# Patient Record
Sex: FEMALE | Race: WHITE | NOT HISPANIC OR LATINO | Employment: FULL TIME | URBAN - METROPOLITAN AREA
[De-identification: names, ages, dates, MRNs, and addresses within clinical notes are randomized per-mention and may not be internally consistent; named-entity substitution may affect disease eponyms.]

---

## 2020-01-28 RX ORDER — AZITHROMYCIN 250 MG/1
TABLET, FILM COATED ORAL
COMMUNITY
Start: 2017-10-04 | End: 2020-05-20 | Stop reason: HOSPADM

## 2020-01-28 RX ORDER — BUDESONIDE AND FORMOTEROL FUMARATE DIHYDRATE 80; 4.5 UG/1; UG/1
2 AEROSOL RESPIRATORY (INHALATION) 2 TIMES DAILY
COMMUNITY
Start: 2016-11-16 | End: 2020-09-09

## 2020-01-28 RX ORDER — TRAZODONE HYDROCHLORIDE 150 MG/1
TABLET ORAL
COMMUNITY
Start: 2017-08-03 | End: 2020-09-09

## 2020-01-28 RX ORDER — CETIRIZINE HYDROCHLORIDE 10 MG/1
10 TABLET ORAL 2 TIMES DAILY
COMMUNITY
End: 2021-05-27

## 2020-01-28 RX ORDER — SULFAMETHOXAZOLE AND TRIMETHOPRIM 800; 160 MG/1; MG/1
TABLET ORAL 2 TIMES DAILY
COMMUNITY
Start: 2020-01-27 | End: 2020-02-06

## 2020-01-28 RX ORDER — OMEPRAZOLE 20 MG/1
20 CAPSULE, DELAYED RELEASE ORAL EVERY MORNING
COMMUNITY

## 2020-01-28 RX ORDER — ALBUTEROL SULFATE 90 UG/1
1 AEROSOL, METERED RESPIRATORY (INHALATION) EVERY 6 HOURS PRN
COMMUNITY
Start: 2019-10-04 | End: 2020-10-03

## 2020-01-29 ENCOUNTER — OFFICE VISIT (OUTPATIENT)
Dept: SURGERY | Facility: CLINIC | Age: 55
End: 2020-01-29
Payer: COMMERCIAL

## 2020-01-29 VITALS
BODY MASS INDEX: 42.38 KG/M2 | HEART RATE: 70 BPM | RESPIRATION RATE: 18 BRPM | HEIGHT: 67 IN | WEIGHT: 270 LBS | SYSTOLIC BLOOD PRESSURE: 132 MMHG | DIASTOLIC BLOOD PRESSURE: 78 MMHG

## 2020-01-29 DIAGNOSIS — S31.109A OPEN WOUND OF ABDOMINAL WALL, INITIAL ENCOUNTER: Primary | ICD-10-CM

## 2020-01-29 PROCEDURE — 99204 OFFICE O/P NEW MOD 45 MIN: CPT | Performed by: SPECIALIST

## 2020-01-29 RX ORDER — BUDESONIDE AND FORMOTEROL FUMARATE DIHYDRATE 80; 4.5 UG/1; UG/1
AEROSOL RESPIRATORY (INHALATION)
COMMUNITY
End: 2020-02-18

## 2020-01-29 NOTE — H&P
Chief Complaint:  Multiple suture granulomas      History of Present Illness:   Patient is a 51-year-old white female patient of ours for many years  Most recently we performed a laparoscopic cholecystectomy with cholangiogram January 2016  Prior to that she underwent an open gastric bypass at [facility] in [place] in 2000  At that time she weighed close to 600 lb  She currently weighs about 270  She also went underwent a revision in 2006  A subsequent tummy tuck in 2007 with hernia repair  Recurrent hernia repaired with mesh  Laparoscopic assisted repair of the hernia lysis of adhesions 2008  In 2015 a removal of suture granulomas protruding through her incision  Subsequent crampy abdominal pain precipitating laparoscopic cholecystectomy cholangiogram 01/14/2016  She presents to the office today with a 6 week history of swelling and drainage from her abdominal wall  She has 4 areas in question  Three are subxiphoid  Two of these are protrusions that are erythematous and quite tender to palpation  One is an indentation  Another 1 in the left lower quadrant as an indentation  She was seen by her family physician placed on Bactrim  She was referred office for evaluation of this  Other than that she is relatively healthy  She has a history of asthma but this is been under control  Past Medical History:   Past Medical History:   Diagnosis Date    Anxiety     Arthritis     Asthma     Clotting disorder ([facility] [address] )     GERD (gastroesophageal reflux disease)     Hypertension          Past Surgical History:    Past Surgical History:   Procedure Laterality Date    CHOLECYSTECTOMY      COLON SURGERY      HERNIA REPAIR      SLEEVE GASTROPLASTY      [employer]    SMALL INTESTINE SURGERY           Allergies:     Allergies   Allergen Reactions    Morphine Itching    Penicillins Other (See Comments)     "PARALYZES HER"  Was paralized  Was paralized           Medications:    Current Outpatient Medications:     albuterol (PROVENTIL HFA,VENTOLIN HFA) 90 mcg/act inhaler, Inhale 1 puff every 6 (six) hours as needed, Disp: , Rfl:     budesonide-formoterol (SYMBICORT) 80-4 5 MCG/ACT inhaler, Inhale 2 puffs 2 (two) times a day, Disp: , Rfl:     cetirizine (ZyrTEC) 10 mg tablet, Take 10 mg by mouth 2 (two) times a day, Disp: , Rfl:     Omeprazole (PRILOSEC PO), omeprazole, Disp: , Rfl:     sulfamethoxazole-trimethoprim (BACTRIM DS) 800-160 mg per tablet, Take by mouth 2 (two) times a day, Disp: , Rfl:     azithromycin (ZITHROMAX) 250 mg tablet, 2 tabs po day 1 than 1 tab po days 2-5, Disp: , Rfl:     budesonide-formoterol (SYMBICORT) 80-4 5 MCG/ACT inhaler, Symbicort 80 mcg-4 5 mcg/actuation HFA aerosol inhaler  INHALE 2 PUFFS 2 (TWO) TIMES A DAY , Disp: , Rfl:     omeprazole (PRILOSEC) 20 mg delayed release capsule, Take 20 mg by mouth daily, Disp: , Rfl:     traZODone (DESYREL) 150 mg tablet, TAKE 2 TABLETS AT BEDTIME, Disp: , Rfl:       Social History:  Social History     Social History     Substance and Sexual Activity   Alcohol Use Yes    Binge frequency: Monthly     Social History     Substance and Sexual Activity   Drug Use Never     Social History     Tobacco Use   Smoking Status Light Tobacco Smoker   Smokeless Tobacco Never Used         Family History:    Family History   Problem Relation Age of Onset    Heart block Mother     Diabetes Mother     No Known Problems Father          Review of Systems:    As per the HPI  Pain and drainage at the sites in question on the abdominal wall  A feeling of fullness around the mid abdomen  No GI complaints    No weight loss weight gain fever chills night sweats chest pain nausea vomiting diarrhea constipation shortness of breath sore throat headaches double vision blurry vision dysuria hematuria diarrhea or melena    Vitals:  Vitals:    01/29/20 1050   BP: 132/78   Pulse: 70   Resp: 18       Physical Exam:  The patient is a middle-aged obese white female who is awake alert no distress  Vital signs as above  Skin she has diffuse generalized psoriatic rash with chronic flaky skin  Otherwise warm and dry  Head normocephalic and atraumatic  Eyes IVAN a m  intact  Ears nose within normal limits  Throat gag reflex intact  Neck no masses thyromegaly lymphadenopathy palpable  Back no CVA or spinal tenderness  Lungs clear to a and P no rales rhonchi or wheezes  Cor regular rate and rhythm no murmurs carotid bruits  Abdomen obese soft scars consistent with her previous surgeries  Two areas in the subxiphoid are protruding out 1 is erythematous and quite tender to palpation  The other is tender but not erythematous  A 3rd area is indented with a small opening  In the left lower quadrant of the abdomen is a similar indented area  She has some mild tenderness  Psoriatic rashes present  No obvious erythema  Extremities negative CC E  Neurologically A&O x3 cranial 2-12 intact  Lymphatics no lymphadenopathy palpable  Lab Results: I have personally reviewed pertinent reports  See below  Imaging: I have personally reviewed pertinent reports  EKG, Pathology, and Other Studies: I have personally reviewed pertinent reports  No visits with results within 1 Day(s) from this visit  Latest known visit with results is:   No results found for any previous visit  Impression:  Tender/draining areas abdominal wall  Suture granulomas  ?  Infected    Plan:  Wound exploration under local with IV sedation at the hospital   Vancomycin

## 2020-01-29 NOTE — H&P (VIEW-ONLY)
Chief Complaint:  Multiple suture granulomas      History of Present Illness:   Patient is a 51-year-old white female patient of ours for many years  Most recently we performed a laparoscopic cholecystectomy with cholangiogram January 2016  Prior to that she underwent an open gastric bypass at [facility] in [address] [place] [address] in 2000  At that time she weighed close to 600 lb  She currently weighs about 270  She also went underwent a revision in 2006  A subsequent tummy tuck in 2007 with hernia repair  Recurrent hernia repaired with mesh  Laparoscopic assisted repair of the hernia lysis of adhesions 2008  In 2015 a removal of suture granulomas protruding through her incision  Subsequent crampy abdominal pain precipitating laparoscopic cholecystectomy cholangiogram 01/14/2016  She presents to the office today with a 6 week history of swelling and drainage from her abdominal wall  She has 4 areas in question  Three are subxiphoid  Two of these are protrusions that are erythematous and quite tender to palpation  One is an indentation  Another 1 in the left lower quadrant as an indentation  She was seen by her family physician placed on Bactrim  She was referred office for evaluation of this  Other than that she is relatively healthy  She has a history of asthma but this is been under control  Past Medical History:   Past Medical History:   Diagnosis Date    Anxiety     Arthritis     Asthma     Clotting disorder ([address] )     GERD (gastroesophageal reflux disease)     Hypertension          Past Surgical History:    Past Surgical History:   Procedure Laterality Date    CHOLECYSTECTOMY      COLON SURGERY      HERNIA REPAIR      SLEEVE GASTROPLASTY      [employer]    SMALL INTESTINE SURGERY           Allergies:     Allergies   Allergen Reactions    Morphine Itching    Penicillins Other (See Comments)     "PARALYZES HER"  Was paralized  Was paralized           Medications:    Current Outpatient Medications:     albuterol (PROVENTIL HFA,VENTOLIN HFA) 90 mcg/act inhaler, Inhale 1 puff every 6 (six) hours as needed, Disp: , Rfl:     budesonide-formoterol (SYMBICORT) 80-4 5 MCG/ACT inhaler, Inhale 2 puffs 2 (two) times a day, Disp: , Rfl:     cetirizine (ZyrTEC) 10 mg tablet, Take 10 mg by mouth 2 (two) times a day, Disp: , Rfl:     Omeprazole (PRILOSEC PO), omeprazole, Disp: , Rfl:     sulfamethoxazole-trimethoprim (BACTRIM DS) 800-160 mg per tablet, Take by mouth 2 (two) times a day, Disp: , Rfl:     azithromycin (ZITHROMAX) 250 mg tablet, 2 tabs po day 1 than 1 tab po days 2-5, Disp: , Rfl:     budesonide-formoterol (SYMBICORT) 80-4 5 MCG/ACT inhaler, Symbicort 80 mcg-4 5 mcg/actuation HFA aerosol inhaler  INHALE 2 PUFFS 2 (TWO) TIMES A DAY , Disp: , Rfl:     omeprazole (PRILOSEC) 20 mg delayed release capsule, Take 20 mg by mouth daily, Disp: , Rfl:     traZODone (DESYREL) 150 mg tablet, TAKE 2 TABLETS AT BEDTIME, Disp: , Rfl:       Social History:  Social History     Social History     Substance and Sexual Activity   Alcohol Use Yes    Binge frequency: Monthly     Social History     Substance and Sexual Activity   Drug Use Never     Social History     Tobacco Use   Smoking Status Light Tobacco Smoker   Smokeless Tobacco Never Used         Family History:    Family History   Problem Relation Age of Onset    Heart block Mother     Diabetes Mother     No Known Problems Father          Review of Systems:    As per the HPI  Pain and drainage at the sites in question on the abdominal wall  A feeling of fullness around the mid abdomen  No GI complaints    No weight loss weight gain fever chills night sweats chest pain nausea vomiting diarrhea constipation shortness of breath sore throat headaches double vision blurry vision dysuria hematuria diarrhea or melena    Vitals:  Vitals:    01/29/20 1050   BP: 132/78   Pulse: 70   Resp: 18       Physical Exam:  The patient is a middle-aged obese white female who is awake alert no distress  Vital signs as above  Skin she has diffuse generalized psoriatic rash with chronic flaky skin  Otherwise warm and dry  Head normocephalic and atraumatic  Eyes IVAN a m  intact  Ears nose within normal limits  Throat gag reflex intact  Neck no masses thyromegaly lymphadenopathy palpable  Back no CVA or spinal tenderness  Lungs clear to a and P no rales rhonchi or wheezes  Cor regular rate and rhythm no murmurs carotid bruits  Abdomen obese soft scars consistent with her previous surgeries  Two areas in the subxiphoid are protruding out 1 is erythematous and quite tender to palpation  The other is tender but not erythematous  A 3rd area is indented with a small opening  In the left lower quadrant of the abdomen is a similar indented area  She has some mild tenderness  Psoriatic rashes present  No obvious erythema  Extremities negative CC E  Neurologically A&O x3 cranial 2-12 intact  Lymphatics no lymphadenopathy palpable  Lab Results: I have personally reviewed pertinent reports  See below  Imaging: I have personally reviewed pertinent reports  EKG, Pathology, and Other Studies: I have personally reviewed pertinent reports  No visits with results within 1 Day(s) from this visit  Latest known visit with results is:   No results found for any previous visit  Impression:  Tender/draining areas abdominal wall  Suture granulomas  ?  Infected    Plan:  Wound exploration under local with IV sedation at the hospital   Vancomycin

## 2020-01-30 ENCOUNTER — APPOINTMENT (OUTPATIENT)
Dept: LAB | Facility: CLINIC | Age: 55
End: 2020-01-30
Payer: COMMERCIAL

## 2020-01-30 ENCOUNTER — CLINICAL SUPPORT (OUTPATIENT)
Dept: URGENT CARE | Facility: CLINIC | Age: 55
End: 2020-01-30
Payer: COMMERCIAL

## 2020-01-30 ENCOUNTER — ANESTHESIA EVENT (OUTPATIENT)
Dept: PERIOP | Facility: HOSPITAL | Age: 55
End: 2020-01-30
Payer: COMMERCIAL

## 2020-01-30 DIAGNOSIS — S31.109A OPEN WOUND OF ABDOMINAL WALL, INITIAL ENCOUNTER: ICD-10-CM

## 2020-01-30 LAB
ALBUMIN SERPL BCP-MCNC: 3.4 G/DL (ref 3.5–5)
ALP SERPL-CCNC: 127 U/L (ref 46–116)
ALT SERPL W P-5'-P-CCNC: 30 U/L (ref 12–78)
ANION GAP SERPL CALCULATED.3IONS-SCNC: 5 MMOL/L (ref 4–13)
AST SERPL W P-5'-P-CCNC: 33 U/L (ref 5–45)
ATRIAL RATE: 59 BPM
BASOPHILS # BLD AUTO: 0.06 THOUSANDS/ΜL (ref 0–0.1)
BASOPHILS NFR BLD AUTO: 1 % (ref 0–1)
BILIRUB SERPL-MCNC: 0.46 MG/DL (ref 0.2–1)
BILIRUB UR QL STRIP: NEGATIVE
BUN SERPL-MCNC: 12 MG/DL (ref 5–25)
CALCIUM SERPL-MCNC: 8.8 MG/DL (ref 8.3–10.1)
CHLORIDE SERPL-SCNC: 107 MMOL/L (ref 100–108)
CLARITY UR: CLEAR
CO2 SERPL-SCNC: 27 MMOL/L (ref 21–32)
COLOR UR: YELLOW
CREAT SERPL-MCNC: 0.55 MG/DL (ref 0.6–1.3)
EOSINOPHIL # BLD AUTO: 0.25 THOUSAND/ΜL (ref 0–0.61)
EOSINOPHIL NFR BLD AUTO: 4 % (ref 0–6)
ERYTHROCYTE [DISTWIDTH] IN BLOOD BY AUTOMATED COUNT: 19.3 % (ref 11.6–15.1)
GFR SERPL CREATININE-BSD FRML MDRD: 107 ML/MIN/1.73SQ M
GLUCOSE SERPL-MCNC: 92 MG/DL (ref 65–140)
GLUCOSE UR STRIP-MCNC: NEGATIVE MG/DL
HCT VFR BLD AUTO: 35.5 % (ref 34.8–46.1)
HGB BLD-MCNC: 10.1 G/DL (ref 11.5–15.4)
HGB UR QL STRIP.AUTO: NEGATIVE
IMM GRANULOCYTES # BLD AUTO: 0.01 THOUSAND/UL (ref 0–0.2)
IMM GRANULOCYTES NFR BLD AUTO: 0 % (ref 0–2)
KETONES UR STRIP-MCNC: NEGATIVE MG/DL
LEUKOCYTE ESTERASE UR QL STRIP: NEGATIVE
LYMPHOCYTES # BLD AUTO: 1.91 THOUSANDS/ΜL (ref 0.6–4.47)
LYMPHOCYTES NFR BLD AUTO: 28 % (ref 14–44)
MCH RBC QN AUTO: 20.3 PG (ref 26.8–34.3)
MCHC RBC AUTO-ENTMCNC: 28.5 G/DL (ref 31.4–37.4)
MCV RBC AUTO: 71 FL (ref 82–98)
MONOCYTES # BLD AUTO: 0.55 THOUSAND/ΜL (ref 0.17–1.22)
MONOCYTES NFR BLD AUTO: 8 % (ref 4–12)
NEUTROPHILS # BLD AUTO: 4.02 THOUSANDS/ΜL (ref 1.85–7.62)
NEUTS SEG NFR BLD AUTO: 59 % (ref 43–75)
NITRITE UR QL STRIP: NEGATIVE
NRBC BLD AUTO-RTO: 0 /100 WBCS
PH UR STRIP.AUTO: 6.5 [PH]
PLATELET # BLD AUTO: 327 THOUSANDS/UL (ref 149–390)
PMV BLD AUTO: 10.9 FL (ref 8.9–12.7)
POTASSIUM SERPL-SCNC: 4.2 MMOL/L (ref 3.5–5.3)
PROT SERPL-MCNC: 7.8 G/DL (ref 6.4–8.2)
PROT UR STRIP-MCNC: NEGATIVE MG/DL
QRS AXIS: 191 DEGREES
QRSD INTERVAL: 84 MS
QT INTERVAL: 438 MS
QTC INTERVAL: 433 MS
RBC # BLD AUTO: 4.98 MILLION/UL (ref 3.81–5.12)
SODIUM SERPL-SCNC: 139 MMOL/L (ref 136–145)
SP GR UR STRIP.AUTO: 1.01 (ref 1–1.03)
T WAVE AXIS: 114 DEGREES
UROBILINOGEN UR QL STRIP.AUTO: 1 E.U./DL
VENTRICULAR RATE: 59 BPM
WBC # BLD AUTO: 6.8 THOUSAND/UL (ref 4.31–10.16)

## 2020-01-30 PROCEDURE — 93010 ELECTROCARDIOGRAM REPORT: CPT | Performed by: INTERNAL MEDICINE

## 2020-01-30 PROCEDURE — 85025 COMPLETE CBC W/AUTO DIFF WBC: CPT

## 2020-01-30 PROCEDURE — 81003 URINALYSIS AUTO W/O SCOPE: CPT | Performed by: SPECIALIST

## 2020-01-30 PROCEDURE — 80053 COMPREHEN METABOLIC PANEL: CPT

## 2020-01-30 PROCEDURE — 93005 ELECTROCARDIOGRAM TRACING: CPT

## 2020-01-30 PROCEDURE — 36415 COLL VENOUS BLD VENIPUNCTURE: CPT

## 2020-01-30 RX ORDER — PREDNISONE 1 MG/1
TABLET ORAL DAILY
COMMUNITY
End: 2020-05-20 | Stop reason: HOSPADM

## 2020-01-30 NOTE — PRE-PROCEDURE INSTRUCTIONS
Pre-Surgery Instructions:   Medication Instructions    albuterol (PROVENTIL HFA,VENTOLIN HFA) 90 mcg/act inhaler Instructed patient per Anesthesia Guidelines   budesonide-formoterol (SYMBICORT) 80-4 5 MCG/ACT inhaler Instructed patient per Anesthesia Guidelines   cetirizine (ZyrTEC) 10 mg tablet Instructed patient per Anesthesia Guidelines   omeprazole (PRILOSEC) 20 mg delayed release capsule Instructed patient per Anesthesia Guidelines   predniSONE 1 mg tablet Instructed patient per Anesthesia Guidelines   sulfamethoxazole-trimethoprim (BACTRIM DS) 800-160 mg per tablet Instructed patient per Anesthesia Guidelines  Pre-op Showering Instructions for Surgery Patients    Before your operation, you play an important role in decreasing your risk for infection by washing with special antiseptic soap  This is an effective way to reduce bacteria on the skin which may help to prevent infections at the surgical site  Please read the following directions in advance  1  In the week before your operation, purchase a 4 ounce bottle of antiseptic soap containing chlorhexidine gluconate (CHG)  4%  Some brand names include: Aplicare®, Endure, and Hibiclens®  The cost is usually less than $5 00   For your convenience, the [employer] carries the soap   It may also be available at your doctors office or pre-admission testing center, and at most retail pharmacies   If you are allergic or sensitive to soaps containing CHG, please let your doctor know so another antiseptic can be suggested   CHG antiseptic soap is for external use only  2   The day before your operation, follow these instructions carefully to get ready   Please clean linens (sheets) on your bed; you should sleep on clean sheets after your evening shower   Get clean towels and washcloth ready - you need enough for 2 showers   Set aside clean underwear, pajamas, and clothing to wear after the showers     Reminders:   DO NOT use any other soap or body rinse on your skin during or after the antiseptic showers   DO NOT use lotion, powder, deodorant, or perfume/aftershave of any kind on your skin after your antiseptic shower   DO NOT shave any body parts in the 24 hours/day before your operation   DO NOT get the antiseptic soap in your eyes, ears, nose, mouth, or vaginal area    3  You will need to shower the night before AND the morning of your surgery  Shower 1:   The first evening before the operation, take the first shower   First, shampoo your hair with regular shampoo and rinse it completely before you use the antiseptic soap  After washing and rinsing your hair, rinse your body   Next, use a clean washcloth to apply the antiseptic soap and wash your body from the neck down to your toes using ½ bottle of the antiseptic soap   You will use the other ½ bottle for the second shower   Clean the area where your incision will be; lather this area well for about 2 minutes   If you are having head or neck surgery, wash areas with the antiseptic soap   Rinse yourself completely with running water   Use a clean towel to dry off   Wear clean underwear and clothing/pajamas  Shower 2   The morning of your operation, take the second shower following the same steps as Shower 1 using the second ½ of the bottle of antiseptic soap   Use clean cloths and towels to wash and dry yourself   Wear clean underwear and clothing

## 2020-01-30 NOTE — PRE-PROCEDURE INSTRUCTIONS
Pre-Surgery Instructions:   Medication Instructions    albuterol (PROVENTIL HFA,VENTOLIN HFA) 90 mcg/act inhaler Instructed patient per Anesthesia Guidelines   budesonide-formoterol (SYMBICORT) 80-4 5 MCG/ACT inhaler Instructed patient per Anesthesia Guidelines   cetirizine (ZyrTEC) 10 mg tablet Instructed patient per Anesthesia Guidelines   omeprazole (PRILOSEC) 20 mg delayed release capsule Instructed patient per Anesthesia Guidelines   predniSONE 1 mg tablet Instructed patient per Anesthesia Guidelines   sulfamethoxazole-trimethoprim (BACTRIM DS) 800-160 mg per tablet Instructed patient per Anesthesia Guidelines  Pre-op Showering Instructions for Surgery Patients    Before your operation, you play an important role in decreasing your risk for infection by washing with special antiseptic soap  This is an effective way to reduce bacteria on the skin which may help to prevent infections at the surgical site  Please read the following directions in advance  1  In the week before your operation, purchase a 4 ounce bottle of antiseptic soap containing chlorhexidine gluconate (CHG)  4%  Some brand names include: Aplicare®, Endure, and Hibiclens®  The cost is usually less than $5 00   For your convenience, the [facility] carries the soap   It may also be available at your doctors office or pre-admission testing center, and at most retail pharmacies   If you are allergic or sensitive to soaps containing CHG, please let your doctor know so another antiseptic can be suggested   CHG antiseptic soap is for external use only  2   The day before your operation, follow these instructions carefully to get ready   Please clean linens (sheets) on your bed; you should sleep on clean sheets after your evening shower   Get clean towels and washcloth ready - you need enough for 2 showers   Set aside clean underwear, pajamas, and clothing to wear after the showers     Reminders:   DO NOT use any other soap or body rinse on your skin during or after the antiseptic showers   DO NOT use lotion, powder, deodorant, or perfume/aftershave of any kind on your skin after your antiseptic shower   DO NOT shave any body parts in the 24 hours/day before your operation   DO NOT get the antiseptic soap in your eyes, ears, nose, mouth, or vaginal area    3  You will need to shower the night before AND the morning of your surgery  Shower 1:   The first evening before the operation, take the first shower   First, shampoo your hair with regular shampoo and rinse it completely before you use the antiseptic soap  After washing and rinsing your hair, rinse your body   Next, use a clean washcloth to apply the antiseptic soap and wash your body from the neck down to your toes using ½ bottle of the antiseptic soap   You will use the other ½ bottle for the second shower   Clean the area where your incision will be; lather this area well for about 2 minutes   If you are having head or neck surgery, wash areas with the antiseptic soap   Rinse yourself completely with running water   Use a clean towel to dry off   Wear clean underwear and clothing/pajamas  Shower 2   The morning of your operation, take the second shower following the same steps as Shower 1 using the second ½ of the bottle of antiseptic soap   Use clean cloths and towels to wash and dry yourself   Wear clean underwear and clothing

## 2020-01-30 NOTE — ANESTHESIA PREPROCEDURE EVALUATION
Review of Systems/Medical History  Patient summary reviewed  Chart reviewed      Cardiovascular  EKG reviewed, Exercise tolerance (METS): <4,  DVT  PE (IVC in place),  Pulmonary  Smoker cigarette smoker  , Tobacco cessation counseling given Cumulative Pack Years: 25, Asthma , well controlled/ stable Last rescue: < 1 week ago Asthma type of rescue: chronic medication usage, No recent URI , Sleep apnea , Not oxygen dependent ,        GI/Hepatic    GERD , Bariatric surgery (600 lbs   has had a revision),        Negative  ROS        Endo/Other    Obesity  super morbid obesity   GYN       Hematology  Negative hematology ROS   Coagulation disorder (????) ,    Musculoskeletal    Arthritis     Neurology  Negative neurology ROS      Psychology   Anxiety,              Physical Exam    Airway    Mallampati score: II         Dental       Cardiovascular  Cardiovascular exam normal    Pulmonary  Pulmonary exam normal     Other Findings  Fixed upper and lower teeth and in good repair      Anesthesia Plan  ASA Score- 3     Anesthesia Type- IV sedation with anesthesia with ASA Monitors  Additional Monitors:   Airway Plan:         Plan Factors-Patient not instructed to abstain from smoking on day of procedure  Patient did not smoke on day of surgery  Induction- intravenous  Postoperative Plan- Plan for postoperative opioid use  Informed Consent- Anesthetic plan and risks discussed with patient  I personally reviewed this patient with the CRNA  Discussed and agreed on the Anesthesia Plan with the CRNA  Zaina Barclay

## 2020-01-31 ENCOUNTER — HOSPITAL ENCOUNTER (OUTPATIENT)
Facility: HOSPITAL | Age: 55
Setting detail: OUTPATIENT SURGERY
Discharge: HOME | End: 2020-01-31
Attending: SPECIALIST | Admitting: SPECIALIST
Payer: COMMERCIAL

## 2020-01-31 ENCOUNTER — ANESTHESIA (OUTPATIENT)
Dept: PERIOP | Facility: HOSPITAL | Age: 55
End: 2020-01-31
Payer: COMMERCIAL

## 2020-01-31 VITALS
RESPIRATION RATE: 16 BRPM | HEART RATE: 55 BPM | OXYGEN SATURATION: 99 % | TEMPERATURE: 97.9 F | SYSTOLIC BLOOD PRESSURE: 143 MMHG | DIASTOLIC BLOOD PRESSURE: 87 MMHG

## 2020-01-31 DIAGNOSIS — S31.109A OPEN WOUND OF ABDOMINAL WALL, INITIAL ENCOUNTER: Primary | ICD-10-CM

## 2020-01-31 LAB
ATRIAL RATE: 59 BPM
EXT PREGNANCY TEST URINE: NEGATIVE
EXT. CONTROL: NORMAL
QRS AXIS: 191 DEGREES
QRSD INTERVAL: 84 MS
QT INTERVAL: 438 MS
QTC INTERVAL: 433 MS
T WAVE AXIS: 114 DEGREES
VENTRICULAR RATE: 59 BPM

## 2020-01-31 PROCEDURE — 88305 TISSUE EXAM BY PATHOLOGIST: CPT | Performed by: PATHOLOGY

## 2020-01-31 PROCEDURE — 81025 URINE PREGNANCY TEST: CPT | Performed by: SPECIALIST

## 2020-01-31 PROCEDURE — 93010 ELECTROCARDIOGRAM REPORT: CPT | Performed by: INTERNAL MEDICINE

## 2020-01-31 PROCEDURE — 10121 INC&RMVL FB SUBQ TISS COMP: CPT | Performed by: SPECIALIST

## 2020-01-31 RX ORDER — OXYCODONE HYDROCHLORIDE AND ACETAMINOPHEN 5; 325 MG/1; MG/1
2 TABLET ORAL EVERY 4 HOURS PRN
Status: DISCONTINUED | OUTPATIENT
Start: 2020-01-31 | End: 2020-01-31 | Stop reason: HOSPADM

## 2020-01-31 RX ORDER — DIPHENHYDRAMINE HYDROCHLORIDE 50 MG/ML
12.5 INJECTION INTRAMUSCULAR; INTRAVENOUS ONCE AS NEEDED
Status: DISCONTINUED | OUTPATIENT
Start: 2020-01-31 | End: 2020-01-31 | Stop reason: HOSPADM

## 2020-01-31 RX ORDER — MEPERIDINE HYDROCHLORIDE 25 MG/ML
12.5 INJECTION INTRAMUSCULAR; INTRAVENOUS; SUBCUTANEOUS
Status: DISCONTINUED | OUTPATIENT
Start: 2020-01-31 | End: 2020-01-31 | Stop reason: HOSPADM

## 2020-01-31 RX ORDER — FENTANYL CITRATE/PF 50 MCG/ML
12.5 SYRINGE (ML) INJECTION
Status: DISCONTINUED | OUTPATIENT
Start: 2020-01-31 | End: 2020-01-31 | Stop reason: HOSPADM

## 2020-01-31 RX ORDER — VANCOMYCIN HYDROCHLORIDE 1 G/200ML
1000 INJECTION, SOLUTION INTRAVENOUS ONCE
Status: COMPLETED | OUTPATIENT
Start: 2020-01-31 | End: 2020-01-31

## 2020-01-31 RX ORDER — OXYCODONE HYDROCHLORIDE AND ACETAMINOPHEN 5; 325 MG/1; MG/1
1 TABLET ORAL EVERY 4 HOURS PRN
Status: DISCONTINUED | OUTPATIENT
Start: 2020-01-31 | End: 2020-01-31 | Stop reason: HOSPADM

## 2020-01-31 RX ORDER — FENTANYL CITRATE 50 UG/ML
INJECTION, SOLUTION INTRAMUSCULAR; INTRAVENOUS AS NEEDED
Status: DISCONTINUED | OUTPATIENT
Start: 2020-01-31 | End: 2020-01-31 | Stop reason: SURG

## 2020-01-31 RX ORDER — PROMETHAZINE HYDROCHLORIDE 25 MG/ML
12.5 INJECTION, SOLUTION INTRAMUSCULAR; INTRAVENOUS ONCE AS NEEDED
Status: DISCONTINUED | OUTPATIENT
Start: 2020-01-31 | End: 2020-01-31 | Stop reason: HOSPADM

## 2020-01-31 RX ORDER — VANCOMYCIN HYDROCHLORIDE 1 G/200ML
INJECTION, SOLUTION INTRAVENOUS AS NEEDED
Status: DISCONTINUED | OUTPATIENT
Start: 2020-01-31 | End: 2020-01-31 | Stop reason: SURG

## 2020-01-31 RX ORDER — PROPOFOL 10 MG/ML
INJECTION, EMULSION INTRAVENOUS CONTINUOUS PRN
Status: DISCONTINUED | OUTPATIENT
Start: 2020-01-31 | End: 2020-01-31 | Stop reason: SURG

## 2020-01-31 RX ORDER — MAGNESIUM HYDROXIDE 1200 MG/15ML
LIQUID ORAL AS NEEDED
Status: DISCONTINUED | OUTPATIENT
Start: 2020-01-31 | End: 2020-01-31 | Stop reason: HOSPADM

## 2020-01-31 RX ORDER — MIDAZOLAM HYDROCHLORIDE 2 MG/2ML
INJECTION, SOLUTION INTRAMUSCULAR; INTRAVENOUS AS NEEDED
Status: DISCONTINUED | OUTPATIENT
Start: 2020-01-31 | End: 2020-01-31 | Stop reason: SURG

## 2020-01-31 RX ORDER — BUPIVACAINE HYDROCHLORIDE 5 MG/ML
INJECTION, SOLUTION PERINEURAL AS NEEDED
Status: DISCONTINUED | OUTPATIENT
Start: 2020-01-31 | End: 2020-01-31 | Stop reason: HOSPADM

## 2020-01-31 RX ORDER — DEXAMETHASONE SODIUM PHOSPHATE 4 MG/ML
4 INJECTION, SOLUTION INTRA-ARTICULAR; INTRALESIONAL; INTRAMUSCULAR; INTRAVENOUS; SOFT TISSUE ONCE AS NEEDED
Status: DISCONTINUED | OUTPATIENT
Start: 2020-01-31 | End: 2020-01-31 | Stop reason: HOSPADM

## 2020-01-31 RX ORDER — SODIUM CHLORIDE, SODIUM LACTATE, POTASSIUM CHLORIDE, CALCIUM CHLORIDE 600; 310; 30; 20 MG/100ML; MG/100ML; MG/100ML; MG/100ML
75 INJECTION, SOLUTION INTRAVENOUS CONTINUOUS
Status: DISCONTINUED | OUTPATIENT
Start: 2020-01-31 | End: 2020-01-31 | Stop reason: HOSPADM

## 2020-01-31 RX ORDER — ONDANSETRON 2 MG/ML
4 INJECTION INTRAMUSCULAR; INTRAVENOUS EVERY 8 HOURS PRN
Status: DISCONTINUED | OUTPATIENT
Start: 2020-01-31 | End: 2020-01-31 | Stop reason: HOSPADM

## 2020-01-31 RX ORDER — KETOROLAC TROMETHAMINE 30 MG/ML
INJECTION, SOLUTION INTRAMUSCULAR; INTRAVENOUS AS NEEDED
Status: DISCONTINUED | OUTPATIENT
Start: 2020-01-31 | End: 2020-01-31 | Stop reason: SURG

## 2020-01-31 RX ORDER — DEXAMETHASONE SODIUM PHOSPHATE 10 MG/ML
INJECTION, SOLUTION INTRAMUSCULAR; INTRAVENOUS AS NEEDED
Status: DISCONTINUED | OUTPATIENT
Start: 2020-01-31 | End: 2020-01-31 | Stop reason: SURG

## 2020-01-31 RX ORDER — FENTANYL CITRATE/PF 50 MCG/ML
25 SYRINGE (ML) INJECTION
Status: COMPLETED | OUTPATIENT
Start: 2020-01-31 | End: 2020-01-31

## 2020-01-31 RX ORDER — OXYCODONE HYDROCHLORIDE AND ACETAMINOPHEN 5; 325 MG/1; MG/1
1 TABLET ORAL EVERY 4 HOURS PRN
Qty: 20 TABLET | Refills: 0 | Status: SHIPPED | OUTPATIENT
Start: 2020-01-31 | End: 2020-02-10

## 2020-01-31 RX ORDER — LIDOCAINE HYDROCHLORIDE 10 MG/ML
INJECTION, SOLUTION EPIDURAL; INFILTRATION; INTRACAUDAL; PERINEURAL AS NEEDED
Status: DISCONTINUED | OUTPATIENT
Start: 2020-01-31 | End: 2020-01-31 | Stop reason: HOSPADM

## 2020-01-31 RX ADMIN — FENTANYL CITRATE 10 MCG: 50 INJECTION INTRAMUSCULAR; INTRAVENOUS at 12:53

## 2020-01-31 RX ADMIN — FENTANYL CITRATE 12.5 MCG: 50 INJECTION, SOLUTION INTRAMUSCULAR; INTRAVENOUS at 14:02

## 2020-01-31 RX ADMIN — FENTANYL CITRATE 25 MCG: 50 INJECTION, SOLUTION INTRAMUSCULAR; INTRAVENOUS at 13:23

## 2020-01-31 RX ADMIN — FENTANYL CITRATE 20 MCG: 50 INJECTION INTRAMUSCULAR; INTRAVENOUS at 12:50

## 2020-01-31 RX ADMIN — FENTANYL CITRATE 10 MCG: 50 INJECTION INTRAMUSCULAR; INTRAVENOUS at 11:08

## 2020-01-31 RX ADMIN — FENTANYL CITRATE 12.5 MCG: 50 INJECTION, SOLUTION INTRAMUSCULAR; INTRAVENOUS at 13:47

## 2020-01-31 RX ADMIN — FENTANYL CITRATE 20 MCG: 50 INJECTION INTRAMUSCULAR; INTRAVENOUS at 12:47

## 2020-01-31 RX ADMIN — PROPOFOL 140 MCG/KG/MIN: 10 INJECTION, EMULSION INTRAVENOUS at 11:06

## 2020-01-31 RX ADMIN — FENTANYL CITRATE 25 MCG: 50 INJECTION, SOLUTION INTRAMUSCULAR; INTRAVENOUS at 13:07

## 2020-01-31 RX ADMIN — VANCOMYCIN HYDROCHLORIDE 1000 MG: 1 INJECTION, SOLUTION INTRAVENOUS at 10:36

## 2020-01-31 RX ADMIN — KETOROLAC TROMETHAMINE 30 MG: 30 INJECTION, SOLUTION INTRAMUSCULAR; INTRAVENOUS at 11:15

## 2020-01-31 RX ADMIN — VANCOMYCIN HYDROCHLORIDE 1000 MG: 1 INJECTION, SOLUTION INTRAVENOUS at 10:51

## 2020-01-31 RX ADMIN — FENTANYL CITRATE 12.5 MCG: 50 INJECTION, SOLUTION INTRAMUSCULAR; INTRAVENOUS at 13:36

## 2020-01-31 RX ADMIN — FENTANYL CITRATE 10 MCG: 50 INJECTION INTRAMUSCULAR; INTRAVENOUS at 12:12

## 2020-01-31 RX ADMIN — OXYCODONE HYDROCHLORIDE AND ACETAMINOPHEN 2 TABLET: 5; 325 TABLET ORAL at 14:39

## 2020-01-31 RX ADMIN — SODIUM CHLORIDE, SODIUM LACTATE, POTASSIUM CHLORIDE, AND CALCIUM CHLORIDE: .6; .31; .03; .02 INJECTION, SOLUTION INTRAVENOUS at 11:33

## 2020-01-31 RX ADMIN — MIDAZOLAM HYDROCHLORIDE 2 MG: 1 INJECTION, SOLUTION INTRAMUSCULAR; INTRAVENOUS at 11:06

## 2020-01-31 RX ADMIN — FENTANYL CITRATE 25 MCG: 50 INJECTION, SOLUTION INTRAMUSCULAR; INTRAVENOUS at 13:12

## 2020-01-31 RX ADMIN — FENTANYL CITRATE 10 MCG: 50 INJECTION INTRAMUSCULAR; INTRAVENOUS at 12:37

## 2020-01-31 RX ADMIN — FENTANYL CITRATE 10 MCG: 50 INJECTION INTRAMUSCULAR; INTRAVENOUS at 11:23

## 2020-01-31 RX ADMIN — SODIUM CHLORIDE, SODIUM LACTATE, POTASSIUM CHLORIDE, AND CALCIUM CHLORIDE: .6; .31; .03; .02 INJECTION, SOLUTION INTRAVENOUS at 10:31

## 2020-01-31 RX ADMIN — DEXAMETHASONE SODIUM PHOSPHATE 8 MG: 10 INJECTION, SOLUTION INTRAMUSCULAR; INTRAVENOUS at 11:17

## 2020-01-31 RX ADMIN — FENTANYL CITRATE 10 MCG: 50 INJECTION INTRAMUSCULAR; INTRAVENOUS at 12:43

## 2020-01-31 RX ADMIN — FENTANYL CITRATE 25 MCG: 50 INJECTION, SOLUTION INTRAMUSCULAR; INTRAVENOUS at 13:17

## 2020-01-31 NOTE — INTERVAL H&P NOTE
H&P reviewed  After examining the patient I find no changes in the patients condition since the H&P had been written      Vitals:    01/31/20 0942   BP: (!) 183/87   Pulse: 65   Resp: 18   Temp: 97 9 °F (36 6 °C)   SpO2: 98%

## 2020-01-31 NOTE — ANESTHESIA POSTPROCEDURE EVALUATION
Post-Op Assessment Note    CV Status:  Stable  Pain Score: 4    Pain management: adequate     Mental Status:  Alert and awake   Hydration Status:  Euvolemic   PONV Controlled:  Controlled   Airway Patency:  Patent   Post Op Vitals Reviewed: Yes      Staff: Anesthesiologist           BP     Temp      Pulse     Resp      SpO2

## 2020-02-03 NOTE — OP NOTE
OPERATIVE REPORT  PATIENT NAME: Mary Sinclair    :  1965  MRN: [id number]  Pt Location:  OR ROOM 07    SURGERY DATE: 2020    Surgeon(s) and Role:     * Iván Villarreal MD - Primary    Preop Diagnosis:  Open wound of abdominal wall, initial encounter [S31 109A]    Post-Op Diagnosis Codes:     * Open wound of abdominal wall, initial encounter [S31 109A]    Procedure(s) (LRB):  exploation of abdominal wound removal suture granulomas and foreign body (N/A)    Specimen(s):  ID Type Source Tests Collected by Time Destination   1 : abdominal suture granulomas and portion of mesh Tissue Suture TISSUE EXAM Iván Villarreal MD 2020 11:45 AM        Estimated Blood Loss:   Minimal    Drains:  * No LDAs found *    Anesthesia Type:   IV Sedation with Anesthesia    Operative Indications:  Open wound of abdominal wall, initial encounter [S31 109A]  Draining sinuses  Operative Findings:  4 suture granulomata of old Prolene suture and a small fragment of mesh  Complications:   None    Procedure and Technique:  Patient brought the operating room placed on the table supine position  After IV sedation the abdomen was prepped and draped in usual sterile fashion  The patient had 3 areas in the superior abdomen that had previously marked and 1 in the left lower quadrant of the abdomen that was also marked  The superior ones were infiltrated with some 1% lidocaine  The most superior wound is then explored but no etiology of the drainage was identified  The draining sinus just inferior to this about 2-3 cm is then explored after anesthetizing it  Once again no source was identified  The incisions were made somewhat larger and then the 1st and 2nd area were connected with the scalpel  This allowed much better exploration and immediately deep a Prolene suture was identified  Was amputated and removed from the field  An additional piece of Prolene was identified the small fragment of mesh    Just inferior and to the right of these incisions an additional site was present  This was anesthetized explored and ultimately incision was made larger  An old Prolene was identified removed from this area  Sponges were placed in both of these wounds  Attention was placed to the left lower quadrant  This was within a likewise fashion  It was explored and the Prolene was identified superior to the wound and removed  All wounds were then inspected for bleeding bleeding was controlled electrocautery  After adequate hemostasis obtained the wounds were closed in layers using 3 0 and 2 0 Vicryl subcutaneous tissue  Skin was closed with 3 0 nylon vertical mattress fashion  Dry sterile dressings applied  The estimated blood was minimal   Wounds were infiltrated with 0 5% Marcaine upon closure  Patient tolerated procedure well delivered to the recovery room stable condition     I was present for the entire procedure    Patient Disposition:  PACU     SIGNATURE: Kim Leger MD  DATE: February 3, 2020  TIME: 11:40 AM

## 2020-02-18 ENCOUNTER — OFFICE VISIT (OUTPATIENT)
Dept: SURGERY | Facility: CLINIC | Age: 55
End: 2020-02-18

## 2020-02-18 VITALS
HEART RATE: 72 BPM | BODY MASS INDEX: 43.79 KG/M2 | SYSTOLIC BLOOD PRESSURE: 176 MMHG | RESPIRATION RATE: 18 BRPM | DIASTOLIC BLOOD PRESSURE: 96 MMHG | HEIGHT: 67 IN | WEIGHT: 279 LBS

## 2020-02-18 DIAGNOSIS — T81.89XA SUTURE GRANULOMA, INITIAL ENCOUNTER: Primary | ICD-10-CM

## 2020-02-18 PROCEDURE — 99024 POSTOP FOLLOW-UP VISIT: CPT | Performed by: SPECIALIST

## 2020-02-18 NOTE — PROGRESS NOTES
Devi Sidhu presents today for postop visit status post excision of multiple suture granulomas from her abdominal wall  The upper incisions are quite tender  The lower incision is nontender  She says they are hard and the might be fluid in it  She denies any drainage from the wounds  She denies any pus  They are just sensitive  Physical exam:  Middle-aged obese white female awake alert no distress    Abdomen:  Left lower quadrant incision is healing well sutures removed  No problem  The 2 upper incisions have sutures in place  Some of the knots have sunken below the skin  These are removed as gently as possible but once again a few the stitches are painful  Wound clean with peroxide and a dry sterile dressing is applied to all  Impression:  Doing well  Reassured  Things are healing quite well  Plan:  Return 3 weeks for re-evaluation

## 2020-02-26 ENCOUNTER — OFFICE VISIT (OUTPATIENT)
Dept: SURGERY | Facility: CLINIC | Age: 55
End: 2020-02-26
Payer: COMMERCIAL

## 2020-02-26 VITALS
RESPIRATION RATE: 18 BRPM | DIASTOLIC BLOOD PRESSURE: 88 MMHG | SYSTOLIC BLOOD PRESSURE: 172 MMHG | BODY MASS INDEX: 43.79 KG/M2 | HEIGHT: 67 IN | WEIGHT: 279 LBS

## 2020-02-26 DIAGNOSIS — S21.209A WOUND OF BACK, UNSPECIFIED LATERALITY, INITIAL ENCOUNTER: Primary | ICD-10-CM

## 2020-02-26 PROCEDURE — 99214 OFFICE O/P EST MOD 30 MIN: CPT | Performed by: SPECIALIST

## 2020-02-26 NOTE — H&P
Chief Complaint:  Draining wound/sinus lower back      History of Present Illness:   Patient is a 22-year-old white female who was recently operated on by us for multiple abdominal wall suture granulomas  Briefly her past medical history included an open Vicenta-en-Y gastric bypass at [facility] in [place] in 2006  At that time she weighed about 650 pounds  She currently weighs about 250 pounds  She subsequently underwent tummy tuck, incisional hernia repair, lysis of adhesions for small-bowel obstruction, laparoscopic cholecystectomy and her most recent surgery  She presents to the office today with a greater than 1 year history of intermittent drainage from the lower back  She says she had chronic intermittent drainage for a full year that was treated locally and conservatively  It finally stopped draining after 4 year but recently started draining again  It is quite tender sensitive  She was sanguinous fluid is what drains out with no obvious pus or blood  It is quite tender and sensitive  No foul odor of the drainage  She denies any surgery in that area  She is here today for follow-up visit but also in regards to this      Past Medical History:   Past Medical History:   Diagnosis Date    Anxiety     Arthritis     Asthma 01/29/2020    exacerbation and is on  steroids    Chronic pain disorder     Clotting disorder (HCC)     GERD (gastroesophageal reflux disease)     Psoriatic arthritis ([address] )     Pulmonary emboli ([facility] [address] ) 1995    post humeral frac         Past Surgical History:    Past Surgical History:   Procedure Laterality Date    CHOLECYSTECTOMY      COLON SURGERY      COLONOSCOPY      FRACTURE SURGERY  1995    humeral frac repair    GASTRIC BYPASS OPEN  2006    HERNIA REPAIR      X3    IVC FILTER INSERTION  1995    radha filter    PANNICULECTOMY      GA EXPLORE WOUND,ABDOMEN/FLANK/BACK N/A 1/31/2020    Procedure: exploation of abdominal wound removal suture granulomas and foreign body;  Surgeon: Javier Hill MD;  Location: [address] [place] [address] OR;  Service: General    SMALL INTESTINE SURGERY           Allergies: Allergies   Allergen Reactions    Morphine Itching    Penicillins Other (See Comments)     "PARALYZES HER"  Was paralized  Was paralized           Medications:    Current Outpatient Medications:     albuterol (PROVENTIL HFA,VENTOLIN HFA) 90 mcg/act inhaler, Inhale 1 puff every 6 (six) hours as needed, Disp: , Rfl:     budesonide-formoterol (SYMBICORT) 80-4 5 MCG/ACT inhaler, Inhale 2 puffs 2 (two) times a day, Disp: , Rfl:     omeprazole (PRILOSEC) 20 mg delayed release capsule, Take 20 mg by mouth daily, Disp: , Rfl:     azithromycin (ZITHROMAX) 250 mg tablet, 2 tabs po day 1 than 1 tab po days 2-5, Disp: , Rfl:     cetirizine (ZyrTEC) 10 mg tablet, Take 10 mg by mouth 2 (two) times a day, Disp: , Rfl:     predniSONE 1 mg tablet, Take by mouth daily, Disp: , Rfl:     traZODone (DESYREL) 150 mg tablet, TAKE 2 TABLETS AT BEDTIME, Disp: , Rfl:       Social History:  Social History     Social History     Substance and Sexual Activity   Alcohol Use Yes    Binge frequency: Monthly     Social History     Substance and Sexual Activity   Drug Use Never     Social History     Tobacco Use   Smoking Status Heavy Tobacco Smoker    Packs/day: 0 50   Smokeless Tobacco Never Used   Tobacco Comment    1/2 ppd or less         Family History:    Family History   Problem Relation Age of Onset    Heart block Mother     Diabetes Mother     No Known Problems Father          Review of Systems:    As per the HPI pain and drainage in the lower back to the right of the midline  She has some mild residual discomfort and 1 of her recent scars        No weight loss weight gain fever chills night sweats chest pain nausea vomiting diarrhea constipation shortness of breath sore throat headaches double vision blurry vision dysuria hematuria diarrhea melena    Vitals:  Vitals:    02/26/20 1344   BP: Imelda Varghese 172/88   Resp: 18       Physical Exam:  Patient is a middle-aged obese white female who is awake alert no distress  Vital signs as above  Skin she has a diffuse generalized psoriatic rash chronic flaky skin  No drainage Head normocephalic and atraumatic  Eyes IVAN a m  intact  Ears nose within normal limits  Throat gag reflex intact  Neck no masses thyromegaly lymphadenopathy palpable  Back to the right of the midline in the lumbar area is a depressed area with a punctate opening draining serosanguineous/seropurulent material   It is not foul smelling  Lungs clear to a and P  Abdomen obese scars consistent with multiple previous surgeries  Fairly fresh but healing well incisions  Psoriatic rash present  Extremities negative CC E  Neurologically A&O x3 cranial nerves 2-12 intact  Lymphatics No lymphadenopathy palpable      Lab Results: I have personally reviewed pertinent reports  See below  Imaging: I have personally reviewed pertinent reports  EKG, Pathology, and Other Studies: I have personally reviewed pertinent reports  No visits with results within 1 Day(s) from this visit     Latest known visit with results is:   Admission on 01/31/2020, Discharged on 01/31/2020   Component Date Value    EXT Preg Test, Ur 01/31/2020 Negative     Control 01/31/2020 Valid     Case Report 01/31/2020                      Value:Surgical Pathology Report                         Case: [id number]                                   Authorizing Provider:  Jordy Lopez MD           Collected:           01/31/2020 1145              Ordering Location:     [facility] Received:            01/31/2020 [address] Operating Room                                                         Pathologist:           Michelle Diallo MD                                                          Specimen:    Suture, abdominal suture granulomas and portion of mesh  Final Diagnosis 01/31/2020                      Value: This result contains rich text formatting which cannot be displayed here   Additional Information 01/31/2020                      Value: This result contains rich text formatting which cannot be displayed here  Álvaro Cramer Gross Description 01/31/2020                      Value: This result contains rich text formatting which cannot be displayed here  Impression:  Draining sinus back  Etiology? Plan:  Explore the wound under local with IV sedation to diagnose the etiology and treat the problem

## 2020-02-26 NOTE — H&P (VIEW-ONLY)
Chief Complaint:  Draining wound/sinus lower back      History of Present Illness:   Patient is a 25-year-old white female who was recently operated on by us for multiple abdominal wall suture granulomas  Briefly her past medical history included an open Vicenta-en-Y gastric bypass at [facility] in [place] in 2006  At that time she weighed about 650 pounds  She currently weighs about 250 pounds  She subsequently underwent tummy tuck, incisional hernia repair, lysis of adhesions for small-bowel obstruction, laparoscopic cholecystectomy and her most recent surgery  She presents to the office today with a greater than 1 year history of intermittent drainage from the lower back  She says she had chronic intermittent drainage for a full year that was treated locally and conservatively  It finally stopped draining after 4 year but recently started draining again  It is quite tender sensitive  She was sanguinous fluid is what drains out with no obvious pus or blood  It is quite tender and sensitive  No foul odor of the drainage  She denies any surgery in that area  She is here today for follow-up visit but also in regards to this      Past Medical History:   Past Medical History:   Diagnosis Date    Anxiety     Arthritis     Asthma 01/29/2020    exacerbation and is on  steroids    Chronic pain disorder     Clotting disorder (HCC)     GERD (gastroesophageal reflux disease)     Psoriatic arthritis ([address] )     Pulmonary emboli ([facility] [address] ) 1995    post humeral frac         Past Surgical History:    Past Surgical History:   Procedure Laterality Date    CHOLECYSTECTOMY      COLON SURGERY      COLONOSCOPY      FRACTURE SURGERY  1995    humeral frac repair    GASTRIC BYPASS OPEN  2006    HERNIA REPAIR      X3    IVC FILTER INSERTION  1995    radha filter    PANNICULECTOMY      NH EXPLORE WOUND,ABDOMEN/FLANK/BACK N/A 1/31/2020    Procedure: exploation of abdominal wound removal suture granulomas and foreign body;  Surgeon: Gerhardt Plate, MD;  Location: [address] [place] [address] OR;  Service: General    SMALL INTESTINE SURGERY           Allergies: Allergies   Allergen Reactions    Morphine Itching    Penicillins Other (See Comments)     "PARALYZES HER"  Was paralized  Was paralized           Medications:    Current Outpatient Medications:     albuterol (PROVENTIL HFA,VENTOLIN HFA) 90 mcg/act inhaler, Inhale 1 puff every 6 (six) hours as needed, Disp: , Rfl:     budesonide-formoterol (SYMBICORT) 80-4 5 MCG/ACT inhaler, Inhale 2 puffs 2 (two) times a day, Disp: , Rfl:     omeprazole (PRILOSEC) 20 mg delayed release capsule, Take 20 mg by mouth daily, Disp: , Rfl:     azithromycin (ZITHROMAX) 250 mg tablet, 2 tabs po day 1 than 1 tab po days 2-5, Disp: , Rfl:     cetirizine (ZyrTEC) 10 mg tablet, Take 10 mg by mouth 2 (two) times a day, Disp: , Rfl:     predniSONE 1 mg tablet, Take by mouth daily, Disp: , Rfl:     traZODone (DESYREL) 150 mg tablet, TAKE 2 TABLETS AT BEDTIME, Disp: , Rfl:       Social History:  Social History     Social History     Substance and Sexual Activity   Alcohol Use Yes    Binge frequency: Monthly     Social History     Substance and Sexual Activity   Drug Use Never     Social History     Tobacco Use   Smoking Status Heavy Tobacco Smoker    Packs/day: 0 50   Smokeless Tobacco Never Used   Tobacco Comment    1/2 ppd or less         Family History:    Family History   Problem Relation Age of Onset    Heart block Mother     Diabetes Mother     No Known Problems Father          Review of Systems:    As per the HPI pain and drainage in the lower back to the right of the midline  She has some mild residual discomfort and 1 of her recent scars        No weight loss weight gain fever chills night sweats chest pain nausea vomiting diarrhea constipation shortness of breath sore throat headaches double vision blurry vision dysuria hematuria diarrhea melena    Vitals:  Vitals:    02/26/20 1344   BP: Chemo Varghese 172/88   Resp: 18       Physical Exam:  Patient is a middle-aged obese white female who is awake alert no distress  Vital signs as above  Skin she has a diffuse generalized psoriatic rash chronic flaky skin  No drainage Head normocephalic and atraumatic  Eyes IVAN a m  intact  Ears nose within normal limits  Throat gag reflex intact  Neck no masses thyromegaly lymphadenopathy palpable  Back to the right of the midline in the lumbar area is a depressed area with a punctate opening draining serosanguineous/seropurulent material   It is not foul smelling  Lungs clear to a and P  Abdomen obese scars consistent with multiple previous surgeries  Fairly fresh but healing well incisions  Psoriatic rash present  Extremities negative CC E  Neurologically A&O x3 cranial nerves 2-12 intact  Lymphatics No lymphadenopathy palpable      Lab Results: I have personally reviewed pertinent reports  See below  Imaging: I have personally reviewed pertinent reports  EKG, Pathology, and Other Studies: I have personally reviewed pertinent reports  No visits with results within 1 Day(s) from this visit     Latest known visit with results is:   Admission on 01/31/2020, Discharged on 01/31/2020   Component Date Value    EXT Preg Test, Ur 01/31/2020 Negative     Control 01/31/2020 Valid     Case Report 01/31/2020                      Value:Surgical Pathology Report                         Case: [id number]                                   Authorizing Provider:  Chanel Lobato MD           Collected:           01/31/2020 1145              Ordering Location[facility]     [facility] Received:            01/31/2020 [address] Operating Room                                                         Pathologist:           Adrianna Mendez MD                                                          Specimen:    Suture, abdominal suture granulomas and portion of mesh  Final Diagnosis 01/31/2020                      Value: This result contains rich text formatting which cannot be displayed here   Additional Information 01/31/2020                      Value: This result contains rich text formatting which cannot be displayed here  Marcial Gil Gross Description 01/31/2020                      Value: This result contains rich text formatting which cannot be displayed here  Impression:  Draining sinus back  Etiology? Plan:  Explore the wound under local with IV sedation to diagnose the etiology and treat the problem

## 2020-02-27 NOTE — PRE-PROCEDURE INSTRUCTIONS
Pre-Surgery Instructions:   Medication Instructions    albuterol (PROVENTIL HFA,VENTOLIN HFA) 90 mcg/act inhaler Instructed patient per Anesthesia Guidelines   budesonide-formoterol (SYMBICORT) 80-4 5 MCG/ACT inhaler Instructed patient per Anesthesia Guidelines   cetirizine (ZyrTEC) 10 mg tablet Instructed patient per Anesthesia Guidelines   omeprazole (PRILOSEC) 20 mg delayed release capsule Instructed patient per Anesthesia Guidelines  Pre-op Showering Instructions for Surgery Patients    Before your operation, you play an important role in decreasing your risk for infection by washing with special antiseptic soap  This is an effective way to reduce bacteria on the skin which may help to prevent infections at the surgical site  Please read the following directions in advance  1  In the week before your operation, purchase a 4 ounce bottle of antiseptic soap containing chlorhexidine gluconate (CHG)  4%  Some brand names include: Aplicare®, Endure, and Hibiclens®  The cost is usually less than $5 00   For your convenience, the [employer] carries the soap   It may also be available at your doctors office or pre-admission testing center, and at most retail pharmacies   If you are allergic or sensitive to soaps containing CHG, please let your doctor know so another antiseptic can be suggested   CHG antiseptic soap is for external use only  2   The day before your operation, follow these instructions carefully to get ready   Please clean linens (sheets) on your bed; you should sleep on clean sheets after your evening shower   Get clean towels and washcloth ready - you need enough for 2 showers   Set aside clean underwear, pajamas, and clothing to wear after the showers     Reminders:   DO NOT use any other soap or body rinse on your skin during or after the antiseptic showers   DO NOT use lotion, powder, deodorant, or perfume/aftershave of any kind on your skin after your antiseptic shower   DO NOT shave any body parts in the 24 hours/day before your operation   DO NOT get the antiseptic soap in your eyes, ears, nose, mouth, or vaginal area    3  You will need to shower the night before AND the morning of your surgery  Shower 1:   The first evening before the operation, take the first shower   First, shampoo your hair with regular shampoo and rinse it completely before you use the antiseptic soap  After washing and rinsing your hair, rinse your body   Next, use a clean washcloth to apply the antiseptic soap and wash your body from the neck down to your toes using ½ bottle of the antiseptic soap   You will use the other ½ bottle for the second shower   Clean the area where your incision will be; lather this area well for about 2 minutes   If you are having head or neck surgery, wash areas with the antiseptic soap   Rinse yourself completely with running water   Use a clean towel to dry off   Wear clean underwear and clothing/pajamas  Shower 2   The morning of your operation, take the second shower following the same steps as Shower 1 using the second ½ of the bottle of antiseptic soap   Use clean cloths and towels to wash and dry yourself   Wear clean underwear and clothing

## 2020-02-28 ENCOUNTER — ANESTHESIA EVENT (OUTPATIENT)
Dept: PERIOP | Facility: HOSPITAL | Age: 55
End: 2020-02-28
Payer: COMMERCIAL

## 2020-02-28 NOTE — ANESTHESIA PREPROCEDURE EVALUATION
Review of Systems/Medical History  Patient summary reviewed  Chart reviewed  No history of anesthetic complications     Cardiovascular  EKG reviewed, Exercise tolerance (METS): >4,    PE (IVC filter in place),  Pulmonary  Smoker cigarette smoker  , Tobacco cessation counseling given Cumulative Pack Years: 30, Asthma , well controlled/ stable Last rescue: < 1 month ago Asthma type of rescue: chronic medication usage, No recent URI , No sleep apnea , Not oxygen dependent ,        GI/Hepatic    GERD well controlled, Bariatric surgery,             Endo/Other  Negative endo/other ROS   Obesity  super morbid obesity   GYN  Not currently pregnant ,     Comment: postmenopausal     Hematology  Negative hematology ROS      Musculoskeletal    Arthritis     Neurology  Negative neurology ROS      Psychology   Anxiety,   Chronic pain,            Physical Exam    Airway    Mallampati score: II  TM Distance: >3 FB  Neck ROM: full     Dental       Cardiovascular  Cardiovascular exam normal    Pulmonary  Pulmonary exam normal     Other Findings        Anesthesia Plan  ASA Score- 3     Anesthesia Type- IV sedation with anesthesia with ASA Monitors  Additional Monitors:   Airway Plan:     Comment: Old chart reviewed  Pt known to me  No interval change except CC  Plan Factors-Patient not instructed to abstain from smoking on day of procedure  Patient did not smoke on day of surgery  Induction- intravenous  Postoperative Plan- Plan for postoperative opioid use  Informed Consent- Anesthetic plan and risks discussed with patient  I personally reviewed this patient with the CRNA  Discussed and agreed on the Anesthesia Plan with the CRNA  Philipp Gamez

## 2020-03-02 ENCOUNTER — ANESTHESIA (OUTPATIENT)
Dept: PERIOP | Facility: HOSPITAL | Age: 55
End: 2020-03-02
Payer: COMMERCIAL

## 2020-03-02 ENCOUNTER — HOSPITAL ENCOUNTER (OUTPATIENT)
Facility: HOSPITAL | Age: 55
Setting detail: OUTPATIENT SURGERY
Discharge: HOME | End: 2020-03-02
Attending: SPECIALIST | Admitting: SPECIALIST
Payer: COMMERCIAL

## 2020-03-02 VITALS
WEIGHT: 279 LBS | HEIGHT: 67 IN | BODY MASS INDEX: 43.79 KG/M2 | DIASTOLIC BLOOD PRESSURE: 74 MMHG | SYSTOLIC BLOOD PRESSURE: 166 MMHG | HEART RATE: 68 BPM | OXYGEN SATURATION: 99 % | RESPIRATION RATE: 16 BRPM | TEMPERATURE: 97.8 F

## 2020-03-02 DIAGNOSIS — S21.209A WOUND OF BACK, UNSPECIFIED LATERALITY, INITIAL ENCOUNTER: ICD-10-CM

## 2020-03-02 DIAGNOSIS — S21.201A WOUND OF RIGHT SIDE OF BACK, INITIAL ENCOUNTER: Primary | ICD-10-CM

## 2020-03-02 LAB
EXT PREGNANCY TEST URINE: NEGATIVE
EXT. CONTROL: NORMAL

## 2020-03-02 PROCEDURE — 20102 EXPL PENTRG WND ABD/FLNK/BK: CPT | Performed by: SPECIALIST

## 2020-03-02 PROCEDURE — 88304 TISSUE EXAM BY PATHOLOGIST: CPT | Performed by: PATHOLOGY

## 2020-03-02 PROCEDURE — 81025 URINE PREGNANCY TEST: CPT | Performed by: SPECIALIST

## 2020-03-02 RX ORDER — ONDANSETRON 2 MG/ML
4 INJECTION INTRAMUSCULAR; INTRAVENOUS EVERY 8 HOURS PRN
Status: DISCONTINUED | OUTPATIENT
Start: 2020-03-02 | End: 2020-03-02 | Stop reason: HOSPADM

## 2020-03-02 RX ORDER — FENTANYL CITRATE/PF 50 MCG/ML
12.5 SYRINGE (ML) INJECTION
Status: DISCONTINUED | OUTPATIENT
Start: 2020-03-02 | End: 2020-03-02 | Stop reason: HOSPADM

## 2020-03-02 RX ORDER — DEXAMETHASONE SODIUM PHOSPHATE 4 MG/ML
4 INJECTION, SOLUTION INTRA-ARTICULAR; INTRALESIONAL; INTRAMUSCULAR; INTRAVENOUS; SOFT TISSUE ONCE AS NEEDED
Status: COMPLETED | OUTPATIENT
Start: 2020-03-02 | End: 2020-03-02

## 2020-03-02 RX ORDER — LIDOCAINE HYDROCHLORIDE 10 MG/ML
INJECTION, SOLUTION EPIDURAL; INFILTRATION; INTRACAUDAL; PERINEURAL AS NEEDED
Status: DISCONTINUED | OUTPATIENT
Start: 2020-03-02 | End: 2020-03-02 | Stop reason: HOSPADM

## 2020-03-02 RX ORDER — KETOROLAC TROMETHAMINE 30 MG/ML
30 INJECTION, SOLUTION INTRAMUSCULAR; INTRAVENOUS ONCE
Status: COMPLETED | OUTPATIENT
Start: 2020-03-02 | End: 2020-03-02

## 2020-03-02 RX ORDER — PROMETHAZINE HYDROCHLORIDE 25 MG/ML
12.5 INJECTION, SOLUTION INTRAMUSCULAR; INTRAVENOUS ONCE AS NEEDED
Status: DISCONTINUED | OUTPATIENT
Start: 2020-03-02 | End: 2020-03-02 | Stop reason: HOSPADM

## 2020-03-02 RX ORDER — FENTANYL CITRATE 50 UG/ML
INJECTION, SOLUTION INTRAMUSCULAR; INTRAVENOUS AS NEEDED
Status: DISCONTINUED | OUTPATIENT
Start: 2020-03-02 | End: 2020-03-02 | Stop reason: SURG

## 2020-03-02 RX ORDER — LIDOCAINE HYDROCHLORIDE 10 MG/ML
INJECTION, SOLUTION EPIDURAL; INFILTRATION; INTRACAUDAL; PERINEURAL AS NEEDED
Status: DISCONTINUED | OUTPATIENT
Start: 2020-03-02 | End: 2020-03-02 | Stop reason: SURG

## 2020-03-02 RX ORDER — HYDRALAZINE HYDROCHLORIDE 20 MG/ML
10 INJECTION INTRAMUSCULAR; INTRAVENOUS ONCE
Status: COMPLETED | OUTPATIENT
Start: 2020-03-02 | End: 2020-03-02

## 2020-03-02 RX ORDER — FENTANYL CITRATE/PF 50 MCG/ML
25 SYRINGE (ML) INJECTION
Status: COMPLETED | OUTPATIENT
Start: 2020-03-02 | End: 2020-03-02

## 2020-03-02 RX ORDER — BUPIVACAINE HYDROCHLORIDE 5 MG/ML
INJECTION, SOLUTION EPIDURAL; INTRACAUDAL AS NEEDED
Status: DISCONTINUED | OUTPATIENT
Start: 2020-03-02 | End: 2020-03-02 | Stop reason: HOSPADM

## 2020-03-02 RX ORDER — OXYCODONE HYDROCHLORIDE AND ACETAMINOPHEN 5; 325 MG/1; MG/1
1 TABLET ORAL EVERY 4 HOURS PRN
Qty: 20 TABLET | Refills: 0 | Status: SHIPPED | OUTPATIENT
Start: 2020-03-02 | End: 2020-03-12

## 2020-03-02 RX ORDER — OXYCODONE HYDROCHLORIDE AND ACETAMINOPHEN 5; 325 MG/1; MG/1
2 TABLET ORAL EVERY 4 HOURS PRN
Status: DISCONTINUED | OUTPATIENT
Start: 2020-03-02 | End: 2020-03-02 | Stop reason: HOSPADM

## 2020-03-02 RX ORDER — MEPERIDINE HYDROCHLORIDE 25 MG/ML
12.5 INJECTION INTRAMUSCULAR; INTRAVENOUS; SUBCUTANEOUS
Status: DISCONTINUED | OUTPATIENT
Start: 2020-03-02 | End: 2020-03-02 | Stop reason: HOSPADM

## 2020-03-02 RX ORDER — DIPHENHYDRAMINE HYDROCHLORIDE 50 MG/ML
12.5 INJECTION INTRAMUSCULAR; INTRAVENOUS ONCE AS NEEDED
Status: DISCONTINUED | OUTPATIENT
Start: 2020-03-02 | End: 2020-03-02 | Stop reason: HOSPADM

## 2020-03-02 RX ORDER — SODIUM CHLORIDE, SODIUM LACTATE, POTASSIUM CHLORIDE, CALCIUM CHLORIDE 600; 310; 30; 20 MG/100ML; MG/100ML; MG/100ML; MG/100ML
75 INJECTION, SOLUTION INTRAVENOUS CONTINUOUS
Status: DISCONTINUED | OUTPATIENT
Start: 2020-03-02 | End: 2020-03-02 | Stop reason: HOSPADM

## 2020-03-02 RX ORDER — MAGNESIUM HYDROXIDE 1200 MG/15ML
LIQUID ORAL AS NEEDED
Status: DISCONTINUED | OUTPATIENT
Start: 2020-03-02 | End: 2020-03-02 | Stop reason: HOSPADM

## 2020-03-02 RX ORDER — PROPOFOL 10 MG/ML
INJECTION, EMULSION INTRAVENOUS AS NEEDED
Status: DISCONTINUED | OUTPATIENT
Start: 2020-03-02 | End: 2020-03-02 | Stop reason: SURG

## 2020-03-02 RX ORDER — PROPOFOL 10 MG/ML
INJECTION, EMULSION INTRAVENOUS CONTINUOUS PRN
Status: DISCONTINUED | OUTPATIENT
Start: 2020-03-02 | End: 2020-03-02 | Stop reason: SURG

## 2020-03-02 RX ORDER — OXYCODONE HYDROCHLORIDE AND ACETAMINOPHEN 5; 325 MG/1; MG/1
1 TABLET ORAL EVERY 4 HOURS PRN
Status: DISCONTINUED | OUTPATIENT
Start: 2020-03-02 | End: 2020-03-02 | Stop reason: HOSPADM

## 2020-03-02 RX ORDER — MIDAZOLAM HYDROCHLORIDE 2 MG/2ML
INJECTION, SOLUTION INTRAMUSCULAR; INTRAVENOUS AS NEEDED
Status: DISCONTINUED | OUTPATIENT
Start: 2020-03-02 | End: 2020-03-02 | Stop reason: SURG

## 2020-03-02 RX ADMIN — FENTANYL CITRATE 12.5 MCG: 50 INJECTION, SOLUTION INTRAMUSCULAR; INTRAVENOUS at 12:30

## 2020-03-02 RX ADMIN — HYDRALAZINE HYDROCHLORIDE 10 MG: 20 INJECTION INTRAMUSCULAR; INTRAVENOUS at 12:31

## 2020-03-02 RX ADMIN — MIDAZOLAM HYDROCHLORIDE 2 MG: 1 INJECTION, SOLUTION INTRAMUSCULAR; INTRAVENOUS at 10:52

## 2020-03-02 RX ADMIN — FENTANYL CITRATE 12.5 MCG: 50 INJECTION, SOLUTION INTRAMUSCULAR; INTRAVENOUS at 12:50

## 2020-03-02 RX ADMIN — DEXAMETHASONE SODIUM PHOSPHATE 4 MG: 4 INJECTION, SOLUTION INTRAMUSCULAR; INTRAVENOUS at 12:58

## 2020-03-02 RX ADMIN — SODIUM CHLORIDE, SODIUM LACTATE, POTASSIUM CHLORIDE, AND CALCIUM CHLORIDE: .6; .31; .03; .02 INJECTION, SOLUTION INTRAVENOUS at 10:50

## 2020-03-02 RX ADMIN — FENTANYL CITRATE 12.5 MCG: 50 INJECTION, SOLUTION INTRAMUSCULAR; INTRAVENOUS at 13:00

## 2020-03-02 RX ADMIN — FENTANYL CITRATE 25 MCG: 50 INJECTION, SOLUTION INTRAMUSCULAR; INTRAVENOUS at 12:20

## 2020-03-02 RX ADMIN — VANCOMYCIN 2000 MG: 1 INJECTION, SOLUTION INTRAVENOUS at 10:51

## 2020-03-02 RX ADMIN — MIDAZOLAM HYDROCHLORIDE 2 MG: 1 INJECTION, SOLUTION INTRAMUSCULAR; INTRAVENOUS at 10:54

## 2020-03-02 RX ADMIN — FENTANYL CITRATE 50 MCG: 50 INJECTION INTRAMUSCULAR; INTRAVENOUS at 10:52

## 2020-03-02 RX ADMIN — FENTANYL CITRATE 50 MCG: 50 INJECTION INTRAMUSCULAR; INTRAVENOUS at 10:57

## 2020-03-02 RX ADMIN — FENTANYL CITRATE 25 MCG: 50 INJECTION, SOLUTION INTRAMUSCULAR; INTRAVENOUS at 12:00

## 2020-03-02 RX ADMIN — PROPOFOL 50 MG: 10 INJECTION, EMULSION INTRAVENOUS at 10:57

## 2020-03-02 RX ADMIN — KETOROLAC TROMETHAMINE 30 MG: 30 INJECTION, SOLUTION INTRAMUSCULAR; INTRAVENOUS at 13:00

## 2020-03-02 RX ADMIN — FENTANYL CITRATE 25 MCG: 50 INJECTION, SOLUTION INTRAMUSCULAR; INTRAVENOUS at 12:15

## 2020-03-02 RX ADMIN — LIDOCAINE HYDROCHLORIDE 50 MG: 10 INJECTION, SOLUTION EPIDURAL; INFILTRATION; INTRACAUDAL; PERINEURAL at 10:57

## 2020-03-02 RX ADMIN — FENTANYL CITRATE 50 MCG: 50 INJECTION INTRAMUSCULAR; INTRAVENOUS at 11:50

## 2020-03-02 RX ADMIN — FENTANYL CITRATE 25 MCG: 50 INJECTION, SOLUTION INTRAMUSCULAR; INTRAVENOUS at 12:05

## 2020-03-02 RX ADMIN — FENTANYL CITRATE 12.5 MCG: 50 INJECTION, SOLUTION INTRAMUSCULAR; INTRAVENOUS at 12:40

## 2020-03-02 RX ADMIN — PROPOFOL 50 MG: 10 INJECTION, EMULSION INTRAVENOUS at 11:02

## 2020-03-02 RX ADMIN — PROPOFOL 140 MCG/KG/MIN: 10 INJECTION, EMULSION INTRAVENOUS at 10:57

## 2020-03-02 RX ADMIN — OXYCODONE HYDROCHLORIDE AND ACETAMINOPHEN 1 TABLET: 5; 325 TABLET ORAL at 13:31

## 2020-03-02 RX ADMIN — FENTANYL CITRATE 50 MCG: 50 INJECTION INTRAMUSCULAR; INTRAVENOUS at 11:53

## 2020-03-02 NOTE — INTERVAL H&P NOTE
H&P reviewed  After examining the patient I find no changes in the patients condition since the H&P had been written      Vitals:    03/02/20 0831   BP: 170/88   Pulse: 73   Resp: 18   Temp: (!) 97 4 °F (36 3 °C)   SpO2: 96%

## 2020-03-02 NOTE — DISCHARGE INSTRUCTIONS
Cigarette Smoking and Your Health, Ambulatory Care   GENERAL INFORMATION:   What are the risks to my health if I smoke? Chemicals in tobacco are addictive and damage every cell in your body  All tobacco products are dangerous to you and to nonsmokers who breathe your secondhand smoke  Even if you are a light smoker or a social smoker, you have an increased risk for cancer, heart disease, and lung disease  If you are pregnant or have diabetes, smoking increases your risk for complications  What are the benefits to my health if I stop smoking? · Lower risk of cancer, heart disease, blood clots, heart attack, and stroke    · Lower risk of diabetes complications, such as kidney, artery, or eye disease, and nerve damage that can result in amputations    · Lower risk of lung infections and diseases, such as pneumonia, asthma, chronic bronchitis, and emphysema           · Increased benefits of chemotherapy if you already have cancer, and decreased risk for cancer returning after treatment    · Improved circulation, allowing more oxygen to be delivered to your body    · Improved ability to heal and to fight infections  What are the benefits to the health of others if I stop smoking? · Lower risk of lung cancer and heart disease in nonsmokers    · Lower risk of miscarriage, early delivery, low birth weight, and stillbirth    · Lower risk of SIDS, obesity, developmental delay, ear infections, colds, pneumonia, bronchitis, asthma, and ADHD in your child  Where can I find more information and support to stop smoking? It is never too late to stop smoking  Ask your healthcare provider for more information if you need help  · [employer]  Phone: 2- 681 - [phone]  Web Address: [employer]  Follow up with your healthcare provider as directed:  Write down your questions so you remember to ask them during your visits  CARE AGREEMENT:   You have the right to help plan your care   Learn about your health condition and how it may be treated  Discuss treatment options with your caregivers to decide what care you want to receive  You always have the right to refuse treatment  The above information is an  only  It is not intended as medical advice for individual conditions or treatments  Talk to your doctor, nurse or pharmacist before following any medical regimen to see if it is safe and effective for you  © 2014 [address] is for End User's use only and may not be sold, redistributed or otherwise used for commercial purposes  All illustrations and images included in CareNotes® are the copyrighted property of A D A M , Inc  or [employer] Dur[employer]

## 2020-03-02 NOTE — ANESTHESIA POSTPROCEDURE EVALUATION
Post-Op Assessment Note    CV Status:  Stable  Pain Score: 8    Pain management: adequate     Mental Status:  Alert and awake   Hydration Status:  Euvolemic   PONV Controlled:  Controlled   Airway Patency:  Patent   Post Op Vitals Reviewed: Yes      Staff: CRNA           /78 (03/02/20 1152)    Temp 97 5 °F (36 4 °C) (03/02/20 1152)    Pulse 66 (03/02/20 1152)   Resp 16 (03/02/20 1152)    SpO2 96 % (03/02/20 1152)

## 2020-03-02 NOTE — OP NOTE
OPERATIVE REPORT  PATIENT NAME: Ava Beckford    :  1965  MRN: [id number]  Pt Location:  OR ROOM 08    SURGERY DATE: 3/2/2020    Surgeon(s) and Role:     * Muriel Cortés MD - Primary    Preop Diagnosis:  Wound of back, unspecified laterality, initial encounter [S21 A] right    Post-Op Diagnosis Codes:     * Wound of back, unspecified laterality, initial encounter [S2A] right    Procedure(s) (LRB):  EXPLORE WOUND OF BACK LEFT OPEN WITH PACKING (N/A) removal of foreign body    Specimen(s):  ID Type Source Tests Collected by Time Destination   1 : foreign body of lower back Other Foreign body TISSUE EXAM Muriel Cortés MD 3/2/2020 11:22 AM        Estimated Blood Loss:   Minimal    Drains:  * No LDAs found *    Anesthesia Type:   IV Sedation with Anesthesia    Operative Indications:  Wound of back, unspecified laterality, initial encounter [S2]  Chronic drainage    Operative Findings:  Foreign body deep in the wound  Resemble biliary calculi    Complications:   None    Procedure and Technique:  Patient brought the operating room postop table in left lateral decubitus position  The area over the right side of the back which had been previously marked was prepped and draped in usual sterile fashion  1% lidocaine was used make an incision over the area  The opening was probed with a hemostat  An incision was made extending the opening proximally and distally using 15  Scalpel blade  Once again there was a deep wound that could not be reached with a hemostat  The wound was opened 2 subsequent times to allow passage of [employer] ret[employer]ors and the gloved finger  Prior to doing this exploration of the wound revealed what appeared to be black gravelly calculi the reminiscent of biliary calculi  Further exploration revealed more of this  It was removed  The wound was probed and explored with the gloved finger and any residual was removed    There was a pocket quite deep which is most likely where the foreign body was located  The area was copiously irrigated with saline solution to irrigate any residual calculi out  There were none  The wound appeared to be clean with some nice bloody drainage  No pus no residual calculi  A calculi was sent to pathology  Bleeding was controlled electrocautery  After adequate hemostasis was obtained an open 4 x 4 was passed to the depth of the wound and then covered by 4x4s  Prior to doing this the wound was infiltrated with 0 5% Marcaine  Estimated blood loss minimal patient tolerated procedure well delivered to recovery room stable condition     I was present for the entire procedure    Patient Disposition:  PACU     SIGNATURE: Nitza Mae MD  DATE: March 2, 2020  TIME: 11:52 AM

## 2020-03-02 NOTE — NURSING NOTE
Psoriasis noted all over her body  Scabbed areas   Unable to use left arm due to injury in the past

## 2020-03-04 ENCOUNTER — OFFICE VISIT (OUTPATIENT)
Dept: SURGERY | Facility: CLINIC | Age: 55
End: 2020-03-04

## 2020-03-04 VITALS
RESPIRATION RATE: 16 BRPM | HEIGHT: 67 IN | BODY MASS INDEX: 43.79 KG/M2 | HEART RATE: 68 BPM | DIASTOLIC BLOOD PRESSURE: 96 MMHG | SYSTOLIC BLOOD PRESSURE: 190 MMHG | WEIGHT: 279 LBS

## 2020-03-04 DIAGNOSIS — S21.209A WOUND OF BACK, UNSPECIFIED LATERALITY, INITIAL ENCOUNTER: Primary | ICD-10-CM

## 2020-03-04 PROCEDURE — 99024 POSTOP FOLLOW-UP VISIT: CPT | Performed by: SPECIALIST

## 2020-03-04 NOTE — PROGRESS NOTES
Devi Sidhu presents today for postop visit status post wound exploration of her lower back  This was performed 3 days ago and a loose pack was placed  Foreign body was found in her back as the etiology of the nonhealing wound  Today in the office she said the pack fell out and she has been draining from the wound  It is more serosanguineous and not pus  She also complains of 1 of her anterior wounds from previous operation that is slightly open and draining  We will investigate this also  Physical exam:  Middle-aged obese white female awake alert no distress    Back  Transverse incision noted right side of her lumbar area  The wound looks reasonable and is quite deep  It is clean with a Q-tip and peroxide and some residual foreign body is removed albeit small  It is irrigated with peroxide and cleaned several times with an open 4 x 4  Is then dressed with 4x4s and tape  Abdomen:  Small upper abdominal incision with the apex slightly open  Serosanguineous fluid but minimal is identified  No subcutaneous pocket is appreciated  It is clean with Q-tip peroxide and then cauterized with a silver nitrate stick  Impression:  Doing well foreign body removal from back    Plan:  Return scheduled appointment in 2 weeks  Any problems call

## 2020-03-17 ENCOUNTER — OFFICE VISIT (OUTPATIENT)
Dept: SURGERY | Facility: CLINIC | Age: 55
End: 2020-03-17

## 2020-03-17 VITALS
RESPIRATION RATE: 16 BRPM | DIASTOLIC BLOOD PRESSURE: 90 MMHG | SYSTOLIC BLOOD PRESSURE: 166 MMHG | BODY MASS INDEX: 43.79 KG/M2 | HEIGHT: 67 IN | WEIGHT: 279 LBS | HEART RATE: 62 BPM

## 2020-03-17 DIAGNOSIS — S31.109A OPEN WOUND OF ABDOMINAL WALL, INITIAL ENCOUNTER: ICD-10-CM

## 2020-03-17 DIAGNOSIS — S21.209A WOUND OF BACK, UNSPECIFIED LATERALITY, INITIAL ENCOUNTER: Primary | ICD-10-CM

## 2020-03-17 PROCEDURE — 99024 POSTOP FOLLOW-UP VISIT: CPT | Performed by: SPECIALIST

## 2020-03-17 NOTE — PROGRESS NOTES
Gabrielle Mast presents today for follow-up visit status post removal of foreign body from her back  Also she had suture granuloma was removed from her abdomen and she says 1 of them is draining  The back wound has started to hurt her more  She denies any significant drainage from the wound  It hurts when she moves  The abdominal wound which had healed opened up and some bloody drainage has come from this  No pus or foreign body from this  Of course it hurts  Physical exam:  Middle-aged obese white female awake alert no distress    Back:  Transverse incision in the right side of the lumbar area  It is somewhat indented  It is cleaned with a Q-tip and peroxide  Some pink serosanguineous fluid is removed  No pus  Subcu tissue somewhat devitalized and lightly debrided  Dry sterile dressings applied  Abdomen:  Previous small longitudinal incision has a small opening with some bloody drainage  It is probed  No pus  No suture granuloma  Clean with a Q-tip peroxide  Dry sterile dressings applied  Impression:  Wound issues  Plan:  Local care as performed  Empiric antibiotics for about a week  Return here in about a week

## 2020-04-08 ENCOUNTER — OFFICE VISIT (OUTPATIENT)
Dept: SURGERY | Facility: CLINIC | Age: 55
End: 2020-04-08

## 2020-04-08 VITALS
DIASTOLIC BLOOD PRESSURE: 88 MMHG | BODY MASS INDEX: 43.79 KG/M2 | WEIGHT: 279 LBS | SYSTOLIC BLOOD PRESSURE: 142 MMHG | TEMPERATURE: 98.5 F | HEIGHT: 67 IN | HEART RATE: 90 BPM

## 2020-04-08 DIAGNOSIS — T81.89XA SUTURE GRANULOMA, INITIAL ENCOUNTER: ICD-10-CM

## 2020-04-08 DIAGNOSIS — S21.209A WOUND OF BACK, UNSPECIFIED LATERALITY, INITIAL ENCOUNTER: Primary | ICD-10-CM

## 2020-04-08 PROCEDURE — 99024 POSTOP FOLLOW-UP VISIT: CPT | Performed by: SPECIALIST

## 2020-04-08 RX ORDER — OXYCODONE HYDROCHLORIDE AND ACETAMINOPHEN 5; 325 MG/1; MG/1
1 TABLET ORAL EVERY 4 HOURS PRN
COMMUNITY
Start: 2020-03-17 | End: 2020-05-20 | Stop reason: HOSPADM

## 2020-04-08 RX ORDER — SULFAMETHOXAZOLE AND TRIMETHOPRIM 800; 160 MG/1; MG/1
1 TABLET ORAL 2 TIMES DAILY
COMMUNITY
Start: 2020-03-25 | End: 2020-05-20 | Stop reason: HOSPADM

## 2020-04-29 ENCOUNTER — OFFICE VISIT (OUTPATIENT)
Dept: SURGERY | Facility: CLINIC | Age: 55
End: 2020-04-29
Payer: COMMERCIAL

## 2020-04-29 VITALS
DIASTOLIC BLOOD PRESSURE: 86 MMHG | HEIGHT: 67 IN | BODY MASS INDEX: 43.79 KG/M2 | WEIGHT: 279 LBS | TEMPERATURE: 97.7 F | HEART RATE: 90 BPM | SYSTOLIC BLOOD PRESSURE: 182 MMHG

## 2020-04-29 DIAGNOSIS — T81.89XD SUTURE GRANULOMA, SUBSEQUENT ENCOUNTER: Primary | ICD-10-CM

## 2020-04-29 PROCEDURE — 99213 OFFICE O/P EST LOW 20 MIN: CPT | Performed by: SPECIALIST

## 2020-05-13 ENCOUNTER — OFFICE VISIT (OUTPATIENT)
Dept: SURGERY | Facility: CLINIC | Age: 55
End: 2020-05-13
Payer: COMMERCIAL

## 2020-05-13 VITALS
TEMPERATURE: 97.7 F | BODY MASS INDEX: 43.7 KG/M2 | HEART RATE: 103 BPM | DIASTOLIC BLOOD PRESSURE: 92 MMHG | HEIGHT: 67 IN | SYSTOLIC BLOOD PRESSURE: 168 MMHG

## 2020-05-13 DIAGNOSIS — T81.89XD SUTURE GRANULOMA, SUBSEQUENT ENCOUNTER: Primary | ICD-10-CM

## 2020-05-13 PROCEDURE — 99213 OFFICE O/P EST LOW 20 MIN: CPT | Performed by: SPECIALIST

## 2020-05-17 ENCOUNTER — APPOINTMENT (EMERGENCY)
Dept: CT IMAGING | Facility: HOSPITAL | Age: 55
DRG: 155 | End: 2020-05-17
Payer: COMMERCIAL

## 2020-05-17 ENCOUNTER — HOSPITAL ENCOUNTER (INPATIENT)
Facility: HOSPITAL | Age: 55
LOS: 3 days | Discharge: HOME | DRG: 155 | End: 2020-05-20
Attending: EMERGENCY MEDICINE | Admitting: HOSPITALIST
Payer: COMMERCIAL

## 2020-05-17 DIAGNOSIS — J32.9 SINUSITIS: ICD-10-CM

## 2020-05-17 DIAGNOSIS — K11.20 SUBMANDIBULAR SIALOADENITIS: ICD-10-CM

## 2020-05-17 DIAGNOSIS — K11.20 SIALOADENITIS OF SUBMANDIBULAR GLAND: Primary | ICD-10-CM

## 2020-05-17 DIAGNOSIS — K11.5 SIALOLITH: ICD-10-CM

## 2020-05-17 DIAGNOSIS — D64.9 CHRONIC ANEMIA: ICD-10-CM

## 2020-05-17 DIAGNOSIS — T81.30XA WOUND DEHISCENCE: ICD-10-CM

## 2020-05-17 PROBLEM — G47.00 INSOMNIA: Chronic | Status: ACTIVE | Noted: 2020-05-17

## 2020-05-17 PROBLEM — R79.89 ELEVATED TSH: Status: ACTIVE | Noted: 2020-05-17

## 2020-05-17 PROBLEM — F17.200 TOBACCO DEPENDENCE: Chronic | Status: ACTIVE | Noted: 2020-05-17

## 2020-05-17 PROBLEM — E87.1 HYPONATREMIA: Status: ACTIVE | Noted: 2020-05-17

## 2020-05-17 PROBLEM — J01.00 ACUTE MAXILLARY SINUSITIS: Status: ACTIVE | Noted: 2020-05-17

## 2020-05-17 LAB
ALBUMIN SERPL BCP-MCNC: 3.6 G/DL (ref 3.5–5)
ALP SERPL-CCNC: 109 U/L (ref 46–116)
ALT SERPL W P-5'-P-CCNC: 18 U/L (ref 12–78)
ANION GAP SERPL CALCULATED.3IONS-SCNC: 12 MMOL/L (ref 4–13)
APTT PPP: 28 SECONDS (ref 23–37)
AST SERPL W P-5'-P-CCNC: 38 U/L (ref 5–45)
BASOPHILS # BLD AUTO: 0.05 THOUSANDS/ΜL (ref 0–0.1)
BASOPHILS NFR BLD AUTO: 1 % (ref 0–1)
BILIRUB SERPL-MCNC: 0.84 MG/DL (ref 0.2–1)
BUN SERPL-MCNC: 14 MG/DL (ref 5–25)
CALCIUM SERPL-MCNC: 8.7 MG/DL (ref 8.3–10.1)
CHLORIDE SERPL-SCNC: 98 MMOL/L (ref 100–108)
CO2 SERPL-SCNC: 23 MMOL/L (ref 21–32)
CREAT SERPL-MCNC: 0.76 MG/DL (ref 0.6–1.3)
EOSINOPHIL # BLD AUTO: 0.16 THOUSAND/ΜL (ref 0–0.61)
EOSINOPHIL NFR BLD AUTO: 2 % (ref 0–6)
ERYTHROCYTE [DISTWIDTH] IN BLOOD BY AUTOMATED COUNT: 19.3 % (ref 11.6–15.1)
GFR SERPL CREATININE-BSD FRML MDRD: 89 ML/MIN/1.73SQ M
GLUCOSE SERPL-MCNC: 110 MG/DL (ref 65–140)
HCT VFR BLD AUTO: 34 % (ref 34.8–46.1)
HGB BLD-MCNC: 9.7 G/DL (ref 11.5–15.4)
IMM GRANULOCYTES # BLD AUTO: 0.04 THOUSAND/UL (ref 0–0.2)
IMM GRANULOCYTES NFR BLD AUTO: 0 % (ref 0–2)
INR PPP: 1.02 (ref 0.84–1.19)
LACTATE SERPL-SCNC: 1.2 MMOL/L (ref 0.5–2)
LYMPHOCYTES # BLD AUTO: 0.88 THOUSANDS/ΜL (ref 0.6–4.47)
LYMPHOCYTES NFR BLD AUTO: 8 % (ref 14–44)
MCH RBC QN AUTO: 20.5 PG (ref 26.8–34.3)
MCHC RBC AUTO-ENTMCNC: 28.5 G/DL (ref 31.4–37.4)
MCV RBC AUTO: 72 FL (ref 82–98)
MONOCYTES # BLD AUTO: 0.72 THOUSAND/ΜL (ref 0.17–1.22)
MONOCYTES NFR BLD AUTO: 7 % (ref 4–12)
NEUTROPHILS # BLD AUTO: 8.85 THOUSANDS/ΜL (ref 1.85–7.62)
NEUTS SEG NFR BLD AUTO: 82 % (ref 43–75)
NRBC BLD AUTO-RTO: 0 /100 WBCS
PLATELET # BLD AUTO: 277 THOUSANDS/UL (ref 149–390)
PMV BLD AUTO: 10 FL (ref 8.9–12.7)
POTASSIUM SERPL-SCNC: 4.3 MMOL/L (ref 3.5–5.3)
PROCALCITONIN SERPL-MCNC: <0.05 NG/ML
PROT SERPL-MCNC: 8.5 G/DL (ref 6.4–8.2)
PROTHROMBIN TIME: 13.5 SECONDS (ref 11.6–14.5)
RBC # BLD AUTO: 4.73 MILLION/UL (ref 3.81–5.12)
SODIUM SERPL-SCNC: 133 MMOL/L (ref 136–145)
T4 FREE SERPL-MCNC: 0.88 NG/DL (ref 0.76–1.46)
TSH SERPL DL<=0.05 MIU/L-ACNC: 5.25 UIU/ML (ref 0.36–3.74)
WBC # BLD AUTO: 10.7 THOUSAND/UL (ref 4.31–10.16)

## 2020-05-17 PROCEDURE — 84145 PROCALCITONIN (PCT): CPT | Performed by: EMERGENCY MEDICINE

## 2020-05-17 PROCEDURE — 99285 EMERGENCY DEPT VISIT HI MDM: CPT | Performed by: EMERGENCY MEDICINE

## 2020-05-17 PROCEDURE — 99285 EMERGENCY DEPT VISIT HI MDM: CPT

## 2020-05-17 PROCEDURE — 36415 COLL VENOUS BLD VENIPUNCTURE: CPT | Performed by: EMERGENCY MEDICINE

## 2020-05-17 PROCEDURE — 80053 COMPREHEN METABOLIC PANEL: CPT | Performed by: EMERGENCY MEDICINE

## 2020-05-17 PROCEDURE — 96361 HYDRATE IV INFUSION ADD-ON: CPT

## 2020-05-17 PROCEDURE — 85610 PROTHROMBIN TIME: CPT | Performed by: EMERGENCY MEDICINE

## 2020-05-17 PROCEDURE — 99223 1ST HOSP IP/OBS HIGH 75: CPT | Performed by: INTERNAL MEDICINE

## 2020-05-17 PROCEDURE — 94640 AIRWAY INHALATION TREATMENT: CPT

## 2020-05-17 PROCEDURE — 84439 ASSAY OF FREE THYROXINE: CPT | Performed by: EMERGENCY MEDICINE

## 2020-05-17 PROCEDURE — 87040 BLOOD CULTURE FOR BACTERIA: CPT | Performed by: EMERGENCY MEDICINE

## 2020-05-17 PROCEDURE — 84443 ASSAY THYROID STIM HORMONE: CPT | Performed by: EMERGENCY MEDICINE

## 2020-05-17 PROCEDURE — 96375 TX/PRO/DX INJ NEW DRUG ADDON: CPT

## 2020-05-17 PROCEDURE — 96376 TX/PRO/DX INJ SAME DRUG ADON: CPT

## 2020-05-17 PROCEDURE — 85730 THROMBOPLASTIN TIME PARTIAL: CPT | Performed by: EMERGENCY MEDICINE

## 2020-05-17 PROCEDURE — 70491 CT SOFT TISSUE NECK W/DYE: CPT

## 2020-05-17 PROCEDURE — 83605 ASSAY OF LACTIC ACID: CPT | Performed by: EMERGENCY MEDICINE

## 2020-05-17 PROCEDURE — 96365 THER/PROPH/DIAG IV INF INIT: CPT

## 2020-05-17 PROCEDURE — 85025 COMPLETE CBC W/AUTO DIFF WBC: CPT | Performed by: EMERGENCY MEDICINE

## 2020-05-17 RX ORDER — CHLORHEXIDINE GLUCONATE 0.12 MG/ML
15 RINSE ORAL 3 TIMES DAILY
Status: DISCONTINUED | OUTPATIENT
Start: 2020-05-17 | End: 2020-05-20 | Stop reason: HOSPADM

## 2020-05-17 RX ORDER — CLOBETASOL PROPIONATE 0.5 MG/G
CREAM TOPICAL 2 TIMES DAILY
Status: DISCONTINUED | OUTPATIENT
Start: 2020-05-17 | End: 2020-05-20 | Stop reason: HOSPADM

## 2020-05-17 RX ORDER — OXYCODONE HYDROCHLORIDE 5 MG/1
5 TABLET ORAL EVERY 4 HOURS PRN
Status: DISCONTINUED | OUTPATIENT
Start: 2020-05-17 | End: 2020-05-20 | Stop reason: HOSPADM

## 2020-05-17 RX ORDER — TRAZODONE HYDROCHLORIDE 100 MG/1
300 TABLET ORAL
Status: DISCONTINUED | OUTPATIENT
Start: 2020-05-17 | End: 2020-05-20 | Stop reason: HOSPADM

## 2020-05-17 RX ORDER — PANTOPRAZOLE SODIUM 20 MG/1
20 TABLET, DELAYED RELEASE ORAL
Status: DISCONTINUED | OUTPATIENT
Start: 2020-05-17 | End: 2020-05-20 | Stop reason: HOSPADM

## 2020-05-17 RX ORDER — HYDROMORPHONE HCL/PF 1 MG/ML
1 SYRINGE (ML) INJECTION ONCE AS NEEDED
Status: DISCONTINUED | OUTPATIENT
Start: 2020-05-17 | End: 2020-05-17

## 2020-05-17 RX ORDER — TRIAMCINOLONE ACETONIDE 5 MG/G
CREAM TOPICAL 2 TIMES DAILY
Status: DISCONTINUED | OUTPATIENT
Start: 2020-05-17 | End: 2020-05-17

## 2020-05-17 RX ORDER — TRAMADOL HYDROCHLORIDE 50 MG/1
50 TABLET ORAL EVERY 6 HOURS PRN
Status: DISCONTINUED | OUTPATIENT
Start: 2020-05-17 | End: 2020-05-17

## 2020-05-17 RX ORDER — KETOROLAC TROMETHAMINE 30 MG/ML
30 INJECTION, SOLUTION INTRAMUSCULAR; INTRAVENOUS EVERY 6 HOURS PRN
Status: DISPENSED | OUTPATIENT
Start: 2020-05-17 | End: 2020-05-19

## 2020-05-17 RX ORDER — ACETAMINOPHEN 325 MG/1
650 TABLET ORAL EVERY 6 HOURS PRN
Status: DISCONTINUED | OUTPATIENT
Start: 2020-05-17 | End: 2020-05-20 | Stop reason: HOSPADM

## 2020-05-17 RX ORDER — KETOROLAC TROMETHAMINE 30 MG/ML
30 INJECTION, SOLUTION INTRAMUSCULAR; INTRAVENOUS EVERY 6 HOURS PRN
Status: DISCONTINUED | OUTPATIENT
Start: 2020-05-17 | End: 2020-05-17

## 2020-05-17 RX ORDER — CLINDAMYCIN PHOSPHATE 600 MG/50ML
600 INJECTION INTRAVENOUS ONCE
Status: COMPLETED | OUTPATIENT
Start: 2020-05-17 | End: 2020-05-17

## 2020-05-17 RX ORDER — NICOTINE 21 MG/24HR
1 PATCH, TRANSDERMAL 24 HOURS TRANSDERMAL DAILY
Status: DISCONTINUED | OUTPATIENT
Start: 2020-05-17 | End: 2020-05-20 | Stop reason: HOSPADM

## 2020-05-17 RX ORDER — ONDANSETRON HYDROCHLORIDE 4 MG/5ML
4 SOLUTION ORAL ONCE
Status: COMPLETED | OUTPATIENT
Start: 2020-05-17 | End: 2020-05-17

## 2020-05-17 RX ORDER — HYDROMORPHONE HCL/PF 1 MG/ML
1 SYRINGE (ML) INJECTION ONCE
Status: COMPLETED | OUTPATIENT
Start: 2020-05-17 | End: 2020-05-17

## 2020-05-17 RX ORDER — HYDROMORPHONE HCL/PF 1 MG/ML
0.5 SYRINGE (ML) INJECTION EVERY 4 HOURS PRN
Status: DISCONTINUED | OUTPATIENT
Start: 2020-05-17 | End: 2020-05-17

## 2020-05-17 RX ORDER — HYDROMORPHONE HCL/PF 1 MG/ML
0.5 SYRINGE (ML) INJECTION EVERY 4 HOURS PRN
Status: DISCONTINUED | OUTPATIENT
Start: 2020-05-17 | End: 2020-05-20 | Stop reason: HOSPADM

## 2020-05-17 RX ORDER — ONDANSETRON 2 MG/ML
4 INJECTION INTRAMUSCULAR; INTRAVENOUS ONCE AS NEEDED
Status: DISCONTINUED | OUTPATIENT
Start: 2020-05-17 | End: 2020-05-17

## 2020-05-17 RX ORDER — DEXAMETHASONE SODIUM PHOSPHATE 4 MG/ML
10 INJECTION, SOLUTION INTRA-ARTICULAR; INTRALESIONAL; INTRAMUSCULAR; INTRAVENOUS; SOFT TISSUE ONCE
Status: COMPLETED | OUTPATIENT
Start: 2020-05-17 | End: 2020-05-17

## 2020-05-17 RX ORDER — ONDANSETRON 2 MG/ML
4 INJECTION INTRAMUSCULAR; INTRAVENOUS EVERY 6 HOURS PRN
Status: DISCONTINUED | OUTPATIENT
Start: 2020-05-17 | End: 2020-05-20 | Stop reason: HOSPADM

## 2020-05-17 RX ORDER — FLUTICASONE PROPIONATE 50 MCG
2 SPRAY, SUSPENSION (ML) NASAL 2 TIMES DAILY
Status: DISCONTINUED | OUTPATIENT
Start: 2020-05-17 | End: 2020-05-20 | Stop reason: HOSPADM

## 2020-05-17 RX ORDER — ALBUTEROL SULFATE 90 UG/1
2 AEROSOL, METERED RESPIRATORY (INHALATION) EVERY 6 HOURS PRN
Status: DISCONTINUED | OUTPATIENT
Start: 2020-05-17 | End: 2020-05-20 | Stop reason: HOSPADM

## 2020-05-17 RX ORDER — FENTANYL CITRATE 50 UG/ML
25 INJECTION, SOLUTION INTRAMUSCULAR; INTRAVENOUS ONCE
Status: COMPLETED | OUTPATIENT
Start: 2020-05-17 | End: 2020-05-17

## 2020-05-17 RX ORDER — METHYLPREDNISOLONE SODIUM SUCCINATE 40 MG/ML
40 INJECTION, POWDER, LYOPHILIZED, FOR SOLUTION INTRAMUSCULAR; INTRAVENOUS DAILY
Status: DISCONTINUED | OUTPATIENT
Start: 2020-05-17 | End: 2020-05-20 | Stop reason: HOSPADM

## 2020-05-17 RX ORDER — LIDOCAINE HYDROCHLORIDE 20 MG/ML
15 SOLUTION OROPHARYNGEAL ONCE
Status: COMPLETED | OUTPATIENT
Start: 2020-05-17 | End: 2020-05-17

## 2020-05-17 RX ORDER — CLINDAMYCIN PHOSPHATE 600 MG/50ML
600 INJECTION INTRAVENOUS EVERY 8 HOURS
Status: DISCONTINUED | OUTPATIENT
Start: 2020-05-17 | End: 2020-05-20 | Stop reason: HOSPADM

## 2020-05-17 RX ORDER — FENTANYL CITRATE 50 UG/ML
50 INJECTION, SOLUTION INTRAMUSCULAR; INTRAVENOUS ONCE
Status: COMPLETED | OUTPATIENT
Start: 2020-05-17 | End: 2020-05-17

## 2020-05-17 RX ORDER — BUDESONIDE AND FORMOTEROL FUMARATE DIHYDRATE 80; 4.5 UG/1; UG/1
2 AEROSOL RESPIRATORY (INHALATION) 2 TIMES DAILY
Status: DISCONTINUED | OUTPATIENT
Start: 2020-05-17 | End: 2020-05-20 | Stop reason: HOSPADM

## 2020-05-17 RX ORDER — LORATADINE 10 MG/1
10 TABLET ORAL DAILY
Status: DISCONTINUED | OUTPATIENT
Start: 2020-05-17 | End: 2020-05-20 | Stop reason: HOSPADM

## 2020-05-17 RX ADMIN — FLUTICASONE PROPIONATE 2 SPRAY: 50 SPRAY, METERED NASAL at 08:30

## 2020-05-17 RX ADMIN — CHLORHEXIDINE GLUCONATE 0.12% ORAL RINSE 15 ML: 1.2 LIQUID ORAL at 17:23

## 2020-05-17 RX ADMIN — METHYLPREDNISOLONE SODIUM SUCCINATE 40 MG: 40 INJECTION, POWDER, FOR SOLUTION INTRAMUSCULAR; INTRAVENOUS at 08:31

## 2020-05-17 RX ADMIN — CHLORHEXIDINE GLUCONATE 0.12% ORAL RINSE 15 ML: 1.2 LIQUID ORAL at 21:39

## 2020-05-17 RX ADMIN — HYDROMORPHONE HYDROCHLORIDE 0.5 MG: 1 INJECTION, SOLUTION INTRAMUSCULAR; INTRAVENOUS; SUBCUTANEOUS at 15:14

## 2020-05-17 RX ADMIN — ALBUTEROL SULFATE 2 PUFF: 90 AEROSOL, METERED RESPIRATORY (INHALATION) at 07:14

## 2020-05-17 RX ADMIN — ONDANSETRON 4 MG: 4 SOLUTION ORAL at 02:06

## 2020-05-17 RX ADMIN — RACEPINEPHRINE HYDROCHLORIDE 0.5 ML: 11.25 SOLUTION RESPIRATORY (INHALATION) at 02:14

## 2020-05-17 RX ADMIN — CLINDAMYCIN IN 5 PERCENT DEXTROSE 600 MG: 12 INJECTION, SOLUTION INTRAVENOUS at 17:23

## 2020-05-17 RX ADMIN — BUDESONIDE AND FORMOTEROL FUMARATE DIHYDRATE 2 PUFF: 80; 4.5 AEROSOL RESPIRATORY (INHALATION) at 08:30

## 2020-05-17 RX ADMIN — CLOBETASOL PROPIONATE: 0.5 CREAM TOPICAL at 17:23

## 2020-05-17 RX ADMIN — ENOXAPARIN SODIUM 40 MG: 40 INJECTION SUBCUTANEOUS at 08:30

## 2020-05-17 RX ADMIN — IOHEXOL 85 ML: 350 INJECTION, SOLUTION INTRAVENOUS at 03:48

## 2020-05-17 RX ADMIN — HYDROMORPHONE HYDROCHLORIDE 1 MG: 1 INJECTION, SOLUTION INTRAMUSCULAR; INTRAVENOUS; SUBCUTANEOUS at 04:21

## 2020-05-17 RX ADMIN — FENTANYL CITRATE 50 MCG: 50 INJECTION, SOLUTION INTRAMUSCULAR; INTRAVENOUS at 02:39

## 2020-05-17 RX ADMIN — PANTOPRAZOLE SODIUM 20 MG: 20 TABLET, DELAYED RELEASE ORAL at 06:21

## 2020-05-17 RX ADMIN — NICOTINE 1 PATCH: 14 PATCH TRANSDERMAL at 08:31

## 2020-05-17 RX ADMIN — FENTANYL CITRATE 25 MCG: 50 INJECTION, SOLUTION INTRAMUSCULAR; INTRAVENOUS at 02:06

## 2020-05-17 RX ADMIN — CLINDAMYCIN PHOSPHATE 600 MG: 600 INJECTION, SOLUTION INTRAVENOUS at 02:18

## 2020-05-17 RX ADMIN — LORATADINE 10 MG: 10 TABLET ORAL at 08:31

## 2020-05-17 RX ADMIN — CHLORHEXIDINE GLUCONATE 0.12% ORAL RINSE 15 ML: 1.2 LIQUID ORAL at 08:30

## 2020-05-17 RX ADMIN — FLUTICASONE PROPIONATE 2 SPRAY: 50 SPRAY, METERED NASAL at 17:23

## 2020-05-17 RX ADMIN — KETOROLAC TROMETHAMINE 30 MG: 30 INJECTION, SOLUTION INTRAMUSCULAR at 19:35

## 2020-05-17 RX ADMIN — KETOROLAC TROMETHAMINE 30 MG: 30 INJECTION, SOLUTION INTRAMUSCULAR at 12:31

## 2020-05-17 RX ADMIN — DEXAMETHASONE SODIUM PHOSPHATE 10 MG: 4 INJECTION, SOLUTION INTRAMUSCULAR; INTRAVENOUS at 02:15

## 2020-05-17 RX ADMIN — HYDROMORPHONE HYDROCHLORIDE 0.5 MG: 1 INJECTION, SOLUTION INTRAMUSCULAR; INTRAVENOUS; SUBCUTANEOUS at 23:01

## 2020-05-17 RX ADMIN — TRAZODONE HYDROCHLORIDE 300 MG: 100 TABLET ORAL at 23:01

## 2020-05-17 RX ADMIN — LIDOCAINE HYDROCHLORIDE 15 ML: 20 SOLUTION ORAL; TOPICAL at 03:45

## 2020-05-17 RX ADMIN — SODIUM CHLORIDE 1000 ML: 0.9 INJECTION, SOLUTION INTRAVENOUS at 02:14

## 2020-05-17 RX ADMIN — CLOBETASOL PROPIONATE: 0.5 CREAM TOPICAL at 11:07

## 2020-05-17 RX ADMIN — HYDROMORPHONE HYDROCHLORIDE 0.5 MG: 1 INJECTION, SOLUTION INTRAMUSCULAR; INTRAVENOUS; SUBCUTANEOUS at 08:31

## 2020-05-17 RX ADMIN — BUDESONIDE AND FORMOTEROL FUMARATE DIHYDRATE 2 PUFF: 80; 4.5 AEROSOL RESPIRATORY (INHALATION) at 17:23

## 2020-05-17 RX ADMIN — KETOROLAC TROMETHAMINE 30 MG: 30 INJECTION, SOLUTION INTRAMUSCULAR at 06:21

## 2020-05-17 RX ADMIN — CLINDAMYCIN IN 5 PERCENT DEXTROSE 600 MG: 12 INJECTION, SOLUTION INTRAVENOUS at 11:10

## 2020-05-18 LAB
ANION GAP SERPL CALCULATED.3IONS-SCNC: 8 MMOL/L (ref 4–13)
BASOPHILS # BLD AUTO: 0.02 THOUSANDS/ΜL (ref 0–0.1)
BASOPHILS NFR BLD AUTO: 0 % (ref 0–1)
BUN SERPL-MCNC: 18 MG/DL (ref 5–25)
CALCIUM SERPL-MCNC: 8.4 MG/DL (ref 8.3–10.1)
CHLORIDE SERPL-SCNC: 102 MMOL/L (ref 100–108)
CO2 SERPL-SCNC: 26 MMOL/L (ref 21–32)
CREAT SERPL-MCNC: 0.73 MG/DL (ref 0.6–1.3)
EOSINOPHIL # BLD AUTO: 0.07 THOUSAND/ΜL (ref 0–0.61)
EOSINOPHIL NFR BLD AUTO: 1 % (ref 0–6)
ERYTHROCYTE [DISTWIDTH] IN BLOOD BY AUTOMATED COUNT: 19.5 % (ref 11.6–15.1)
GFR SERPL CREATININE-BSD FRML MDRD: 94 ML/MIN/1.73SQ M
GLUCOSE SERPL-MCNC: 98 MG/DL (ref 65–140)
HCT VFR BLD AUTO: 29 % (ref 34.8–46.1)
HGB BLD-MCNC: 8 G/DL (ref 11.5–15.4)
IMM GRANULOCYTES # BLD AUTO: 0.02 THOUSAND/UL (ref 0–0.2)
IMM GRANULOCYTES NFR BLD AUTO: 0 % (ref 0–2)
LYMPHOCYTES # BLD AUTO: 1.17 THOUSANDS/ΜL (ref 0.6–4.47)
LYMPHOCYTES NFR BLD AUTO: 17 % (ref 14–44)
MCH RBC QN AUTO: 20.4 PG (ref 26.8–34.3)
MCHC RBC AUTO-ENTMCNC: 27.6 G/DL (ref 31.4–37.4)
MCV RBC AUTO: 74 FL (ref 82–98)
MONOCYTES # BLD AUTO: 0.66 THOUSAND/ΜL (ref 0.17–1.22)
MONOCYTES NFR BLD AUTO: 10 % (ref 4–12)
NEUTROPHILS # BLD AUTO: 4.98 THOUSANDS/ΜL (ref 1.85–7.62)
NEUTS SEG NFR BLD AUTO: 72 % (ref 43–75)
NRBC BLD AUTO-RTO: 0 /100 WBCS
PLATELET # BLD AUTO: 230 THOUSANDS/UL (ref 149–390)
PMV BLD AUTO: 10.4 FL (ref 8.9–12.7)
POTASSIUM SERPL-SCNC: 3.8 MMOL/L (ref 3.5–5.3)
RBC # BLD AUTO: 3.92 MILLION/UL (ref 3.81–5.12)
SODIUM SERPL-SCNC: 136 MMOL/L (ref 136–145)
WBC # BLD AUTO: 6.92 THOUSAND/UL (ref 4.31–10.16)

## 2020-05-18 PROCEDURE — 85025 COMPLETE CBC W/AUTO DIFF WBC: CPT | Performed by: NURSE PRACTITIONER

## 2020-05-18 PROCEDURE — 99232 SBSQ HOSP IP/OBS MODERATE 35: CPT | Performed by: HOSPITALIST

## 2020-05-18 PROCEDURE — 80048 BASIC METABOLIC PNL TOTAL CA: CPT | Performed by: NURSE PRACTITIONER

## 2020-05-18 RX ADMIN — CHLORHEXIDINE GLUCONATE 0.12% ORAL RINSE 15 ML: 1.2 LIQUID ORAL at 21:15

## 2020-05-18 RX ADMIN — CLOBETASOL PROPIONATE: 0.5 CREAM TOPICAL at 08:09

## 2020-05-18 RX ADMIN — CLINDAMYCIN IN 5 PERCENT DEXTROSE 600 MG: 12 INJECTION, SOLUTION INTRAVENOUS at 11:04

## 2020-05-18 RX ADMIN — HYDROMORPHONE HYDROCHLORIDE 0.5 MG: 1 INJECTION, SOLUTION INTRAMUSCULAR; INTRAVENOUS; SUBCUTANEOUS at 08:08

## 2020-05-18 RX ADMIN — CHLORHEXIDINE GLUCONATE 0.12% ORAL RINSE 15 ML: 1.2 LIQUID ORAL at 08:09

## 2020-05-18 RX ADMIN — LORATADINE 10 MG: 10 TABLET ORAL at 08:16

## 2020-05-18 RX ADMIN — KETOROLAC TROMETHAMINE 30 MG: 30 INJECTION, SOLUTION INTRAMUSCULAR at 17:55

## 2020-05-18 RX ADMIN — HYDROMORPHONE HYDROCHLORIDE 0.5 MG: 1 INJECTION, SOLUTION INTRAMUSCULAR; INTRAVENOUS; SUBCUTANEOUS at 22:11

## 2020-05-18 RX ADMIN — CHLORHEXIDINE GLUCONATE 0.12% ORAL RINSE 15 ML: 1.2 LIQUID ORAL at 16:12

## 2020-05-18 RX ADMIN — PANTOPRAZOLE SODIUM 20 MG: 20 TABLET, DELAYED RELEASE ORAL at 05:18

## 2020-05-18 RX ADMIN — FLUTICASONE PROPIONATE 2 SPRAY: 50 SPRAY, METERED NASAL at 17:56

## 2020-05-18 RX ADMIN — NICOTINE 1 PATCH: 14 PATCH TRANSDERMAL at 08:09

## 2020-05-18 RX ADMIN — CLINDAMYCIN IN 5 PERCENT DEXTROSE 600 MG: 12 INJECTION, SOLUTION INTRAVENOUS at 01:40

## 2020-05-18 RX ADMIN — METHYLPREDNISOLONE SODIUM SUCCINATE 40 MG: 40 INJECTION, POWDER, FOR SOLUTION INTRAMUSCULAR; INTRAVENOUS at 08:09

## 2020-05-18 RX ADMIN — BUDESONIDE AND FORMOTEROL FUMARATE DIHYDRATE 2 PUFF: 80; 4.5 AEROSOL RESPIRATORY (INHALATION) at 08:09

## 2020-05-18 RX ADMIN — OXYCODONE HYDROCHLORIDE 5 MG: 5 TABLET ORAL at 21:14

## 2020-05-18 RX ADMIN — FLUTICASONE PROPIONATE 2 SPRAY: 50 SPRAY, METERED NASAL at 08:09

## 2020-05-18 RX ADMIN — CLINDAMYCIN IN 5 PERCENT DEXTROSE 600 MG: 12 INJECTION, SOLUTION INTRAVENOUS at 17:55

## 2020-05-18 RX ADMIN — HYDROMORPHONE HYDROCHLORIDE 0.5 MG: 1 INJECTION, SOLUTION INTRAMUSCULAR; INTRAVENOUS; SUBCUTANEOUS at 16:12

## 2020-05-18 RX ADMIN — KETOROLAC TROMETHAMINE 30 MG: 30 INJECTION, SOLUTION INTRAMUSCULAR at 11:09

## 2020-05-18 RX ADMIN — ENOXAPARIN SODIUM 40 MG: 40 INJECTION SUBCUTANEOUS at 08:09

## 2020-05-18 RX ADMIN — TRAZODONE HYDROCHLORIDE 300 MG: 100 TABLET ORAL at 22:14

## 2020-05-18 RX ADMIN — BUDESONIDE AND FORMOTEROL FUMARATE DIHYDRATE 2 PUFF: 80; 4.5 AEROSOL RESPIRATORY (INHALATION) at 19:15

## 2020-05-19 PROBLEM — T81.30XA WOUND DEHISCENCE: Status: ACTIVE | Noted: 2020-05-19

## 2020-05-19 LAB
ANION GAP SERPL CALCULATED.3IONS-SCNC: 7 MMOL/L (ref 4–13)
BASOPHILS # BLD AUTO: 0.02 THOUSANDS/ΜL (ref 0–0.1)
BASOPHILS NFR BLD AUTO: 0 % (ref 0–1)
BUN SERPL-MCNC: 18 MG/DL (ref 5–25)
CALCIUM SERPL-MCNC: 8.1 MG/DL (ref 8.3–10.1)
CHLORIDE SERPL-SCNC: 103 MMOL/L (ref 100–108)
CO2 SERPL-SCNC: 25 MMOL/L (ref 21–32)
CREAT SERPL-MCNC: 0.74 MG/DL (ref 0.6–1.3)
EOSINOPHIL # BLD AUTO: 0.06 THOUSAND/ΜL (ref 0–0.61)
EOSINOPHIL NFR BLD AUTO: 1 % (ref 0–6)
ERYTHROCYTE [DISTWIDTH] IN BLOOD BY AUTOMATED COUNT: 19.6 % (ref 11.6–15.1)
GFR SERPL CREATININE-BSD FRML MDRD: 92 ML/MIN/1.73SQ M
GLUCOSE SERPL-MCNC: 90 MG/DL (ref 65–140)
HCT VFR BLD AUTO: 27.8 % (ref 34.8–46.1)
HGB BLD-MCNC: 7.8 G/DL (ref 11.5–15.4)
IMM GRANULOCYTES # BLD AUTO: 0.01 THOUSAND/UL (ref 0–0.2)
IMM GRANULOCYTES NFR BLD AUTO: 0 % (ref 0–2)
LYMPHOCYTES # BLD AUTO: 1.31 THOUSANDS/ΜL (ref 0.6–4.47)
LYMPHOCYTES NFR BLD AUTO: 26 % (ref 14–44)
MAGNESIUM SERPL-MCNC: 2.5 MG/DL (ref 1.6–2.6)
MCH RBC QN AUTO: 20.9 PG (ref 26.8–34.3)
MCHC RBC AUTO-ENTMCNC: 28.1 G/DL (ref 31.4–37.4)
MCV RBC AUTO: 75 FL (ref 82–98)
MONOCYTES # BLD AUTO: 0.47 THOUSAND/ΜL (ref 0.17–1.22)
MONOCYTES NFR BLD AUTO: 9 % (ref 4–12)
NEUTROPHILS # BLD AUTO: 3.11 THOUSANDS/ΜL (ref 1.85–7.62)
NEUTS SEG NFR BLD AUTO: 64 % (ref 43–75)
NRBC BLD AUTO-RTO: 0 /100 WBCS
PLATELET # BLD AUTO: 216 THOUSANDS/UL (ref 149–390)
PMV BLD AUTO: 9.9 FL (ref 8.9–12.7)
POTASSIUM SERPL-SCNC: 4.1 MMOL/L (ref 3.5–5.3)
RBC # BLD AUTO: 3.73 MILLION/UL (ref 3.81–5.12)
SODIUM SERPL-SCNC: 135 MMOL/L (ref 136–145)
WBC # BLD AUTO: 4.98 THOUSAND/UL (ref 4.31–10.16)

## 2020-05-19 PROCEDURE — 99232 SBSQ HOSP IP/OBS MODERATE 35: CPT | Performed by: HOSPITALIST

## 2020-05-19 PROCEDURE — 99231 SBSQ HOSP IP/OBS SF/LOW 25: CPT | Performed by: PHYSICIAN ASSISTANT

## 2020-05-19 PROCEDURE — 85025 COMPLETE CBC W/AUTO DIFF WBC: CPT | Performed by: HOSPITALIST

## 2020-05-19 PROCEDURE — 80048 BASIC METABOLIC PNL TOTAL CA: CPT | Performed by: HOSPITALIST

## 2020-05-19 PROCEDURE — 83735 ASSAY OF MAGNESIUM: CPT | Performed by: HOSPITALIST

## 2020-05-19 RX ORDER — KETOROLAC TROMETHAMINE 30 MG/ML
15 INJECTION, SOLUTION INTRAMUSCULAR; INTRAVENOUS EVERY 8 HOURS PRN
Status: DISCONTINUED | OUTPATIENT
Start: 2020-05-19 | End: 2020-05-20 | Stop reason: HOSPADM

## 2020-05-19 RX ADMIN — CLINDAMYCIN IN 5 PERCENT DEXTROSE 600 MG: 12 INJECTION, SOLUTION INTRAVENOUS at 17:33

## 2020-05-19 RX ADMIN — NICOTINE 1 PATCH: 14 PATCH TRANSDERMAL at 08:57

## 2020-05-19 RX ADMIN — FLUTICASONE PROPIONATE 2 SPRAY: 50 SPRAY, METERED NASAL at 08:55

## 2020-05-19 RX ADMIN — CHLORHEXIDINE GLUCONATE 0.12% ORAL RINSE 15 ML: 1.2 LIQUID ORAL at 21:00

## 2020-05-19 RX ADMIN — FLUTICASONE PROPIONATE 2 SPRAY: 50 SPRAY, METERED NASAL at 17:33

## 2020-05-19 RX ADMIN — CLINDAMYCIN IN 5 PERCENT DEXTROSE 600 MG: 12 INJECTION, SOLUTION INTRAVENOUS at 10:45

## 2020-05-19 RX ADMIN — BUDESONIDE AND FORMOTEROL FUMARATE DIHYDRATE 2 PUFF: 80; 4.5 AEROSOL RESPIRATORY (INHALATION) at 20:59

## 2020-05-19 RX ADMIN — HYDROMORPHONE HYDROCHLORIDE 0.5 MG: 1 INJECTION, SOLUTION INTRAMUSCULAR; INTRAVENOUS; SUBCUTANEOUS at 09:06

## 2020-05-19 RX ADMIN — HYDROMORPHONE HYDROCHLORIDE 0.5 MG: 1 INJECTION, SOLUTION INTRAMUSCULAR; INTRAVENOUS; SUBCUTANEOUS at 21:08

## 2020-05-19 RX ADMIN — KETOROLAC TROMETHAMINE 15 MG: 30 INJECTION, SOLUTION INTRAMUSCULAR at 17:31

## 2020-05-19 RX ADMIN — LORATADINE 10 MG: 10 TABLET ORAL at 08:55

## 2020-05-19 RX ADMIN — CHLORHEXIDINE GLUCONATE 0.12% ORAL RINSE 15 ML: 1.2 LIQUID ORAL at 17:33

## 2020-05-19 RX ADMIN — TRAZODONE HYDROCHLORIDE 300 MG: 100 TABLET ORAL at 23:25

## 2020-05-19 RX ADMIN — ENOXAPARIN SODIUM 40 MG: 40 INJECTION SUBCUTANEOUS at 08:55

## 2020-05-19 RX ADMIN — OXYCODONE HYDROCHLORIDE 5 MG: 5 TABLET ORAL at 11:51

## 2020-05-19 RX ADMIN — CHLORHEXIDINE GLUCONATE 0.12% ORAL RINSE 15 ML: 1.2 LIQUID ORAL at 08:56

## 2020-05-19 RX ADMIN — PANTOPRAZOLE SODIUM 20 MG: 20 TABLET, DELAYED RELEASE ORAL at 05:13

## 2020-05-19 RX ADMIN — BUDESONIDE AND FORMOTEROL FUMARATE DIHYDRATE 2 PUFF: 80; 4.5 AEROSOL RESPIRATORY (INHALATION) at 08:55

## 2020-05-19 RX ADMIN — METHYLPREDNISOLONE SODIUM SUCCINATE 40 MG: 40 INJECTION, POWDER, FOR SOLUTION INTRAMUSCULAR; INTRAVENOUS at 08:56

## 2020-05-19 RX ADMIN — CLINDAMYCIN IN 5 PERCENT DEXTROSE 600 MG: 12 INJECTION, SOLUTION INTRAVENOUS at 02:05

## 2020-05-20 VITALS
SYSTOLIC BLOOD PRESSURE: 105 MMHG | OXYGEN SATURATION: 95 % | HEART RATE: 88 BPM | RESPIRATION RATE: 18 BRPM | TEMPERATURE: 97.5 F | DIASTOLIC BLOOD PRESSURE: 55 MMHG

## 2020-05-20 LAB
ANION GAP SERPL CALCULATED.3IONS-SCNC: 7 MMOL/L (ref 4–13)
BASOPHILS # BLD AUTO: 0.03 THOUSANDS/ΜL (ref 0–0.1)
BASOPHILS NFR BLD AUTO: 1 % (ref 0–1)
BUN SERPL-MCNC: 19 MG/DL (ref 5–25)
CALCIUM SERPL-MCNC: 8.1 MG/DL (ref 8.3–10.1)
CHLORIDE SERPL-SCNC: 105 MMOL/L (ref 100–108)
CO2 SERPL-SCNC: 26 MMOL/L (ref 21–32)
CREAT SERPL-MCNC: 0.76 MG/DL (ref 0.6–1.3)
EOSINOPHIL # BLD AUTO: 0.05 THOUSAND/ΜL (ref 0–0.61)
EOSINOPHIL NFR BLD AUTO: 1 % (ref 0–6)
ERYTHROCYTE [DISTWIDTH] IN BLOOD BY AUTOMATED COUNT: 19.7 % (ref 11.6–15.1)
GFR SERPL CREATININE-BSD FRML MDRD: 89 ML/MIN/1.73SQ M
GLUCOSE SERPL-MCNC: 80 MG/DL (ref 65–140)
HCT VFR BLD AUTO: 28.1 % (ref 34.8–46.1)
HGB BLD-MCNC: 7.8 G/DL (ref 11.5–15.4)
IMM GRANULOCYTES # BLD AUTO: 0.02 THOUSAND/UL (ref 0–0.2)
IMM GRANULOCYTES NFR BLD AUTO: 0 % (ref 0–2)
LYMPHOCYTES # BLD AUTO: 1.77 THOUSANDS/ΜL (ref 0.6–4.47)
LYMPHOCYTES NFR BLD AUTO: 38 % (ref 14–44)
MCH RBC QN AUTO: 20.7 PG (ref 26.8–34.3)
MCHC RBC AUTO-ENTMCNC: 27.8 G/DL (ref 31.4–37.4)
MCV RBC AUTO: 75 FL (ref 82–98)
MONOCYTES # BLD AUTO: 0.4 THOUSAND/ΜL (ref 0.17–1.22)
MONOCYTES NFR BLD AUTO: 9 % (ref 4–12)
NEUTROPHILS # BLD AUTO: 2.45 THOUSANDS/ΜL (ref 1.85–7.62)
NEUTS SEG NFR BLD AUTO: 51 % (ref 43–75)
NRBC BLD AUTO-RTO: 0 /100 WBCS
PLATELET # BLD AUTO: 247 THOUSANDS/UL (ref 149–390)
PMV BLD AUTO: 9.9 FL (ref 8.9–12.7)
POTASSIUM SERPL-SCNC: 4.1 MMOL/L (ref 3.5–5.3)
RBC # BLD AUTO: 3.76 MILLION/UL (ref 3.81–5.12)
SODIUM SERPL-SCNC: 138 MMOL/L (ref 136–145)
WBC # BLD AUTO: 4.72 THOUSAND/UL (ref 4.31–10.16)

## 2020-05-20 PROCEDURE — 85025 COMPLETE CBC W/AUTO DIFF WBC: CPT | Performed by: HOSPITALIST

## 2020-05-20 PROCEDURE — 80048 BASIC METABOLIC PNL TOTAL CA: CPT | Performed by: HOSPITALIST

## 2020-05-20 PROCEDURE — 99239 HOSP IP/OBS DSCHRG MGMT >30: CPT | Performed by: HOSPITALIST

## 2020-05-20 RX ORDER — OXYCODONE HYDROCHLORIDE 5 MG/1
5 TABLET ORAL EVERY 4 HOURS PRN
Qty: 30 TABLET | Refills: 0 | Status: SHIPPED | OUTPATIENT
Start: 2020-05-20 | End: 2020-05-30

## 2020-05-20 RX ORDER — PREDNISONE 10 MG/1
TABLET ORAL
Qty: 42 TABLET | Refills: 0 | Status: ON HOLD | OUTPATIENT
Start: 2020-05-20 | End: 2020-06-12 | Stop reason: ALTCHOICE

## 2020-05-20 RX ORDER — CLINDAMYCIN HYDROCHLORIDE 300 MG/1
300 CAPSULE ORAL 4 TIMES DAILY
Qty: 28 CAPSULE | Refills: 0 | Status: SHIPPED | OUTPATIENT
Start: 2020-05-20 | End: 2020-05-27

## 2020-05-20 RX ADMIN — BUDESONIDE AND FORMOTEROL FUMARATE DIHYDRATE 2 PUFF: 80; 4.5 AEROSOL RESPIRATORY (INHALATION) at 08:30

## 2020-05-20 RX ADMIN — METHYLPREDNISOLONE SODIUM SUCCINATE 40 MG: 40 INJECTION, POWDER, FOR SOLUTION INTRAMUSCULAR; INTRAVENOUS at 08:32

## 2020-05-20 RX ADMIN — LORATADINE 10 MG: 10 TABLET ORAL at 08:31

## 2020-05-20 RX ADMIN — CLINDAMYCIN IN 5 PERCENT DEXTROSE 600 MG: 12 INJECTION, SOLUTION INTRAVENOUS at 01:50

## 2020-05-20 RX ADMIN — CLINDAMYCIN IN 5 PERCENT DEXTROSE 600 MG: 12 INJECTION, SOLUTION INTRAVENOUS at 10:31

## 2020-05-20 RX ADMIN — ENOXAPARIN SODIUM 40 MG: 40 INJECTION SUBCUTANEOUS at 08:31

## 2020-05-20 RX ADMIN — PANTOPRAZOLE SODIUM 20 MG: 20 TABLET, DELAYED RELEASE ORAL at 05:36

## 2020-05-20 RX ADMIN — NICOTINE 1 PATCH: 14 PATCH TRANSDERMAL at 08:32

## 2020-05-20 RX ADMIN — FLUTICASONE PROPIONATE 2 SPRAY: 50 SPRAY, METERED NASAL at 08:30

## 2020-05-20 RX ADMIN — KETOROLAC TROMETHAMINE 15 MG: 30 INJECTION, SOLUTION INTRAMUSCULAR at 10:30

## 2020-05-20 RX ADMIN — OXYCODONE HYDROCHLORIDE 5 MG: 5 TABLET ORAL at 14:32

## 2020-05-20 RX ADMIN — CHLORHEXIDINE GLUCONATE 0.12% ORAL RINSE 15 ML: 1.2 LIQUID ORAL at 08:31

## 2020-05-22 LAB
BACTERIA BLD CULT: NORMAL
BACTERIA BLD CULT: NORMAL

## 2020-06-06 DIAGNOSIS — K11.20 SIALADENITIS: ICD-10-CM

## 2020-06-06 DIAGNOSIS — Z01.812 PRE-OPERATIVE LABORATORY EXAMINATION: ICD-10-CM

## 2020-06-06 DIAGNOSIS — K11.5 SIALOLITHIASIS OF SUBMANDIBULAR GLAND: ICD-10-CM

## 2020-06-06 DIAGNOSIS — Z01.818 PREOPERATIVE TESTING: ICD-10-CM

## 2020-06-06 PROCEDURE — U0003 INFECTIOUS AGENT DETECTION BY NUCLEIC ACID (DNA OR RNA); SEVERE ACUTE RESPIRATORY SYNDROME CORONAVIRUS 2 (SARS-COV-2) (CORONAVIRUS DISEASE [COVID-19]), AMPLIFIED PROBE TECHNIQUE, MAKING USE OF HIGH THROUGHPUT TECHNOLOGIES AS DESCRIBED BY CMS-2020-01-R: HCPCS

## 2020-06-09 LAB — SARS-COV-2 RNA SPEC QL NAA+PROBE: NOT DETECTED

## 2020-06-10 ENCOUNTER — APPOINTMENT (OUTPATIENT)
Dept: LAB | Facility: CLINIC | Age: 55
End: 2020-06-10
Payer: COMMERCIAL

## 2020-06-10 ENCOUNTER — APPOINTMENT (OUTPATIENT)
Dept: RADIOLOGY | Facility: CLINIC | Age: 55
End: 2020-06-10
Payer: COMMERCIAL

## 2020-06-10 DIAGNOSIS — K11.20 SIALADENITIS: ICD-10-CM

## 2020-06-10 DIAGNOSIS — Z01.818 PREOPERATIVE TESTING: ICD-10-CM

## 2020-06-10 DIAGNOSIS — K11.5 SIALOLITHIASIS OF SUBMANDIBULAR GLAND: ICD-10-CM

## 2020-06-10 DIAGNOSIS — Z01.812 PRE-OPERATIVE LABORATORY EXAMINATION: ICD-10-CM

## 2020-06-10 LAB
APTT PPP: 25 SECONDS (ref 23–37)
BASOPHILS # BLD AUTO: 0.05 THOUSANDS/ΜL (ref 0–0.1)
BASOPHILS NFR BLD AUTO: 1 % (ref 0–1)
EOSINOPHIL # BLD AUTO: 0.19 THOUSAND/ΜL (ref 0–0.61)
EOSINOPHIL NFR BLD AUTO: 5 % (ref 0–6)
ERYTHROCYTE [DISTWIDTH] IN BLOOD BY AUTOMATED COUNT: 21.5 % (ref 11.6–15.1)
HCT VFR BLD AUTO: 33.2 % (ref 34.8–46.1)
HGB BLD-MCNC: 9.2 G/DL (ref 11.5–15.4)
IMM GRANULOCYTES # BLD AUTO: 0 THOUSAND/UL (ref 0–0.2)
IMM GRANULOCYTES NFR BLD AUTO: 0 % (ref 0–2)
INR PPP: 1 (ref 0.84–1.19)
LYMPHOCYTES # BLD AUTO: 1.2 THOUSANDS/ΜL (ref 0.6–4.47)
LYMPHOCYTES NFR BLD AUTO: 30 % (ref 14–44)
MCH RBC QN AUTO: 20.9 PG (ref 26.8–34.3)
MCHC RBC AUTO-ENTMCNC: 27.7 G/DL (ref 31.4–37.4)
MCV RBC AUTO: 75 FL (ref 82–98)
MONOCYTES # BLD AUTO: 0.38 THOUSAND/ΜL (ref 0.17–1.22)
MONOCYTES NFR BLD AUTO: 10 % (ref 4–12)
NEUTROPHILS # BLD AUTO: 2.17 THOUSANDS/ΜL (ref 1.85–7.62)
NEUTS SEG NFR BLD AUTO: 54 % (ref 43–75)
NRBC BLD AUTO-RTO: 0 /100 WBCS
PLATELET # BLD AUTO: 264 THOUSANDS/UL (ref 149–390)
PMV BLD AUTO: 10.7 FL (ref 8.9–12.7)
PROTHROMBIN TIME: 12.8 SECONDS (ref 11.6–14.5)
RBC # BLD AUTO: 4.41 MILLION/UL (ref 3.81–5.12)
WBC # BLD AUTO: 3.99 THOUSAND/UL (ref 4.31–10.16)

## 2020-06-10 PROCEDURE — 36415 COLL VENOUS BLD VENIPUNCTURE: CPT

## 2020-06-10 PROCEDURE — 71046 X-RAY EXAM CHEST 2 VIEWS: CPT

## 2020-06-10 PROCEDURE — 85610 PROTHROMBIN TIME: CPT

## 2020-06-10 PROCEDURE — 85025 COMPLETE CBC W/AUTO DIFF WBC: CPT

## 2020-06-10 PROCEDURE — 85730 THROMBOPLASTIN TIME PARTIAL: CPT

## 2020-06-11 ENCOUNTER — ANESTHESIA EVENT (OUTPATIENT)
Dept: PERIOP | Facility: HOSPITAL | Age: 55
End: 2020-06-11
Payer: COMMERCIAL

## 2020-06-12 ENCOUNTER — HOSPITAL ENCOUNTER (OUTPATIENT)
Facility: HOSPITAL | Age: 55
Setting detail: OUTPATIENT SURGERY
Discharge: HOME | End: 2020-06-12
Attending: OTOLARYNGOLOGY | Admitting: OTOLARYNGOLOGY
Payer: COMMERCIAL

## 2020-06-12 ENCOUNTER — ANESTHESIA (OUTPATIENT)
Dept: PERIOP | Facility: HOSPITAL | Age: 55
End: 2020-06-12
Payer: COMMERCIAL

## 2020-06-12 VITALS
OXYGEN SATURATION: 98 % | WEIGHT: 284 LBS | BODY MASS INDEX: 44.57 KG/M2 | TEMPERATURE: 98 F | HEART RATE: 61 BPM | SYSTOLIC BLOOD PRESSURE: 127 MMHG | RESPIRATION RATE: 18 BRPM | HEIGHT: 67 IN | DIASTOLIC BLOOD PRESSURE: 64 MMHG

## 2020-06-12 DIAGNOSIS — Z01.812 PRE-OPERATIVE LABORATORY EXAMINATION: ICD-10-CM

## 2020-06-12 DIAGNOSIS — K11.5 SIALOLITHIASIS OF SUBMANDIBULAR GLAND: ICD-10-CM

## 2020-06-12 DIAGNOSIS — Z01.818 PREOPERATIVE TESTING: ICD-10-CM

## 2020-06-12 DIAGNOSIS — K11.20 SIALADENITIS: ICD-10-CM

## 2020-06-12 LAB
EXT PREGNANCY TEST URINE: NEGATIVE
EXT. CONTROL: NORMAL

## 2020-06-12 PROCEDURE — 88341 IMHCHEM/IMCYTCHM EA ADD ANTB: CPT | Performed by: PATHOLOGY

## 2020-06-12 PROCEDURE — 88342 IMHCHEM/IMCYTCHM 1ST ANTB: CPT | Performed by: PATHOLOGY

## 2020-06-12 PROCEDURE — 81025 URINE PREGNANCY TEST: CPT | Performed by: OTOLARYNGOLOGY

## 2020-06-12 PROCEDURE — 88307 TISSUE EXAM BY PATHOLOGIST: CPT | Performed by: PATHOLOGY

## 2020-06-12 PROCEDURE — 42440 EXCISE SUBMAXILLARY GLAND: CPT | Performed by: OTOLARYNGOLOGY

## 2020-06-12 RX ORDER — MIDAZOLAM HYDROCHLORIDE 2 MG/2ML
INJECTION, SOLUTION INTRAMUSCULAR; INTRAVENOUS AS NEEDED
Status: DISCONTINUED | OUTPATIENT
Start: 2020-06-12 | End: 2020-06-12 | Stop reason: SURG

## 2020-06-12 RX ORDER — FENTANYL CITRATE/PF 50 MCG/ML
25 SYRINGE (ML) INJECTION
Status: DISCONTINUED | OUTPATIENT
Start: 2020-06-12 | End: 2020-06-12 | Stop reason: HOSPADM

## 2020-06-12 RX ORDER — ONDANSETRON 2 MG/ML
4 INJECTION INTRAMUSCULAR; INTRAVENOUS ONCE AS NEEDED
Status: DISCONTINUED | OUTPATIENT
Start: 2020-06-12 | End: 2020-06-12 | Stop reason: HOSPADM

## 2020-06-12 RX ORDER — MAGNESIUM HYDROXIDE 1200 MG/15ML
LIQUID ORAL AS NEEDED
Status: DISCONTINUED | OUTPATIENT
Start: 2020-06-12 | End: 2020-06-12 | Stop reason: HOSPADM

## 2020-06-12 RX ORDER — PROPOFOL 10 MG/ML
INJECTION, EMULSION INTRAVENOUS AS NEEDED
Status: DISCONTINUED | OUTPATIENT
Start: 2020-06-12 | End: 2020-06-12 | Stop reason: SURG

## 2020-06-12 RX ORDER — DEXAMETHASONE SODIUM PHOSPHATE 4 MG/ML
INJECTION, SOLUTION INTRA-ARTICULAR; INTRALESIONAL; INTRAMUSCULAR; INTRAVENOUS; SOFT TISSUE AS NEEDED
Status: DISCONTINUED | OUTPATIENT
Start: 2020-06-12 | End: 2020-06-12 | Stop reason: SURG

## 2020-06-12 RX ORDER — ROCURONIUM BROMIDE 10 MG/ML
INJECTION, SOLUTION INTRAVENOUS AS NEEDED
Status: DISCONTINUED | OUTPATIENT
Start: 2020-06-12 | End: 2020-06-12 | Stop reason: SURG

## 2020-06-12 RX ORDER — LIDOCAINE HYDROCHLORIDE AND EPINEPHRINE 10; 10 MG/ML; UG/ML
INJECTION, SOLUTION INFILTRATION; PERINEURAL AS NEEDED
Status: DISCONTINUED | OUTPATIENT
Start: 2020-06-12 | End: 2020-06-12 | Stop reason: HOSPADM

## 2020-06-12 RX ORDER — ALBUTEROL SULFATE 2.5 MG/3ML
2.5 SOLUTION RESPIRATORY (INHALATION) ONCE
Status: COMPLETED | OUTPATIENT
Start: 2020-06-12 | End: 2020-06-12

## 2020-06-12 RX ORDER — LIDOCAINE HYDROCHLORIDE 20 MG/ML
INJECTION, SOLUTION EPIDURAL; INFILTRATION; INTRACAUDAL; PERINEURAL AS NEEDED
Status: DISCONTINUED | OUTPATIENT
Start: 2020-06-12 | End: 2020-06-12 | Stop reason: SURG

## 2020-06-12 RX ORDER — SODIUM CHLORIDE 9 MG/ML
125 INJECTION, SOLUTION INTRAVENOUS CONTINUOUS
Status: DISCONTINUED | OUTPATIENT
Start: 2020-06-12 | End: 2020-06-12 | Stop reason: HOSPADM

## 2020-06-12 RX ORDER — CLINDAMYCIN PHOSPHATE 900 MG/50ML
900 INJECTION INTRAVENOUS ONCE
Status: COMPLETED | OUTPATIENT
Start: 2020-06-12 | End: 2020-06-12

## 2020-06-12 RX ORDER — NEOSTIGMINE METHYLSULFATE 1 MG/ML
INJECTION INTRAVENOUS AS NEEDED
Status: DISCONTINUED | OUTPATIENT
Start: 2020-06-12 | End: 2020-06-12 | Stop reason: SURG

## 2020-06-12 RX ORDER — ACETAMINOPHEN 325 MG/1
650 TABLET ORAL EVERY 4 HOURS PRN
Status: DISCONTINUED | OUTPATIENT
Start: 2020-06-12 | End: 2020-06-12 | Stop reason: HOSPADM

## 2020-06-12 RX ORDER — FENTANYL CITRATE 50 UG/ML
INJECTION, SOLUTION INTRAMUSCULAR; INTRAVENOUS AS NEEDED
Status: DISCONTINUED | OUTPATIENT
Start: 2020-06-12 | End: 2020-06-12 | Stop reason: SURG

## 2020-06-12 RX ORDER — OXYCODONE HYDROCHLORIDE AND ACETAMINOPHEN 5; 325 MG/1; MG/1
2 TABLET ORAL EVERY 4 HOURS PRN
Status: DISCONTINUED | OUTPATIENT
Start: 2020-06-12 | End: 2020-06-12 | Stop reason: HOSPADM

## 2020-06-12 RX ORDER — ONDANSETRON 2 MG/ML
4 INJECTION INTRAMUSCULAR; INTRAVENOUS EVERY 6 HOURS PRN
Status: DISCONTINUED | OUTPATIENT
Start: 2020-06-12 | End: 2020-06-12 | Stop reason: HOSPADM

## 2020-06-12 RX ORDER — ONDANSETRON 2 MG/ML
INJECTION INTRAMUSCULAR; INTRAVENOUS AS NEEDED
Status: DISCONTINUED | OUTPATIENT
Start: 2020-06-12 | End: 2020-06-12 | Stop reason: SURG

## 2020-06-12 RX ORDER — HYDROMORPHONE HCL/PF 1 MG/ML
SYRINGE (ML) INJECTION AS NEEDED
Status: DISCONTINUED | OUTPATIENT
Start: 2020-06-12 | End: 2020-06-12 | Stop reason: SURG

## 2020-06-12 RX ORDER — GLYCOPYRROLATE 0.2 MG/ML
INJECTION INTRAMUSCULAR; INTRAVENOUS AS NEEDED
Status: DISCONTINUED | OUTPATIENT
Start: 2020-06-12 | End: 2020-06-12 | Stop reason: SURG

## 2020-06-12 RX ADMIN — FENTANYL CITRATE 100 MCG: 50 INJECTION, SOLUTION INTRAMUSCULAR; INTRAVENOUS at 07:43

## 2020-06-12 RX ADMIN — ALBUTEROL SULFATE 2.5 MG: 2.5 SOLUTION RESPIRATORY (INHALATION) at 09:35

## 2020-06-12 RX ADMIN — OXYCODONE HYDROCHLORIDE AND ACETAMINOPHEN 2 TABLET: 5; 325 TABLET ORAL at 12:04

## 2020-06-12 RX ADMIN — ALBUTEROL SULFATE 2.5 MG: 2.5 SOLUTION RESPIRATORY (INHALATION) at 07:00

## 2020-06-12 RX ADMIN — DEXAMETHASONE SODIUM PHOSPHATE 4 MG: 4 INJECTION, SOLUTION INTRAMUSCULAR; INTRAVENOUS at 07:35

## 2020-06-12 RX ADMIN — CLINDAMYCIN PHOSPHATE 900 MG: 18 INJECTION, SOLUTION INTRAMUSCULAR; INTRAVENOUS at 07:14

## 2020-06-12 RX ADMIN — MIDAZOLAM 4 MG: 1 INJECTION INTRAMUSCULAR; INTRAVENOUS at 07:26

## 2020-06-12 RX ADMIN — ONDANSETRON 4 MG: 2 INJECTION INTRAMUSCULAR; INTRAVENOUS at 07:35

## 2020-06-12 RX ADMIN — SODIUM CHLORIDE: 0.9 INJECTION, SOLUTION INTRAVENOUS at 09:02

## 2020-06-12 RX ADMIN — NEOSTIGMINE METHYLSULFATE 3 MG: 1 INJECTION, SOLUTION INTRAVENOUS at 09:03

## 2020-06-12 RX ADMIN — FENTANYL CITRATE 25 MCG: 50 INJECTION INTRAMUSCULAR; INTRAVENOUS at 09:59

## 2020-06-12 RX ADMIN — FENTANYL CITRATE 100 MCG: 50 INJECTION, SOLUTION INTRAMUSCULAR; INTRAVENOUS at 07:30

## 2020-06-12 RX ADMIN — SODIUM CHLORIDE 125 ML/HR: 0.9 INJECTION, SOLUTION INTRAVENOUS at 05:53

## 2020-06-12 RX ADMIN — HYDROMORPHONE HYDROCHLORIDE 0.5 MG: 1 INJECTION, SOLUTION INTRAMUSCULAR; INTRAVENOUS; SUBCUTANEOUS at 07:57

## 2020-06-12 RX ADMIN — LIDOCAINE HYDROCHLORIDE 100 MG: 20 INJECTION, SOLUTION EPIDURAL; INFILTRATION; INTRACAUDAL; PERINEURAL at 07:30

## 2020-06-12 RX ADMIN — FENTANYL CITRATE 25 MCG: 50 INJECTION INTRAMUSCULAR; INTRAVENOUS at 09:20

## 2020-06-12 RX ADMIN — PROPOFOL 200 MG: 10 INJECTION, EMULSION INTRAVENOUS at 07:30

## 2020-06-12 RX ADMIN — ROCURONIUM BROMIDE 50 MG: 10 INJECTION, SOLUTION INTRAVENOUS at 07:30

## 2020-06-12 RX ADMIN — HYDROMORPHONE HYDROCHLORIDE 0.5 MG: 1 INJECTION, SOLUTION INTRAMUSCULAR; INTRAVENOUS; SUBCUTANEOUS at 08:21

## 2020-06-12 RX ADMIN — GLYCOPYRROLATE 0.6 MG: 0.2 INJECTION, SOLUTION INTRAMUSCULAR; INTRAVENOUS at 09:03

## 2020-06-12 RX ADMIN — SODIUM CHLORIDE: 0.9 INJECTION, SOLUTION INTRAVENOUS at 07:55

## 2020-06-12 RX ADMIN — FENTANYL CITRATE 25 MCG: 50 INJECTION INTRAMUSCULAR; INTRAVENOUS at 09:26

## 2020-06-16 ENCOUNTER — HOSPITAL ENCOUNTER (INPATIENT)
Facility: HOSPITAL | Age: 55
LOS: 4 days | Discharge: HOME | DRG: 857 | End: 2020-06-20
Attending: EMERGENCY MEDICINE | Admitting: STUDENT IN AN ORGANIZED HEALTH CARE EDUCATION/TRAINING PROGRAM
Payer: COMMERCIAL

## 2020-06-16 ENCOUNTER — ANESTHESIA EVENT (EMERGENCY)
Dept: PERIOP | Facility: HOSPITAL | Age: 55
DRG: 857 | End: 2020-06-16
Payer: COMMERCIAL

## 2020-06-16 ENCOUNTER — ANESTHESIA (EMERGENCY)
Dept: PERIOP | Facility: HOSPITAL | Age: 55
DRG: 857 | End: 2020-06-16
Payer: COMMERCIAL

## 2020-06-16 DIAGNOSIS — L02.11 ABSCESS, NECK: ICD-10-CM

## 2020-06-16 DIAGNOSIS — L03.211 FACIAL CELLULITIS: Primary | ICD-10-CM

## 2020-06-16 DIAGNOSIS — K11.5 SIALOLITHIASIS OF SUBMANDIBULAR GLAND: ICD-10-CM

## 2020-06-16 LAB
ALBUMIN SERPL BCP-MCNC: 3.6 G/DL (ref 3.5–5)
ALP SERPL-CCNC: 129 U/L (ref 46–116)
ALT SERPL W P-5'-P-CCNC: 17 U/L (ref 12–78)
ANION GAP SERPL CALCULATED.3IONS-SCNC: 9 MMOL/L (ref 4–13)
AST SERPL W P-5'-P-CCNC: 22 U/L (ref 5–45)
BASOPHILS # BLD AUTO: 0.03 THOUSANDS/ΜL (ref 0–0.1)
BASOPHILS NFR BLD AUTO: 0 % (ref 0–1)
BILIRUB SERPL-MCNC: 0.9 MG/DL (ref 0.2–1)
BUN SERPL-MCNC: 13 MG/DL (ref 5–25)
CALCIUM SERPL-MCNC: 8.8 MG/DL (ref 8.3–10.1)
CHLORIDE SERPL-SCNC: 101 MMOL/L (ref 100–108)
CO2 SERPL-SCNC: 27 MMOL/L (ref 21–32)
CREAT SERPL-MCNC: 0.65 MG/DL (ref 0.6–1.3)
EOSINOPHIL # BLD AUTO: 0.24 THOUSAND/ΜL (ref 0–0.61)
EOSINOPHIL NFR BLD AUTO: 3 % (ref 0–6)
ERYTHROCYTE [DISTWIDTH] IN BLOOD BY AUTOMATED COUNT: 20.9 % (ref 11.6–15.1)
GFR SERPL CREATININE-BSD FRML MDRD: 101 ML/MIN/1.73SQ M
GLUCOSE SERPL-MCNC: 93 MG/DL (ref 65–140)
HCT VFR BLD AUTO: 31.6 % (ref 34.8–46.1)
HGB BLD-MCNC: 9 G/DL (ref 11.5–15.4)
IMM GRANULOCYTES # BLD AUTO: 0.01 THOUSAND/UL (ref 0–0.2)
IMM GRANULOCYTES NFR BLD AUTO: 0 % (ref 0–2)
LYMPHOCYTES # BLD AUTO: 1.15 THOUSANDS/ΜL (ref 0.6–4.47)
LYMPHOCYTES NFR BLD AUTO: 16 % (ref 14–44)
MCH RBC QN AUTO: 21.1 PG (ref 26.8–34.3)
MCHC RBC AUTO-ENTMCNC: 28.5 G/DL (ref 31.4–37.4)
MCV RBC AUTO: 74 FL (ref 82–98)
MONOCYTES # BLD AUTO: 0.62 THOUSAND/ΜL (ref 0.17–1.22)
MONOCYTES NFR BLD AUTO: 9 % (ref 4–12)
NEUTROPHILS # BLD AUTO: 5.12 THOUSANDS/ΜL (ref 1.85–7.62)
NEUTS SEG NFR BLD AUTO: 72 % (ref 43–75)
NRBC BLD AUTO-RTO: 0 /100 WBCS
PLATELET # BLD AUTO: 271 THOUSANDS/UL (ref 149–390)
PMV BLD AUTO: 10.4 FL (ref 8.9–12.7)
POTASSIUM SERPL-SCNC: 3.7 MMOL/L (ref 3.5–5.3)
PROT SERPL-MCNC: 7.8 G/DL (ref 6.4–8.2)
RBC # BLD AUTO: 4.26 MILLION/UL (ref 3.81–5.12)
SODIUM SERPL-SCNC: 137 MMOL/L (ref 136–145)
WBC # BLD AUTO: 7.17 THOUSAND/UL (ref 4.31–10.16)

## 2020-06-16 PROCEDURE — 80053 COMPREHEN METABOLIC PANEL: CPT | Performed by: PHYSICIAN ASSISTANT

## 2020-06-16 PROCEDURE — 87205 SMEAR GRAM STAIN: CPT | Performed by: OTOLARYNGOLOGY

## 2020-06-16 PROCEDURE — 87040 BLOOD CULTURE FOR BACTERIA: CPT | Performed by: PHYSICIAN ASSISTANT

## 2020-06-16 PROCEDURE — 36415 COLL VENOUS BLD VENIPUNCTURE: CPT | Performed by: PHYSICIAN ASSISTANT

## 2020-06-16 PROCEDURE — 0J940ZZ DRAINAGE OF RIGHT NECK SUBCUTANEOUS TISSUE AND FASCIA, OPEN APPROACH: ICD-10-PCS | Performed by: OTOLARYNGOLOGY

## 2020-06-16 PROCEDURE — 85025 COMPLETE CBC W/AUTO DIFF WBC: CPT | Performed by: PHYSICIAN ASSISTANT

## 2020-06-16 PROCEDURE — 87070 CULTURE OTHR SPECIMN AEROBIC: CPT | Performed by: OTOLARYNGOLOGY

## 2020-06-16 PROCEDURE — 99285 EMERGENCY DEPT VISIT HI MDM: CPT | Performed by: PHYSICIAN ASSISTANT

## 2020-06-16 PROCEDURE — 99223 1ST HOSP IP/OBS HIGH 75: CPT | Performed by: PHYSICIAN ASSISTANT

## 2020-06-16 PROCEDURE — 21501 I&D DP ABSC/HMTMA SFT TS NCK: CPT | Performed by: OTOLARYNGOLOGY

## 2020-06-16 PROCEDURE — 96365 THER/PROPH/DIAG IV INF INIT: CPT

## 2020-06-16 PROCEDURE — 99284 EMERGENCY DEPT VISIT MOD MDM: CPT

## 2020-06-16 RX ORDER — CLINDAMYCIN PHOSPHATE 600 MG/50ML
600 INJECTION INTRAVENOUS ONCE
Status: COMPLETED | OUTPATIENT
Start: 2020-06-16 | End: 2020-06-16

## 2020-06-16 RX ORDER — ONDANSETRON 2 MG/ML
4 INJECTION INTRAMUSCULAR; INTRAVENOUS EVERY 6 HOURS PRN
Status: DISCONTINUED | OUTPATIENT
Start: 2020-06-16 | End: 2020-06-20 | Stop reason: HOSPADM

## 2020-06-16 RX ORDER — CLINDAMYCIN PHOSPHATE 600 MG/50ML
600 INJECTION INTRAVENOUS EVERY 8 HOURS
Status: DISCONTINUED | OUTPATIENT
Start: 2020-06-17 | End: 2020-06-18

## 2020-06-16 RX ORDER — ACETAMINOPHEN 325 MG/1
650 TABLET ORAL EVERY 6 HOURS PRN
Status: DISCONTINUED | OUTPATIENT
Start: 2020-06-16 | End: 2020-06-20 | Stop reason: HOSPADM

## 2020-06-16 RX ORDER — TRAZODONE HYDROCHLORIDE 100 MG/1
300 TABLET ORAL
Status: DISCONTINUED | OUTPATIENT
Start: 2020-06-16 | End: 2020-06-20 | Stop reason: HOSPADM

## 2020-06-16 RX ORDER — LIDOCAINE HYDROCHLORIDE AND EPINEPHRINE 10; 10 MG/ML; UG/ML
INJECTION, SOLUTION INFILTRATION; PERINEURAL AS NEEDED
Status: DISCONTINUED | OUTPATIENT
Start: 2020-06-16 | End: 2020-06-16 | Stop reason: HOSPADM

## 2020-06-16 RX ORDER — ALBUTEROL SULFATE 2.5 MG/3ML
2.5 SOLUTION RESPIRATORY (INHALATION) ONCE
Status: DISCONTINUED | OUTPATIENT
Start: 2020-06-16 | End: 2020-06-16

## 2020-06-16 RX ORDER — ONDANSETRON 2 MG/ML
INJECTION INTRAMUSCULAR; INTRAVENOUS AS NEEDED
Status: DISCONTINUED | OUTPATIENT
Start: 2020-06-16 | End: 2020-06-16 | Stop reason: SURG

## 2020-06-16 RX ORDER — SODIUM CHLORIDE 9 MG/ML
125 INJECTION, SOLUTION INTRAVENOUS CONTINUOUS
Status: DISCONTINUED | OUTPATIENT
Start: 2020-06-16 | End: 2020-06-16

## 2020-06-16 RX ORDER — ROCURONIUM BROMIDE 10 MG/ML
INJECTION, SOLUTION INTRAVENOUS AS NEEDED
Status: DISCONTINUED | OUTPATIENT
Start: 2020-06-16 | End: 2020-06-16 | Stop reason: SURG

## 2020-06-16 RX ORDER — FENTANYL CITRATE/PF 50 MCG/ML
25 SYRINGE (ML) INJECTION
Status: COMPLETED | OUTPATIENT
Start: 2020-06-16 | End: 2020-06-16

## 2020-06-16 RX ORDER — HYDROMORPHONE HCL/PF 1 MG/ML
0.5 SYRINGE (ML) INJECTION
Status: DISCONTINUED | OUTPATIENT
Start: 2020-06-16 | End: 2020-06-16 | Stop reason: HOSPADM

## 2020-06-16 RX ORDER — OXYCODONE HYDROCHLORIDE 5 MG/1
5 TABLET ORAL EVERY 4 HOURS PRN
Status: DISCONTINUED | OUTPATIENT
Start: 2020-06-16 | End: 2020-06-17

## 2020-06-16 RX ORDER — CALCIUM CARBONATE 200(500)MG
1000 TABLET,CHEWABLE ORAL 3 TIMES DAILY PRN
Status: DISCONTINUED | OUTPATIENT
Start: 2020-06-16 | End: 2020-06-20 | Stop reason: HOSPADM

## 2020-06-16 RX ORDER — MAGNESIUM HYDROXIDE 1200 MG/15ML
LIQUID ORAL AS NEEDED
Status: DISCONTINUED | OUTPATIENT
Start: 2020-06-16 | End: 2020-06-16 | Stop reason: HOSPADM

## 2020-06-16 RX ORDER — ALBUTEROL SULFATE 90 UG/1
1 AEROSOL, METERED RESPIRATORY (INHALATION) EVERY 6 HOURS PRN
Status: DISCONTINUED | OUTPATIENT
Start: 2020-06-16 | End: 2020-06-20 | Stop reason: HOSPADM

## 2020-06-16 RX ORDER — HYDROMORPHONE HCL/PF 1 MG/ML
0.2 SYRINGE (ML) INJECTION EVERY 4 HOURS PRN
Status: DISCONTINUED | OUTPATIENT
Start: 2020-06-16 | End: 2020-06-18

## 2020-06-16 RX ORDER — HEPARIN SODIUM 5000 [USP'U]/ML
5000 INJECTION, SOLUTION INTRAVENOUS; SUBCUTANEOUS EVERY 8 HOURS SCHEDULED
Status: DISCONTINUED | OUTPATIENT
Start: 2020-06-17 | End: 2020-06-20 | Stop reason: HOSPADM

## 2020-06-16 RX ORDER — SUCCINYLCHOLINE/SOD CL,ISO/PF 100 MG/5ML
SYRINGE (ML) INTRAVENOUS AS NEEDED
Status: DISCONTINUED | OUTPATIENT
Start: 2020-06-16 | End: 2020-06-16 | Stop reason: SURG

## 2020-06-16 RX ORDER — SODIUM CHLORIDE 9 MG/ML
INJECTION, SOLUTION INTRAVENOUS CONTINUOUS PRN
Status: DISCONTINUED | OUTPATIENT
Start: 2020-06-16 | End: 2020-06-16 | Stop reason: SURG

## 2020-06-16 RX ORDER — LIDOCAINE HYDROCHLORIDE 10 MG/ML
INJECTION, SOLUTION EPIDURAL; INFILTRATION; INTRACAUDAL; PERINEURAL AS NEEDED
Status: DISCONTINUED | OUTPATIENT
Start: 2020-06-16 | End: 2020-06-16 | Stop reason: SURG

## 2020-06-16 RX ORDER — GLYCOPYRROLATE 0.2 MG/ML
INJECTION INTRAMUSCULAR; INTRAVENOUS AS NEEDED
Status: DISCONTINUED | OUTPATIENT
Start: 2020-06-16 | End: 2020-06-16 | Stop reason: SURG

## 2020-06-16 RX ORDER — NEOSTIGMINE METHYLSULFATE 1 MG/ML
INJECTION INTRAVENOUS AS NEEDED
Status: DISCONTINUED | OUTPATIENT
Start: 2020-06-16 | End: 2020-06-16 | Stop reason: SURG

## 2020-06-16 RX ORDER — PROPOFOL 10 MG/ML
INJECTION, EMULSION INTRAVENOUS AS NEEDED
Status: DISCONTINUED | OUTPATIENT
Start: 2020-06-16 | End: 2020-06-16 | Stop reason: SURG

## 2020-06-16 RX ORDER — NICOTINE 21 MG/24HR
1 PATCH, TRANSDERMAL 24 HOURS TRANSDERMAL DAILY
Status: DISCONTINUED | OUTPATIENT
Start: 2020-06-17 | End: 2020-06-20 | Stop reason: HOSPADM

## 2020-06-16 RX ORDER — LORATADINE 10 MG/1
10 TABLET ORAL DAILY
Status: DISCONTINUED | OUTPATIENT
Start: 2020-06-17 | End: 2020-06-20 | Stop reason: HOSPADM

## 2020-06-16 RX ORDER — MIDAZOLAM HYDROCHLORIDE 2 MG/2ML
INJECTION, SOLUTION INTRAMUSCULAR; INTRAVENOUS AS NEEDED
Status: DISCONTINUED | OUTPATIENT
Start: 2020-06-16 | End: 2020-06-16 | Stop reason: SURG

## 2020-06-16 RX ORDER — FENTANYL CITRATE 50 UG/ML
INJECTION, SOLUTION INTRAMUSCULAR; INTRAVENOUS AS NEEDED
Status: DISCONTINUED | OUTPATIENT
Start: 2020-06-16 | End: 2020-06-16 | Stop reason: SURG

## 2020-06-16 RX ORDER — PANTOPRAZOLE SODIUM 40 MG/1
40 TABLET, DELAYED RELEASE ORAL
Status: DISCONTINUED | OUTPATIENT
Start: 2020-06-17 | End: 2020-06-20 | Stop reason: HOSPADM

## 2020-06-16 RX ORDER — SODIUM CHLORIDE 9 MG/ML
125 INJECTION, SOLUTION INTRAVENOUS CONTINUOUS
Status: DISCONTINUED | OUTPATIENT
Start: 2020-06-16 | End: 2020-06-18

## 2020-06-16 RX ORDER — ALBUTEROL SULFATE 90 UG/1
AEROSOL, METERED RESPIRATORY (INHALATION) AS NEEDED
Status: DISCONTINUED | OUTPATIENT
Start: 2020-06-16 | End: 2020-06-16 | Stop reason: SURG

## 2020-06-16 RX ORDER — IPRATROPIUM BROMIDE AND ALBUTEROL SULFATE 2.5; .5 MG/3ML; MG/3ML
3 SOLUTION RESPIRATORY (INHALATION) EVERY 6 HOURS PRN
Status: DISCONTINUED | OUTPATIENT
Start: 2020-06-16 | End: 2020-06-20 | Stop reason: HOSPADM

## 2020-06-16 RX ORDER — BUDESONIDE AND FORMOTEROL FUMARATE DIHYDRATE 80; 4.5 UG/1; UG/1
2 AEROSOL RESPIRATORY (INHALATION) 2 TIMES DAILY
Status: DISCONTINUED | OUTPATIENT
Start: 2020-06-16 | End: 2020-06-20 | Stop reason: HOSPADM

## 2020-06-16 RX ORDER — ONDANSETRON 2 MG/ML
4 INJECTION INTRAMUSCULAR; INTRAVENOUS ONCE AS NEEDED
Status: DISCONTINUED | OUTPATIENT
Start: 2020-06-16 | End: 2020-06-16 | Stop reason: HOSPADM

## 2020-06-16 RX ORDER — OXYCODONE HYDROCHLORIDE 10 MG/1
10 TABLET ORAL EVERY 4 HOURS PRN
Status: DISCONTINUED | OUTPATIENT
Start: 2020-06-16 | End: 2020-06-18

## 2020-06-16 RX ORDER — SENNOSIDES 8.6 MG
1 TABLET ORAL
Status: DISCONTINUED | OUTPATIENT
Start: 2020-06-16 | End: 2020-06-20 | Stop reason: HOSPADM

## 2020-06-16 RX ADMIN — FENTANYL CITRATE 25 MCG: 50 INJECTION INTRAMUSCULAR; INTRAVENOUS at 20:47

## 2020-06-16 RX ADMIN — FENTANYL CITRATE 50 MCG: 50 INJECTION, SOLUTION INTRAMUSCULAR; INTRAVENOUS at 19:30

## 2020-06-16 RX ADMIN — LIDOCAINE HYDROCHLORIDE 100 MG: 10 INJECTION, SOLUTION EPIDURAL; INFILTRATION; INTRACAUDAL; PERINEURAL at 19:30

## 2020-06-16 RX ADMIN — ROCURONIUM BROMIDE 25 MG: 10 INJECTION, SOLUTION INTRAVENOUS at 19:35

## 2020-06-16 RX ADMIN — HYDROMORPHONE HYDROCHLORIDE 0.5 MG: 1 INJECTION, SOLUTION INTRAMUSCULAR; INTRAVENOUS; SUBCUTANEOUS at 21:03

## 2020-06-16 RX ADMIN — OXYCODONE HYDROCHLORIDE 10 MG: 10 TABLET ORAL at 23:12

## 2020-06-16 RX ADMIN — FENTANYL CITRATE 50 MCG: 50 INJECTION, SOLUTION INTRAMUSCULAR; INTRAVENOUS at 19:43

## 2020-06-16 RX ADMIN — HYDROMORPHONE HYDROCHLORIDE 0.5 MG: 1 INJECTION, SOLUTION INTRAMUSCULAR; INTRAVENOUS; SUBCUTANEOUS at 21:21

## 2020-06-16 RX ADMIN — NEOSTIGMINE METHYLSULFATE 3 MG: 1 INJECTION, SOLUTION INTRAVENOUS at 19:57

## 2020-06-16 RX ADMIN — PROPOFOL 200 MG: 10 INJECTION, EMULSION INTRAVENOUS at 19:30

## 2020-06-16 RX ADMIN — SODIUM CHLORIDE: 0.9 INJECTION, SOLUTION INTRAVENOUS at 19:13

## 2020-06-16 RX ADMIN — CLINDAMYCIN IN 5 PERCENT DEXTROSE 600 MG: 12 INJECTION, SOLUTION INTRAVENOUS at 18:48

## 2020-06-16 RX ADMIN — FENTANYL CITRATE 25 MCG: 50 INJECTION INTRAMUSCULAR; INTRAVENOUS at 20:20

## 2020-06-16 RX ADMIN — ROCURONIUM BROMIDE 5 MG: 10 INJECTION, SOLUTION INTRAVENOUS at 19:30

## 2020-06-16 RX ADMIN — ALBUTEROL SULFATE 2 PUFF: 90 AEROSOL, METERED RESPIRATORY (INHALATION) at 19:14

## 2020-06-16 RX ADMIN — MIDAZOLAM 2 MG: 1 INJECTION INTRAMUSCULAR; INTRAVENOUS at 19:23

## 2020-06-16 RX ADMIN — FENTANYL CITRATE 25 MCG: 50 INJECTION INTRAMUSCULAR; INTRAVENOUS at 20:25

## 2020-06-16 RX ADMIN — GLYCOPYRROLATE 0.4 MG: 0.2 INJECTION, SOLUTION INTRAMUSCULAR; INTRAVENOUS at 19:57

## 2020-06-16 RX ADMIN — ONDANSETRON 4 MG: 2 INJECTION INTRAMUSCULAR; INTRAVENOUS at 19:30

## 2020-06-16 RX ADMIN — SODIUM CHLORIDE 125 ML/HR: 0.9 INJECTION, SOLUTION INTRAVENOUS at 21:30

## 2020-06-16 RX ADMIN — Medication 100 MG: at 19:30

## 2020-06-16 RX ADMIN — FENTANYL CITRATE 25 MCG: 50 INJECTION INTRAMUSCULAR; INTRAVENOUS at 20:38

## 2020-06-17 LAB
ANION GAP SERPL CALCULATED.3IONS-SCNC: 10 MMOL/L (ref 4–13)
BUN SERPL-MCNC: 12 MG/DL (ref 5–25)
CALCIUM SERPL-MCNC: 8 MG/DL (ref 8.3–10.1)
CHLORIDE SERPL-SCNC: 104 MMOL/L (ref 100–108)
CO2 SERPL-SCNC: 25 MMOL/L (ref 21–32)
CREAT SERPL-MCNC: 0.66 MG/DL (ref 0.6–1.3)
ERYTHROCYTE [DISTWIDTH] IN BLOOD BY AUTOMATED COUNT: 21 % (ref 11.6–15.1)
GFR SERPL CREATININE-BSD FRML MDRD: 100 ML/MIN/1.73SQ M
GLUCOSE SERPL-MCNC: 107 MG/DL (ref 65–140)
HCT VFR BLD AUTO: 27.6 % (ref 34.8–46.1)
HGB BLD-MCNC: 7.6 G/DL (ref 11.5–15.4)
MCH RBC QN AUTO: 21.1 PG (ref 26.8–34.3)
MCHC RBC AUTO-ENTMCNC: 27.5 G/DL (ref 31.4–37.4)
MCV RBC AUTO: 77 FL (ref 82–98)
PLATELET # BLD AUTO: 210 THOUSANDS/UL (ref 149–390)
PMV BLD AUTO: 10.7 FL (ref 8.9–12.7)
POTASSIUM SERPL-SCNC: 3.4 MMOL/L (ref 3.5–5.3)
RBC # BLD AUTO: 3.61 MILLION/UL (ref 3.81–5.12)
SODIUM SERPL-SCNC: 139 MMOL/L (ref 136–145)
WBC # BLD AUTO: 4.54 THOUSAND/UL (ref 4.31–10.16)

## 2020-06-17 PROCEDURE — 85027 COMPLETE CBC AUTOMATED: CPT | Performed by: PHYSICIAN ASSISTANT

## 2020-06-17 PROCEDURE — 99232 SBSQ HOSP IP/OBS MODERATE 35: CPT | Performed by: STUDENT IN AN ORGANIZED HEALTH CARE EDUCATION/TRAINING PROGRAM

## 2020-06-17 PROCEDURE — 80048 BASIC METABOLIC PNL TOTAL CA: CPT | Performed by: PHYSICIAN ASSISTANT

## 2020-06-17 RX ORDER — OXYCODONE HYDROCHLORIDE AND ACETAMINOPHEN 5; 325 MG/1; MG/1
2 TABLET ORAL EVERY 4 HOURS PRN
Status: DISCONTINUED | OUTPATIENT
Start: 2020-06-17 | End: 2020-06-20 | Stop reason: HOSPADM

## 2020-06-17 RX ORDER — OXYCODONE HYDROCHLORIDE AND ACETAMINOPHEN 5; 325 MG/1; MG/1
1 TABLET ORAL EVERY 4 HOURS PRN
Status: DISCONTINUED | OUTPATIENT
Start: 2020-06-17 | End: 2020-06-17

## 2020-06-17 RX ADMIN — ALBUTEROL SULFATE 1 PUFF: 90 AEROSOL, METERED RESPIRATORY (INHALATION) at 00:42

## 2020-06-17 RX ADMIN — OXYCODONE HYDROCHLORIDE 5 MG: 5 TABLET ORAL at 07:18

## 2020-06-17 RX ADMIN — BUDESONIDE AND FORMOTEROL FUMARATE DIHYDRATE 2 PUFF: 80; 4.5 AEROSOL RESPIRATORY (INHALATION) at 09:04

## 2020-06-17 RX ADMIN — CLINDAMYCIN IN 5 PERCENT DEXTROSE 600 MG: 12 INJECTION, SOLUTION INTRAVENOUS at 07:18

## 2020-06-17 RX ADMIN — LORATADINE 10 MG: 10 TABLET ORAL at 09:04

## 2020-06-17 RX ADMIN — HYDROMORPHONE HYDROCHLORIDE 0.2 MG: 1 INJECTION, SOLUTION INTRAMUSCULAR; INTRAVENOUS; SUBCUTANEOUS at 22:12

## 2020-06-17 RX ADMIN — HYDROMORPHONE HYDROCHLORIDE 0.2 MG: 1 INJECTION, SOLUTION INTRAMUSCULAR; INTRAVENOUS; SUBCUTANEOUS at 14:03

## 2020-06-17 RX ADMIN — OXYCODONE HYDROCHLORIDE AND ACETAMINOPHEN 1 TABLET: 5; 325 TABLET ORAL at 09:49

## 2020-06-17 RX ADMIN — BUDESONIDE AND FORMOTEROL FUMARATE DIHYDRATE 2 PUFF: 80; 4.5 AEROSOL RESPIRATORY (INHALATION) at 00:39

## 2020-06-17 RX ADMIN — PANTOPRAZOLE SODIUM 40 MG: 40 TABLET, DELAYED RELEASE ORAL at 05:15

## 2020-06-17 RX ADMIN — NICOTINE 1 PATCH: 14 PATCH TRANSDERMAL at 09:04

## 2020-06-17 RX ADMIN — TRAZODONE HYDROCHLORIDE 300 MG: 100 TABLET ORAL at 00:40

## 2020-06-17 RX ADMIN — HEPARIN SODIUM 5000 UNITS: 5000 INJECTION INTRAVENOUS; SUBCUTANEOUS at 14:40

## 2020-06-17 RX ADMIN — BUDESONIDE AND FORMOTEROL FUMARATE DIHYDRATE 2 PUFF: 80; 4.5 AEROSOL RESPIRATORY (INHALATION) at 17:30

## 2020-06-17 RX ADMIN — CLINDAMYCIN IN 5 PERCENT DEXTROSE 600 MG: 12 INJECTION, SOLUTION INTRAVENOUS at 16:07

## 2020-06-17 RX ADMIN — OXYCODONE HYDROCHLORIDE AND ACETAMINOPHEN 2 TABLET: 5; 325 TABLET ORAL at 20:33

## 2020-06-17 RX ADMIN — SODIUM CHLORIDE 125 ML/HR: 0.9 INJECTION, SOLUTION INTRAVENOUS at 16:07

## 2020-06-17 RX ADMIN — ALBUTEROL SULFATE 1 PUFF: 90 AEROSOL, METERED RESPIRATORY (INHALATION) at 07:18

## 2020-06-17 RX ADMIN — HEPARIN SODIUM 5000 UNITS: 5000 INJECTION INTRAVENOUS; SUBCUTANEOUS at 22:13

## 2020-06-17 RX ADMIN — CLINDAMYCIN IN 5 PERCENT DEXTROSE 600 MG: 12 INJECTION, SOLUTION INTRAVENOUS at 23:32

## 2020-06-17 RX ADMIN — TRAZODONE HYDROCHLORIDE 300 MG: 100 TABLET ORAL at 23:28

## 2020-06-17 RX ADMIN — CLINDAMYCIN IN 5 PERCENT DEXTROSE 600 MG: 12 INJECTION, SOLUTION INTRAVENOUS at 00:46

## 2020-06-17 RX ADMIN — OXYCODONE HYDROCHLORIDE AND ACETAMINOPHEN 1 TABLET: 5; 325 TABLET ORAL at 14:39

## 2020-06-18 ENCOUNTER — APPOINTMENT (INPATIENT)
Dept: CT IMAGING | Facility: HOSPITAL | Age: 55
DRG: 857 | End: 2020-06-18
Payer: COMMERCIAL

## 2020-06-18 PROBLEM — D50.9 IRON DEFICIENCY ANEMIA: Status: ACTIVE | Noted: 2020-05-17

## 2020-06-18 LAB
ABO GROUP BLD: NORMAL
ABO GROUP BLD: NORMAL
ANION GAP SERPL CALCULATED.3IONS-SCNC: 10 MMOL/L (ref 4–13)
BASOPHILS # BLD AUTO: 0.02 THOUSANDS/ΜL (ref 0–0.1)
BASOPHILS NFR BLD AUTO: 1 % (ref 0–1)
BLD GP AB SCN SERPL QL: NEGATIVE
BUN SERPL-MCNC: 7 MG/DL (ref 5–25)
CALCIUM SERPL-MCNC: 6.1 MG/DL (ref 8.3–10.1)
CHLORIDE SERPL-SCNC: 113 MMOL/L (ref 100–108)
CO2 SERPL-SCNC: 20 MMOL/L (ref 21–32)
CREAT SERPL-MCNC: 0.41 MG/DL (ref 0.6–1.3)
EOSINOPHIL # BLD AUTO: 0.19 THOUSAND/ΜL (ref 0–0.61)
EOSINOPHIL NFR BLD AUTO: 5 % (ref 0–6)
ERYTHROCYTE [DISTWIDTH] IN BLOOD BY AUTOMATED COUNT: 21.2 % (ref 11.6–15.1)
FERRITIN SERPL-MCNC: 5 NG/ML (ref 8–388)
GFR SERPL CREATININE-BSD FRML MDRD: 118 ML/MIN/1.73SQ M
GLUCOSE SERPL-MCNC: 83 MG/DL (ref 65–140)
HCT VFR BLD AUTO: 30 % (ref 34.8–46.1)
HGB BLD-MCNC: 8.1 G/DL (ref 11.5–15.4)
IMM GRANULOCYTES # BLD AUTO: 0.02 THOUSAND/UL (ref 0–0.2)
IMM GRANULOCYTES NFR BLD AUTO: 1 % (ref 0–2)
IRON SATN MFR SERPL: 5 %
IRON SERPL-MCNC: 14 UG/DL (ref 50–170)
LYMPHOCYTES # BLD AUTO: 0.92 THOUSANDS/ΜL (ref 0.6–4.47)
LYMPHOCYTES NFR BLD AUTO: 25 % (ref 14–44)
MAGNESIUM SERPL-MCNC: 1.5 MG/DL (ref 1.6–2.6)
MCH RBC QN AUTO: 21.3 PG (ref 26.8–34.3)
MCHC RBC AUTO-ENTMCNC: 27 G/DL (ref 31.4–37.4)
MCV RBC AUTO: 79 FL (ref 82–98)
MONOCYTES # BLD AUTO: 0.35 THOUSAND/ΜL (ref 0.17–1.22)
MONOCYTES NFR BLD AUTO: 10 % (ref 4–12)
NEUTROPHILS # BLD AUTO: 2.14 THOUSANDS/ΜL (ref 1.85–7.62)
NEUTS SEG NFR BLD AUTO: 58 % (ref 43–75)
NRBC BLD AUTO-RTO: 0 /100 WBCS
PLATELET # BLD AUTO: 196 THOUSANDS/UL (ref 149–390)
PMV BLD AUTO: 10.2 FL (ref 8.9–12.7)
POTASSIUM SERPL-SCNC: 2.8 MMOL/L (ref 3.5–5.3)
RBC # BLD AUTO: 3.8 MILLION/UL (ref 3.81–5.12)
RH BLD: POSITIVE
RH BLD: POSITIVE
SODIUM SERPL-SCNC: 143 MMOL/L (ref 136–145)
SPECIMEN EXPIRATION DATE: NORMAL
TIBC SERPL-MCNC: 283 UG/DL (ref 250–450)
WBC # BLD AUTO: 3.64 THOUSAND/UL (ref 4.31–10.16)

## 2020-06-18 PROCEDURE — 83735 ASSAY OF MAGNESIUM: CPT | Performed by: STUDENT IN AN ORGANIZED HEALTH CARE EDUCATION/TRAINING PROGRAM

## 2020-06-18 PROCEDURE — 83540 ASSAY OF IRON: CPT | Performed by: STUDENT IN AN ORGANIZED HEALTH CARE EDUCATION/TRAINING PROGRAM

## 2020-06-18 PROCEDURE — 86901 BLOOD TYPING SEROLOGIC RH(D): CPT | Performed by: STUDENT IN AN ORGANIZED HEALTH CARE EDUCATION/TRAINING PROGRAM

## 2020-06-18 PROCEDURE — 85025 COMPLETE CBC W/AUTO DIFF WBC: CPT | Performed by: STUDENT IN AN ORGANIZED HEALTH CARE EDUCATION/TRAINING PROGRAM

## 2020-06-18 PROCEDURE — 70491 CT SOFT TISSUE NECK W/DYE: CPT

## 2020-06-18 PROCEDURE — 86900 BLOOD TYPING SEROLOGIC ABO: CPT | Performed by: STUDENT IN AN ORGANIZED HEALTH CARE EDUCATION/TRAINING PROGRAM

## 2020-06-18 PROCEDURE — 83550 IRON BINDING TEST: CPT | Performed by: STUDENT IN AN ORGANIZED HEALTH CARE EDUCATION/TRAINING PROGRAM

## 2020-06-18 PROCEDURE — 86850 RBC ANTIBODY SCREEN: CPT | Performed by: STUDENT IN AN ORGANIZED HEALTH CARE EDUCATION/TRAINING PROGRAM

## 2020-06-18 PROCEDURE — 99223 1ST HOSP IP/OBS HIGH 75: CPT | Performed by: INTERNAL MEDICINE

## 2020-06-18 PROCEDURE — 82728 ASSAY OF FERRITIN: CPT | Performed by: STUDENT IN AN ORGANIZED HEALTH CARE EDUCATION/TRAINING PROGRAM

## 2020-06-18 PROCEDURE — 80048 BASIC METABOLIC PNL TOTAL CA: CPT | Performed by: STUDENT IN AN ORGANIZED HEALTH CARE EDUCATION/TRAINING PROGRAM

## 2020-06-18 PROCEDURE — 99233 SBSQ HOSP IP/OBS HIGH 50: CPT | Performed by: STUDENT IN AN ORGANIZED HEALTH CARE EDUCATION/TRAINING PROGRAM

## 2020-06-18 RX ORDER — METRONIDAZOLE 500 MG/1
500 TABLET ORAL EVERY 8 HOURS SCHEDULED
Status: DISCONTINUED | OUTPATIENT
Start: 2020-06-18 | End: 2020-06-20

## 2020-06-18 RX ORDER — FERROUS SULFATE 325(65) MG
325 TABLET ORAL
Status: DISCONTINUED | OUTPATIENT
Start: 2020-06-19 | End: 2020-06-20 | Stop reason: HOSPADM

## 2020-06-18 RX ORDER — HYDROMORPHONE HCL/PF 1 MG/ML
0.5 SYRINGE (ML) INJECTION ONCE
Status: COMPLETED | OUTPATIENT
Start: 2020-06-18 | End: 2020-06-18

## 2020-06-18 RX ORDER — HYDROMORPHONE HCL/PF 1 MG/ML
0.2 SYRINGE (ML) INJECTION EVERY 4 HOURS PRN
Status: DISCONTINUED | OUTPATIENT
Start: 2020-06-18 | End: 2020-06-20 | Stop reason: HOSPADM

## 2020-06-18 RX ORDER — POTASSIUM CHLORIDE 20MEQ/15ML
20 LIQUID (ML) ORAL
Status: COMPLETED | OUTPATIENT
Start: 2020-06-18 | End: 2020-06-18

## 2020-06-18 RX ADMIN — HYDROMORPHONE HYDROCHLORIDE 0.5 MG: 1 INJECTION, SOLUTION INTRAMUSCULAR; INTRAVENOUS; SUBCUTANEOUS at 10:28

## 2020-06-18 RX ADMIN — HEPARIN SODIUM 5000 UNITS: 5000 INJECTION INTRAVENOUS; SUBCUTANEOUS at 06:01

## 2020-06-18 RX ADMIN — POTASSIUM CHLORIDE 20 MEQ: 20 SOLUTION ORAL at 10:28

## 2020-06-18 RX ADMIN — OXYCODONE HYDROCHLORIDE AND ACETAMINOPHEN 2 TABLET: 5; 325 TABLET ORAL at 06:01

## 2020-06-18 RX ADMIN — POTASSIUM CHLORIDE 20 MEQ: 20 SOLUTION ORAL at 12:19

## 2020-06-18 RX ADMIN — OXYCODONE HYDROCHLORIDE AND ACETAMINOPHEN 2 TABLET: 5; 325 TABLET ORAL at 23:05

## 2020-06-18 RX ADMIN — CLINDAMYCIN IN 5 PERCENT DEXTROSE 600 MG: 12 INJECTION, SOLUTION INTRAVENOUS at 08:31

## 2020-06-18 RX ADMIN — OXYCODONE HYDROCHLORIDE AND ACETAMINOPHEN 2 TABLET: 5; 325 TABLET ORAL at 12:18

## 2020-06-18 RX ADMIN — ALBUTEROL SULFATE 1 PUFF: 90 AEROSOL, METERED RESPIRATORY (INHALATION) at 15:01

## 2020-06-18 RX ADMIN — POTASSIUM CHLORIDE 20 MEQ: 20 SOLUTION ORAL at 08:31

## 2020-06-18 RX ADMIN — LORATADINE 10 MG: 10 TABLET ORAL at 08:31

## 2020-06-18 RX ADMIN — NICOTINE 1 PATCH: 14 PATCH TRANSDERMAL at 08:31

## 2020-06-18 RX ADMIN — CEFTRIAXONE SODIUM 1000 MG: 10 INJECTION, POWDER, FOR SOLUTION INTRAVENOUS at 14:06

## 2020-06-18 RX ADMIN — PANTOPRAZOLE SODIUM 40 MG: 40 TABLET, DELAYED RELEASE ORAL at 06:01

## 2020-06-18 RX ADMIN — BUDESONIDE AND FORMOTEROL FUMARATE DIHYDRATE 2 PUFF: 80; 4.5 AEROSOL RESPIRATORY (INHALATION) at 08:32

## 2020-06-18 RX ADMIN — HEPARIN SODIUM 5000 UNITS: 5000 INJECTION INTRAVENOUS; SUBCUTANEOUS at 14:06

## 2020-06-18 RX ADMIN — BUDESONIDE AND FORMOTEROL FUMARATE DIHYDRATE 2 PUFF: 80; 4.5 AEROSOL RESPIRATORY (INHALATION) at 17:55

## 2020-06-18 RX ADMIN — METRONIDAZOLE 500 MG: 500 TABLET ORAL at 14:06

## 2020-06-18 RX ADMIN — HEPARIN SODIUM 5000 UNITS: 5000 INJECTION INTRAVENOUS; SUBCUTANEOUS at 21:22

## 2020-06-18 RX ADMIN — OXYCODONE HYDROCHLORIDE AND ACETAMINOPHEN 2 TABLET: 5; 325 TABLET ORAL at 17:55

## 2020-06-18 RX ADMIN — METRONIDAZOLE 500 MG: 500 TABLET ORAL at 21:22

## 2020-06-18 RX ADMIN — IOHEXOL 85 ML: 350 INJECTION, SOLUTION INTRAVENOUS at 19:13

## 2020-06-18 RX ADMIN — HYDROMORPHONE HYDROCHLORIDE 0.2 MG: 1 INJECTION, SOLUTION INTRAMUSCULAR; INTRAVENOUS; SUBCUTANEOUS at 14:13

## 2020-06-18 RX ADMIN — HYDROMORPHONE HYDROCHLORIDE 0.2 MG: 1 INJECTION, SOLUTION INTRAMUSCULAR; INTRAVENOUS; SUBCUTANEOUS at 21:22

## 2020-06-19 LAB
ANION GAP SERPL CALCULATED.3IONS-SCNC: 9 MMOL/L (ref 4–13)
BUN SERPL-MCNC: 9 MG/DL (ref 5–25)
CALCIUM SERPL-MCNC: 8.3 MG/DL (ref 8.3–10.1)
CHLORIDE SERPL-SCNC: 103 MMOL/L (ref 100–108)
CO2 SERPL-SCNC: 24 MMOL/L (ref 21–32)
CREAT SERPL-MCNC: 0.6 MG/DL (ref 0.6–1.3)
GFR SERPL CREATININE-BSD FRML MDRD: 104 ML/MIN/1.73SQ M
GLUCOSE SERPL-MCNC: 95 MG/DL (ref 65–140)
MAGNESIUM SERPL-MCNC: 2.2 MG/DL (ref 1.6–2.6)
POTASSIUM SERPL-SCNC: 4.5 MMOL/L (ref 3.5–5.3)
SODIUM SERPL-SCNC: 136 MMOL/L (ref 136–145)

## 2020-06-19 PROCEDURE — 80048 BASIC METABOLIC PNL TOTAL CA: CPT | Performed by: STUDENT IN AN ORGANIZED HEALTH CARE EDUCATION/TRAINING PROGRAM

## 2020-06-19 PROCEDURE — 99233 SBSQ HOSP IP/OBS HIGH 50: CPT | Performed by: INTERNAL MEDICINE

## 2020-06-19 PROCEDURE — 83735 ASSAY OF MAGNESIUM: CPT | Performed by: STUDENT IN AN ORGANIZED HEALTH CARE EDUCATION/TRAINING PROGRAM

## 2020-06-19 PROCEDURE — 99232 SBSQ HOSP IP/OBS MODERATE 35: CPT | Performed by: STUDENT IN AN ORGANIZED HEALTH CARE EDUCATION/TRAINING PROGRAM

## 2020-06-19 RX ADMIN — NICOTINE 1 PATCH: 14 PATCH TRANSDERMAL at 08:05

## 2020-06-19 RX ADMIN — TRAZODONE HYDROCHLORIDE 300 MG: 100 TABLET ORAL at 22:59

## 2020-06-19 RX ADMIN — LORATADINE 10 MG: 10 TABLET ORAL at 08:05

## 2020-06-19 RX ADMIN — HYDROMORPHONE HYDROCHLORIDE 0.2 MG: 1 INJECTION, SOLUTION INTRAMUSCULAR; INTRAVENOUS; SUBCUTANEOUS at 05:17

## 2020-06-19 RX ADMIN — OXYCODONE HYDROCHLORIDE AND ACETAMINOPHEN 2 TABLET: 5; 325 TABLET ORAL at 08:05

## 2020-06-19 RX ADMIN — METRONIDAZOLE 500 MG: 500 TABLET ORAL at 13:12

## 2020-06-19 RX ADMIN — HEPARIN SODIUM 5000 UNITS: 5000 INJECTION INTRAVENOUS; SUBCUTANEOUS at 13:12

## 2020-06-19 RX ADMIN — METRONIDAZOLE 500 MG: 500 TABLET ORAL at 05:17

## 2020-06-19 RX ADMIN — BUDESONIDE AND FORMOTEROL FUMARATE DIHYDRATE 2 PUFF: 80; 4.5 AEROSOL RESPIRATORY (INHALATION) at 17:52

## 2020-06-19 RX ADMIN — FERROUS SULFATE TAB 325 MG (65 MG ELEMENTAL FE) 325 MG: 325 (65 FE) TAB at 08:05

## 2020-06-19 RX ADMIN — METRONIDAZOLE 500 MG: 500 TABLET ORAL at 21:09

## 2020-06-19 RX ADMIN — PANTOPRAZOLE SODIUM 40 MG: 40 TABLET, DELAYED RELEASE ORAL at 05:17

## 2020-06-19 RX ADMIN — HEPARIN SODIUM 5000 UNITS: 5000 INJECTION INTRAVENOUS; SUBCUTANEOUS at 05:17

## 2020-06-19 RX ADMIN — OXYCODONE HYDROCHLORIDE AND ACETAMINOPHEN 2 TABLET: 5; 325 TABLET ORAL at 17:52

## 2020-06-19 RX ADMIN — CEFTRIAXONE SODIUM 1000 MG: 10 INJECTION, POWDER, FOR SOLUTION INTRAVENOUS at 13:13

## 2020-06-19 RX ADMIN — BUDESONIDE AND FORMOTEROL FUMARATE DIHYDRATE 2 PUFF: 80; 4.5 AEROSOL RESPIRATORY (INHALATION) at 08:08

## 2020-06-19 RX ADMIN — TRAZODONE HYDROCHLORIDE 300 MG: 100 TABLET ORAL at 00:00

## 2020-06-19 RX ADMIN — OXYCODONE HYDROCHLORIDE AND ACETAMINOPHEN 2 TABLET: 5; 325 TABLET ORAL at 13:12

## 2020-06-19 RX ADMIN — OXYCODONE HYDROCHLORIDE AND ACETAMINOPHEN 2 TABLET: 5; 325 TABLET ORAL at 21:56

## 2020-06-19 RX ADMIN — HEPARIN SODIUM 5000 UNITS: 5000 INJECTION INTRAVENOUS; SUBCUTANEOUS at 21:09

## 2020-06-20 VITALS
BODY MASS INDEX: 44.54 KG/M2 | TEMPERATURE: 98.8 F | WEIGHT: 284.39 LBS | SYSTOLIC BLOOD PRESSURE: 146 MMHG | DIASTOLIC BLOOD PRESSURE: 75 MMHG | HEART RATE: 65 BPM | RESPIRATION RATE: 18 BRPM | OXYGEN SATURATION: 94 %

## 2020-06-20 PROCEDURE — 99239 HOSP IP/OBS DSCHRG MGMT >30: CPT | Performed by: STUDENT IN AN ORGANIZED HEALTH CARE EDUCATION/TRAINING PROGRAM

## 2020-06-20 PROCEDURE — 99233 SBSQ HOSP IP/OBS HIGH 50: CPT | Performed by: INTERNAL MEDICINE

## 2020-06-20 RX ORDER — CEFPODOXIME PROXETIL 200 MG/1
400 TABLET, FILM COATED ORAL 2 TIMES DAILY
Qty: 28 TABLET | Refills: 0 | Status: SHIPPED | OUTPATIENT
Start: 2020-06-20 | End: 2020-06-27

## 2020-06-20 RX ORDER — OXYCODONE HYDROCHLORIDE AND ACETAMINOPHEN 5; 325 MG/1; MG/1
1 TABLET ORAL EVERY 6 HOURS PRN
Qty: 12 TABLET | Refills: 0 | Status: SHIPPED | OUTPATIENT
Start: 2020-06-20 | End: 2020-06-23

## 2020-06-20 RX ADMIN — FERROUS SULFATE TAB 325 MG (65 MG ELEMENTAL FE) 325 MG: 325 (65 FE) TAB at 09:13

## 2020-06-20 RX ADMIN — OXYCODONE HYDROCHLORIDE AND ACETAMINOPHEN 2 TABLET: 5; 325 TABLET ORAL at 14:27

## 2020-06-20 RX ADMIN — PANTOPRAZOLE SODIUM 40 MG: 40 TABLET, DELAYED RELEASE ORAL at 05:27

## 2020-06-20 RX ADMIN — OXYCODONE HYDROCHLORIDE AND ACETAMINOPHEN 2 TABLET: 5; 325 TABLET ORAL at 09:13

## 2020-06-20 RX ADMIN — LORATADINE 10 MG: 10 TABLET ORAL at 09:13

## 2020-06-20 RX ADMIN — NICOTINE 1 PATCH: 14 PATCH TRANSDERMAL at 09:13

## 2020-06-20 RX ADMIN — CEFTRIAXONE SODIUM 1000 MG: 10 INJECTION, POWDER, FOR SOLUTION INTRAVENOUS at 12:57

## 2020-06-20 RX ADMIN — OXYCODONE HYDROCHLORIDE AND ACETAMINOPHEN 2 TABLET: 5; 325 TABLET ORAL at 03:09

## 2020-06-20 RX ADMIN — METRONIDAZOLE 500 MG: 500 TABLET ORAL at 05:27

## 2020-06-20 RX ADMIN — HEPARIN SODIUM 5000 UNITS: 5000 INJECTION INTRAVENOUS; SUBCUTANEOUS at 05:27

## 2020-06-20 RX ADMIN — BUDESONIDE AND FORMOTEROL FUMARATE DIHYDRATE 2 PUFF: 80; 4.5 AEROSOL RESPIRATORY (INHALATION) at 09:12

## 2020-06-21 LAB
BACTERIA BLD CULT: NORMAL
BACTERIA BLD CULT: NORMAL

## 2020-07-08 ENCOUNTER — HOSPITAL ENCOUNTER (OUTPATIENT)
Dept: CT IMAGING | Facility: HOSPITAL | Age: 55
Discharge: HOME | End: 2020-07-08
Attending: OTOLARYNGOLOGY
Payer: COMMERCIAL

## 2020-07-08 DIAGNOSIS — T81.42XA INFECTION OF DEEP INCISIONAL SURGICAL SITE AFTER PROCEDURE, INITIAL ENCOUNTER: ICD-10-CM

## 2020-07-08 PROCEDURE — 70491 CT SOFT TISSUE NECK W/DYE: CPT

## 2020-07-08 RX ADMIN — IOHEXOL 85 ML: 350 INJECTION, SOLUTION INTRAVENOUS at 13:28

## 2020-07-09 NOTE — H&P (VIEW-ONLY)
Assessment/Plan:    Tj Abreu he still seems to have a deep neck space infection  She has taken an extra week of antibiotics and despite this still has proud flesh at the incision site with scant drainage of what appears to be mucopus  There is also some swelling in the right submandibular region that is very tender  At this point I have scheduled her to be added on to the OR schedule on Friday of this week  We will perform a neck exploration, removal of proud flesh, possible removal of sublingual stone, possible muscle flap closure  She understands that there is still a risk of continued infection even with this additional surgery  These types of infections are difficult to control and the location is difficult to identify  Risks associated with the surgery also include bleeding, continued infection, scar formation, injury to the marginal mandibular branch of the facial nerve/weakness of the lip, sensory deficit of the skin around the incision site or around the chin region  She understands and accepts the risks of this procedure  She also understands that I will be performing the surgery with my partner Dr Kerns Later  I have given her a another refill on the pain medication which should hopefully last her through the weekend  I have also given her a refill on the antibiotic  I would like her to continue with this up until the date of surgery to prevent any worsening of swelling or inflammation that would hinder my ability to identify structures in the neck  I will let her know after the surgery whether not she needs to continue with this medicine postoperatively  Encounter Diagnosis     ICD-10-CM    1  Infection of deep incisional surgical site after procedure, subsequent encounter T81 42XD cefpodoxime (VANTIN) 200 mg tablet   2  Encounter for examination following surgery Z09 oxyCODONE-acetaminophen (PERCOCET) 5-325 mg per tablet              Patient ID: Yanira Randall is a 54 y  o  brooks Haynes was sent for CT of the neck last week after her appointment with me in the office  At that time she had continued severe tenderness in the right submandibular region, evidence of proud flesh at the incision site, and a scant amount of discolored discharge from the site  The CT scan did not demonstrate any evidence of recurrent abscess but there was evidence of minimal inflammation and phlegmon changes in the neck  There was also evidence of a stone on the floor of the mouth  She was maintained on the oral antibiotics and asked to come in today for repeat evaluation  She was told that if there was no significant improvement we would be adding her to the operating room schedule later this week for a neck exploration, possible muscle flap closure and possible stone removal   She states that over the last week her symptoms have remained unchanged  She still has a "hole in my neck, pain, and drainage"  She is still taking the pain medication - two per day and she again needs a refill          The following portions of the patient's history were reviewed and updated as appropriate: allergies, current medications, past family history, past medical history, past social history, past surgical history and problem list       Past Medical History:   Diagnosis Date    Anxiety     Arthritis     Asthma 01/29/2020    exacerbation and is on  steroids    Chronic pain disorder     Clotting disorder (HCC)     GERD (gastroesophageal reflux disease)     Psoriasis     Psoriatic arthritis ([address] )     Pulmonary emboli ([facility] [address] ) 1995    post humeral frac       Past Surgical History:   Procedure Laterality Date    ABDOMINAL SURGERY      CHOLECYSTECTOMY      COLON SURGERY      COLONOSCOPY      FRACTURE SURGERY  1995    humeral frac repair    GASTRIC BYPASS OPEN  2006    HERNIA REPAIR      X3    INCISION AND DRAINAGE ANTERIOR NECK Right 6/16/2020    Procedure: INCISION AND DRAINAGE  (I&D) NECK;  Surgeon: David Sousa DO;  Location: [facility];  Service: ENT    IVC [employer]    radha filter    PANNICULECTOMY      CT EXCISION SUBMAXILLARY GLAND Right 6/12/2020    Procedure: EXCISION SUBMANDIBULAR GLAND;  Surgeon: David Sousa DO;  Location: [facility];  Service: ENT    CT EXPLORE WOUND,ABDOMEN/FLANK/BACK N/A 1/31/2020    Procedure: exploation of abdominal wound removal suture granulomas and foreign body;  Surgeon: Phillip Martin MD;  Location: [address] [place] [address] MAIN OR;  Service: General    CT EXPLORE WOUND,ABDOMEN/FLANK/BACK N/A 3/2/2020    Procedure: EXPLORE WOUND OF BACK LEFT OPEN WITH PACKING;  Surgeon: Phillip Martin MD;  Location: [address] [place] [address] MAIN OR;  Service: General    SMALL INTESTINE SURGERY      VENA CAVA FILTER PLACEMENT         Social History     Tobacco Use    Smoking status: Current Every Day Smoker     Packs/day: 0 50    Smokeless tobacco: Never Used    Tobacco comment: 1/2 ppd or less   Substance Use Topics    Alcohol use: Yes     Frequency: Monthly or less     Binge frequency: Monthly    Drug use: Never       Current Outpatient Medications on File Prior to Visit   Medication Sig Dispense Refill    albuterol (PROVENTIL HFA,VENTOLIN HFA) 90 mcg/act inhaler Inhale 1 puff every 6 (six) hours as needed      budesonide-formoterol (SYMBICORT) 80-4 5 MCG/ACT inhaler Inhale 2 puffs 2 (two) times a day      cetirizine (ZyrTEC) 10 mg tablet Take 10 mg by mouth 2 (two) times a day      omeprazole (PRILOSEC) 20 mg delayed release capsule Take 20 mg by mouth daily      traZODone (DESYREL) 150 mg tablet TAKE 2 TABLETS AT BEDTIME      [DISCONTINUED] cefpodoxime (VANTIN) 200 mg tablet Take 1 tablet (200 mg total) by mouth 2 (two) times a day for 7 days 14 tablet 0    [DISCONTINUED] oxyCODONE-acetaminophen (PERCOCET) 5-325 mg per tablet Take 1 tablet by mouth every 6 (six) hours as needed for moderate painMax Daily Amount: 4 tablets 15 tablet 0     No current facility-administered medications on file prior to visit  Allergies   Allergen Reactions    Penicillins Other (See Comments)     "PARALYZES HER"  Was paralized  Was paralized      Morphine Itching           Review of Systems   Constitutional: Negative for activity change, appetite change, fever and unexpected weight change  HENT: Negative for congestion, ear discharge, ear pain, hearing loss, nosebleeds, postnasal drip, rhinorrhea, sinus pressure, sinus pain, sneezing, sore throat, tinnitus, trouble swallowing and voice change  Discharge right neck incision   Eyes: Negative  Negative for photophobia, pain, itching and visual disturbance  Respiratory: Negative  Negative for cough, chest tightness and shortness of breath  Cardiovascular: Negative  Negative for chest pain  Gastrointestinal: Negative  Negative for abdominal pain  Endocrine: Negative  Musculoskeletal: Negative for gait problem, neck pain (right sided) and neck stiffness  Skin: Negative  Allergic/Immunologic: Negative  Negative for environmental allergies  Neurological: Negative  Negative for dizziness, speech difficulty, light-headedness and headaches  Hematological: Negative  Negative for adenopathy  Psychiatric/Behavioral: Negative  Negative for sleep disturbance  The patient is not nervous/anxious  /80   Pulse 68   Temp (!) 97 2 °F (36 2 °C)   Ht 5' 7" (1 702 m)   Wt 122 kg (268 lb)   BMI 41 97 kg/m²       PHYSICAL  EXAMINATION    CONSTITUTION:    Appears appropriate for age  No evidence of any acute distress  Communicates normally  Voice quality is clear  Alert and oriented  HEAD/FACE:    Atraumatic, normocephalic on inspection  No scars present  Salivary glands: The parotid glands are normal in texture and size without any asymmetry    There is tenderness right submandibular area and unable to press on this very much - left side normal  Facial nerve function is symmetric and normal     EYES: Extraocular muscles intact in both eyes, normal gaze bilaterally and no evidence of nystagmus  Pupils equal, round, and accommodate to light bilaterally  EARS:    External ears normal     External canals are clear and dry  Tympanic membranes intact with normal mobility, no effusion, no retraction, no perforation  Post auricular area is normal    NOSE:    External nose without deformity  Internal mucosa pink and moist     Septum midline  Inferior nasal turbinates normal in color and size bilaterally    ORAL CAVITY:    Lips normal and healthy in appearance  Dentition normal     Gums healthy, pink and moist     Tongue appears pink and moist with no lesions  Floor of mouth pink, moist, and smooth right and left sides  Some mild trismus  Submandibular ducts patent with clear saliva but no saliva from right submandibular duct  Parotid ducts patent with clear saliva  Oral mucosa pink and moist     Hard palate normal in appearance without any lesions  OROPHARYNX:    Soft palate pink and moist without any lesions  Uvula midline without any lesions  Tonsils grade 1 bilaterally  Posterior pharynx pink and moist without any lesions    NECK:    Right side of neck with incision demonstrating some proud flesh - some discolored discharge at the site  No masses noted but very tender right submandibular area and some visible swelling  Trachea midline  No thyromegaly or nodules noted  LYMPH:    No palpable adenopathy in left or right neck    SKIN:    Skin changes secondary to underlying psoriasis - no erythema       HEART:   Regular rate and rhythm    LUNGS:  Clear to auscultation bilaterally

## 2020-07-16 ENCOUNTER — ANESTHESIA EVENT (OUTPATIENT)
Dept: PERIOP | Facility: HOSPITAL | Age: 55
End: 2020-07-16
Payer: COMMERCIAL

## 2020-07-16 RX ORDER — ESCITALOPRAM OXALATE 10 MG/1
10 TABLET ORAL DAILY
COMMUNITY
Start: 2020-07-02 | End: 2020-09-09

## 2020-07-16 NOTE — PRE-PROCEDURE INSTRUCTIONS
Pre-Surgery Instructions:   Medication Instructions    albuterol (PROVENTIL HFA,VENTOLIN HFA) 90 mcg/act inhaler Instructed patient per Anesthesia Guidelines   budesonide-formoterol (SYMBICORT) 80-4 5 MCG/ACT inhaler Instructed patient per Anesthesia Guidelines   cefpodoxime (VANTIN) 200 mg tablet Instructed patient per Anesthesia Guidelines   cetirizine (ZyrTEC) 10 mg tablet Instructed patient per Anesthesia Guidelines   escitalopram (LEXAPRO) 10 mg tablet Instructed patient per Anesthesia Guidelines   omeprazole (PRILOSEC) 20 mg delayed release capsule Instructed patient per Anesthesia Guidelines   oxyCODONE-acetaminophen (PERCOCET) 5-325 mg per tablet Instructed patient per Anesthesia Guidelines   traZODone (DESYREL) 150 mg tablet Instructed patient per Anesthesia Guidelines  Instructed to take Citalopram and Omeprazole with sip of water and to use Symbicort inhaler the morning of surgery  No aspirin or NSAIDs before surgery

## 2020-07-17 ENCOUNTER — ANESTHESIA (OUTPATIENT)
Dept: PERIOP | Facility: HOSPITAL | Age: 55
End: 2020-07-17
Payer: COMMERCIAL

## 2020-07-17 ENCOUNTER — HOSPITAL ENCOUNTER (OUTPATIENT)
Facility: HOSPITAL | Age: 55
Setting detail: OUTPATIENT SURGERY
Discharge: HOME | End: 2020-07-17
Attending: OTOLARYNGOLOGY | Admitting: OTOLARYNGOLOGY
Payer: COMMERCIAL

## 2020-07-17 VITALS
HEART RATE: 62 BPM | HEIGHT: 67 IN | RESPIRATION RATE: 18 BRPM | SYSTOLIC BLOOD PRESSURE: 169 MMHG | BODY MASS INDEX: 42.06 KG/M2 | OXYGEN SATURATION: 96 % | DIASTOLIC BLOOD PRESSURE: 74 MMHG | TEMPERATURE: 98.9 F | WEIGHT: 268 LBS

## 2020-07-17 LAB
EXT PREGNANCY TEST URINE: NEGATIVE
EXT. CONTROL: NORMAL
SARS-COV-2 RNA RESP QL NAA+PROBE: NEGATIVE

## 2020-07-17 PROCEDURE — 87635 SARS-COV-2 COVID-19 AMP PRB: CPT | Performed by: OTOLARYNGOLOGY

## 2020-07-17 PROCEDURE — 81025 URINE PREGNANCY TEST: CPT | Performed by: ANESTHESIOLOGY

## 2020-07-17 PROCEDURE — 35800 EXPLORE NECK VESSELS: CPT | Performed by: OTOLARYNGOLOGY

## 2020-07-17 RX ORDER — ONDANSETRON 2 MG/ML
4 INJECTION INTRAMUSCULAR; INTRAVENOUS EVERY 6 HOURS PRN
Status: DISCONTINUED | OUTPATIENT
Start: 2020-07-17 | End: 2020-07-17 | Stop reason: HOSPADM

## 2020-07-17 RX ORDER — SODIUM CHLORIDE 9 MG/ML
125 INJECTION, SOLUTION INTRAVENOUS CONTINUOUS
Status: DISCONTINUED | OUTPATIENT
Start: 2020-07-17 | End: 2020-07-17 | Stop reason: HOSPADM

## 2020-07-17 RX ORDER — ERGOCALCIFEROL 1.25 MG/1
50000 CAPSULE ORAL WEEKLY
COMMUNITY
Start: 2020-07-06 | End: 2021-05-27

## 2020-07-17 RX ORDER — PROPOFOL 10 MG/ML
INJECTION, EMULSION INTRAVENOUS AS NEEDED
Status: DISCONTINUED | OUTPATIENT
Start: 2020-07-17 | End: 2020-07-17 | Stop reason: SURG

## 2020-07-17 RX ORDER — GLYCOPYRROLATE 0.2 MG/ML
INJECTION INTRAMUSCULAR; INTRAVENOUS AS NEEDED
Status: DISCONTINUED | OUTPATIENT
Start: 2020-07-17 | End: 2020-07-17 | Stop reason: SURG

## 2020-07-17 RX ORDER — FENTANYL CITRATE 50 UG/ML
INJECTION, SOLUTION INTRAMUSCULAR; INTRAVENOUS AS NEEDED
Status: DISCONTINUED | OUTPATIENT
Start: 2020-07-17 | End: 2020-07-17 | Stop reason: SURG

## 2020-07-17 RX ORDER — DEXAMETHASONE SODIUM PHOSPHATE 4 MG/ML
INJECTION, SOLUTION INTRA-ARTICULAR; INTRALESIONAL; INTRAMUSCULAR; INTRAVENOUS; SOFT TISSUE AS NEEDED
Status: DISCONTINUED | OUTPATIENT
Start: 2020-07-17 | End: 2020-07-17 | Stop reason: SURG

## 2020-07-17 RX ORDER — GINSENG 100 MG
CAPSULE ORAL AS NEEDED
Status: DISCONTINUED | OUTPATIENT
Start: 2020-07-17 | End: 2020-07-17 | Stop reason: HOSPADM

## 2020-07-17 RX ORDER — MAGNESIUM HYDROXIDE 1200 MG/15ML
LIQUID ORAL AS NEEDED
Status: DISCONTINUED | OUTPATIENT
Start: 2020-07-17 | End: 2020-07-17 | Stop reason: HOSPADM

## 2020-07-17 RX ORDER — NEOSTIGMINE METHYLSULFATE 1 MG/ML
INJECTION INTRAVENOUS AS NEEDED
Status: DISCONTINUED | OUTPATIENT
Start: 2020-07-17 | End: 2020-07-17 | Stop reason: SURG

## 2020-07-17 RX ORDER — MIDAZOLAM HYDROCHLORIDE 2 MG/2ML
INJECTION, SOLUTION INTRAMUSCULAR; INTRAVENOUS AS NEEDED
Status: DISCONTINUED | OUTPATIENT
Start: 2020-07-17 | End: 2020-07-17 | Stop reason: SURG

## 2020-07-17 RX ORDER — ONDANSETRON 2 MG/ML
4 INJECTION INTRAMUSCULAR; INTRAVENOUS ONCE AS NEEDED
Status: DISCONTINUED | OUTPATIENT
Start: 2020-07-17 | End: 2020-07-17 | Stop reason: HOSPADM

## 2020-07-17 RX ORDER — OXYCODONE HYDROCHLORIDE AND ACETAMINOPHEN 5; 325 MG/1; MG/1
2 TABLET ORAL EVERY 4 HOURS PRN
Status: DISCONTINUED | OUTPATIENT
Start: 2020-07-17 | End: 2020-07-17 | Stop reason: HOSPADM

## 2020-07-17 RX ORDER — ACETAMINOPHEN 325 MG/1
650 TABLET ORAL EVERY 4 HOURS PRN
Status: DISCONTINUED | OUTPATIENT
Start: 2020-07-17 | End: 2020-07-17 | Stop reason: HOSPADM

## 2020-07-17 RX ORDER — ROCURONIUM BROMIDE 10 MG/ML
INJECTION, SOLUTION INTRAVENOUS AS NEEDED
Status: DISCONTINUED | OUTPATIENT
Start: 2020-07-17 | End: 2020-07-17 | Stop reason: SURG

## 2020-07-17 RX ORDER — FENTANYL CITRATE/PF 50 MCG/ML
25 SYRINGE (ML) INJECTION
Status: COMPLETED | OUTPATIENT
Start: 2020-07-17 | End: 2020-07-17

## 2020-07-17 RX ORDER — ONDANSETRON 2 MG/ML
INJECTION INTRAMUSCULAR; INTRAVENOUS AS NEEDED
Status: DISCONTINUED | OUTPATIENT
Start: 2020-07-17 | End: 2020-07-17 | Stop reason: SURG

## 2020-07-17 RX ORDER — LIDOCAINE HYDROCHLORIDE AND EPINEPHRINE 10; 10 MG/ML; UG/ML
INJECTION, SOLUTION INFILTRATION; PERINEURAL AS NEEDED
Status: DISCONTINUED | OUTPATIENT
Start: 2020-07-17 | End: 2020-07-17 | Stop reason: HOSPADM

## 2020-07-17 RX ORDER — LIDOCAINE HYDROCHLORIDE 20 MG/ML
INJECTION, SOLUTION INFILTRATION; PERINEURAL AS NEEDED
Status: DISCONTINUED | OUTPATIENT
Start: 2020-07-17 | End: 2020-07-17 | Stop reason: SURG

## 2020-07-17 RX ADMIN — FENTANYL CITRATE 25 MCG: 50 INJECTION INTRAMUSCULAR; INTRAVENOUS at 14:21

## 2020-07-17 RX ADMIN — SODIUM CHLORIDE 125 ML/HR: 0.9 INJECTION, SOLUTION INTRAVENOUS at 12:20

## 2020-07-17 RX ADMIN — DEXAMETHASONE SODIUM PHOSPHATE 4 MG: 4 INJECTION, SOLUTION INTRAMUSCULAR; INTRAVENOUS at 12:54

## 2020-07-17 RX ADMIN — LIDOCAINE HYDROCHLORIDE 5 ML: 20 INJECTION, SOLUTION INFILTRATION; PERINEURAL at 12:54

## 2020-07-17 RX ADMIN — ONDANSETRON 4 MG: 2 INJECTION INTRAMUSCULAR; INTRAVENOUS at 13:44

## 2020-07-17 RX ADMIN — GLYCOPYRROLATE 0.6 MG: 0.2 INJECTION, SOLUTION INTRAMUSCULAR; INTRAVENOUS at 13:47

## 2020-07-17 RX ADMIN — ROCURONIUM BROMIDE 50 MG: 10 INJECTION, SOLUTION INTRAVENOUS at 12:54

## 2020-07-17 RX ADMIN — NEOSTIGMINE METHYLSULFATE 3 MG: 1 INJECTION, SOLUTION INTRAVENOUS at 13:47

## 2020-07-17 RX ADMIN — PROPOFOL 200 MG: 10 INJECTION, EMULSION INTRAVENOUS at 12:54

## 2020-07-17 RX ADMIN — SODIUM CHLORIDE 125 ML/HR: 0.9 INJECTION, SOLUTION INTRAVENOUS at 14:51

## 2020-07-17 RX ADMIN — FENTANYL CITRATE 100 MCG: 50 INJECTION, SOLUTION INTRAMUSCULAR; INTRAVENOUS at 12:54

## 2020-07-17 RX ADMIN — FENTANYL CITRATE 50 MCG: 50 INJECTION, SOLUTION INTRAMUSCULAR; INTRAVENOUS at 13:34

## 2020-07-17 RX ADMIN — FENTANYL CITRATE 25 MCG: 50 INJECTION INTRAMUSCULAR; INTRAVENOUS at 14:12

## 2020-07-17 RX ADMIN — MIDAZOLAM 2 MG: 1 INJECTION INTRAMUSCULAR; INTRAVENOUS at 12:44

## 2020-07-17 RX ADMIN — FENTANYL CITRATE 50 MCG: 50 INJECTION, SOLUTION INTRAMUSCULAR; INTRAVENOUS at 13:12

## 2020-07-17 RX ADMIN — FENTANYL CITRATE 25 MCG: 50 INJECTION INTRAMUSCULAR; INTRAVENOUS at 14:39

## 2020-07-17 RX ADMIN — FENTANYL CITRATE 25 MCG: 50 INJECTION INTRAMUSCULAR; INTRAVENOUS at 14:28

## 2020-07-17 RX ADMIN — OXYCODONE HYDROCHLORIDE AND ACETAMINOPHEN 2 TABLET: 5; 325 TABLET ORAL at 15:47

## 2020-07-17 NOTE — DISCHARGE INSTRUCTIONS
[facility]    POST OPERATIVE EXCISION INSTRUCTIONS        1  Keep area of incision clean and dry  2   You may shower 24 hours after surgery  Do not scrub the incision site  Pat area gently with a towel to dry after shower  Do not soak wound  3   If your wound has tissue glue on the site do not use any topical creams or ointments  Do not pick or try to remove the glue  4   If your wound has sutures or staples on the site keep the site covered with some type of antibiotic ointment (Bacitracin, Neosporin, triple antibiotic ointment or Mupirocin)  If there is any accumulation of dried blood clean the site gently with hydrogen peroxide and gauze followed by the application of the antibiotic ointment  5   Take pain medication as needed  If a prescription for pain medication was given follow appropriate directions on label  If no prescription was given you may take Over the Counter pain medication as needed  6   No smoking  Avoid second hand smoke exposure  This causes delayed wound healing or wound breakdown  7   Call office at [phone] or [phone] for any of the following:  Fever greater than 101°F, redness or warmth of surgical area, acute swelling of surgical area, discharge from surgical area  8   Take the antibiotics given in the office

## 2020-07-17 NOTE — ANESTHESIA PREPROCEDURE EVALUATION
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, limb alert bracelet in place Arthritis     Neurology   Psychology   Anxiety,              Physical Exam    Airway    Mallampati score: I  TM Distance: >3 FB  Neck ROM: full     Dental   No notable dental hx     Cardiovascular  Rhythm: regular, Rate: normal,     Pulmonary  Pulmonary exam normal Breath sounds clear to auscultation,     Other Findings        Anesthesia Plan  ASA Score- 3 Emergent    Anesthesia Type- general with ASA Monitors  Additional Monitors:   Airway Plan: ETT  Plan Factors-    Induction- intravenous  Postoperative Plan- Plan for postoperative opioid use  Informed Consent- Anesthetic plan and risks discussed with patient  I personally reviewed this patient with the CRNA  Discussed and agreed on the Anesthesia Plan with the CRNA  Dominguez Black

## 2020-07-17 NOTE — ANESTHESIA POSTPROCEDURE EVALUATION
Post-Op Assessment Note    CV Status:  Stable    Pain management: adequate     Mental Status:  Alert and awake   Hydration Status:  Euvolemic   PONV Controlled:  Controlled   Airway Patency:  Patent   Post Op Vitals Reviewed: Yes      Staff: Anesthesiologist         BP      Temp      Pulse (!) 54 (07/17/20 1439)   Resp 19 (07/17/20 1439)    SpO2 96 % (07/17/20 1439) No

## 2020-07-17 NOTE — PERIOPERATIVE NURSING NOTE
Bhakti (OR) made aware that pt needs IV    Pt has only 1 limb to use & is noted for being a hard stick, annesthesia has had trouble starting IV in Past

## 2020-07-17 NOTE — INTERVAL H&P NOTE
H&P reviewed  After examining the patient I find no changes in the patients condition since the H&P had been written      Vitals:    07/17/20 1052   BP: 150/69   Pulse: 64   Resp: 16   Temp: (!) 97 2 °F (36 2 °C)   SpO2: 96%

## 2020-07-17 NOTE — OP NOTE
OPERATIVE REPORT  PATIENT NAME: Ashley Carcamo    :  1965  MRN: [id number]  Pt Location: [facility] OR ROOM 06    SURGERY DATE: 2020    Surgeon(s) and Role:     * Iftikhar Howard, DO - Primary     * Isabelle Mckeon, DO - Assisting    Preop Diagnosis:  Infection of deep incisional surgical site after procedure, subsequent encounter [T81 42XD]  Encounter for examination following surgery [Z09]    Post-Op Diagnosis Codes:     * Infection of deep incisional surgical site after procedure, subsequent encounter [T81 42XD]     * Encounter for examination following surgery [Z09]    Procedure(s) (LRB):  NECK EXPLORATION (N/A)    Specimen(s):  * No specimens in log *    Estimated Blood Loss:   Minimal    Drains:  Open Drain Right Neck (Active)   Number of days: 31       Anesthesia Type:   General    Operative Indications:  Infection of deep incisional surgical site after procedure, subsequent encounter [T81 42XD]  Encounter for examination following surgery [Z09]      Operative Findings: The area of proud flesh which was moist earlier in the week was now dried  There was no expression of pus  There was no significant swelling in the submandibular region prior to the surgical procedure  No significant findings were noted other than subcutaneous scar and old suture  Complications:   None    Procedure and Technique:  Patient was identified in the holding area  She was marked on the right side of the neck to denote the laterality of the case  No antibiotics were given on-call to the operating room since she had already been taking oral antibiotics throughout the week  She was placed under general anesthesia in the supine position  She was placed on a shoulder roll and the head was extended and turned to the left side  She was prepped and draped in the sterile fashion and a formal time-out was obtained  All information was noted to be correct    The previously incised area with the area of granulation or proud flesh was injected with 1% xylocaine with 1 100,000 epinephrine to a total of 5 mL  After waiting an appropriate amount of time for the medication to take effect an incision was made with a 15 blade to cut off the area of granulation tissue  Once again this area of granulation tissue was now drying up  I then incised the previously incised region where the scar was located and cut through significant scarring  As I did this I followed a small area of this granulation tissue into the scarred region but then the area was completely excised  This granulation tissue or moist tissue did not extend up into the submandibular region  I ultimately opened all the previously opened regions in the submandibular area  There appeared to be no remaining sub mandibular gland  The digastric muscle was intact and all surrounding tissues were without any significant inflammation or moisture  There was no pooling of saliva  There was some suture granuloma areas but I was unable to feel the stone that was located on the CT scan  Even with intraoral palpation I was unable to identify a stone on the floor of the mouth  I was able to identify some normal sublingual gland but there was no abnormal tissue in the area, no inflammation, and once again no leakage of saliva  At this point I elected to just close the site  Since there was significant improvement in the last 3 days I did not feel that opening or removing the sublingual area would be beneficial for her  The wound was closed in a layered fashion using interrupted 4 0 Vicryl followed by staples for the skin  She was covered with topical antibiotic ointment  She was a woken, extubated, and taken to recovery in stable condition     I was present for the entire procedure    Patient Disposition:  PACU     SIGNATURE: Ross Bowens DO  DATE: July 17, 2020  TIME: 1:53 PM

## 2020-08-07 ENCOUNTER — TRANSCRIBE ORDERS (OUTPATIENT)
Dept: ADMINISTRATIVE | Facility: HOSPITAL | Age: 55
End: 2020-08-07

## 2020-09-09 ENCOUNTER — OFFICE VISIT (OUTPATIENT)
Dept: SURGERY | Facility: CLINIC | Age: 55
End: 2020-09-09
Payer: COMMERCIAL

## 2020-09-09 VITALS
WEIGHT: 293 LBS | SYSTOLIC BLOOD PRESSURE: 146 MMHG | HEART RATE: 98 BPM | DIASTOLIC BLOOD PRESSURE: 98 MMHG | HEIGHT: 66 IN | BODY MASS INDEX: 47.09 KG/M2 | TEMPERATURE: 97.1 F

## 2020-09-09 DIAGNOSIS — L03.90 CELLULITIS: Primary | ICD-10-CM

## 2020-09-09 PROCEDURE — 99213 OFFICE O/P EST LOW 20 MIN: CPT | Performed by: SPECIALIST

## 2020-09-09 RX ORDER — ALBUTEROL SULFATE 90 UG/1
1 AEROSOL, METERED RESPIRATORY (INHALATION)
COMMUNITY
Start: 2019-10-04 | End: 2020-09-09

## 2020-09-09 RX ORDER — CLOBETASOL PROPIONATE 0.5 MG/G
OINTMENT TOPICAL 2 TIMES DAILY
COMMUNITY
Start: 2020-09-04 | End: 2020-09-09

## 2020-09-09 RX ORDER — BUDESONIDE AND FORMOTEROL FUMARATE DIHYDRATE 80; 4.5 UG/1; UG/1
2 AEROSOL RESPIRATORY (INHALATION) 2 TIMES DAILY
COMMUNITY
Start: 2020-08-12

## 2020-09-09 RX ORDER — AMLODIPINE BESYLATE 5 MG/1
5 TABLET ORAL
COMMUNITY
Start: 2020-07-30 | End: 2021-05-27

## 2020-09-09 RX ORDER — ESCITALOPRAM OXALATE 10 MG/1
10 TABLET ORAL DAILY
COMMUNITY
Start: 2020-07-02 | End: 2021-05-27

## 2020-09-16 NOTE — PROGRESS NOTES
Iqra Hall is an old shavon of ours on whom we have taking care of for many years now  Not delving into her deep history, recently we have taken care of her in regards to multiple suture granulomata  She has had previous multiple incisional hernia repairs with mesh and also placement of permanent suture  Fairly recently she has developed suture granulomata with some drainage in recurrent infection related to the sutures  These were removed in the operating room and she had done well  Also in April of this year in the office 1 of the granuloma sites was explored and an old spiral tack was removed  She was last seen May of this year  She presents today with a similar situation  She says the superior aspect of her incision has a small opening and some tenderness and some drainage  She says it is persistently draining serosanguineous fluid  No pus  Physical exam:  Middle-aged obese white female awake alert no distress    Abdomen multiple scars are noted  There is a small superficial opening of the superior aspect of the incision  No obvious purulence or erythema is noted  Small amount of serosanguineous fluid was noted  The wound is prepped and attempted to be probed  It is very superficial and goes in less than a cm  No obvious suture granuloma contribution is noted  Impression:  Superficial wound  Does not appear to be suture granuloma  Plan:  Redressed local care  Empiric antibiotics  Return 1 week

## 2021-05-25 ENCOUNTER — OFFICE VISIT (OUTPATIENT)
Dept: SURGERY | Facility: CLINIC | Age: 56
End: 2021-05-25
Payer: COMMERCIAL

## 2021-05-25 VITALS
DIASTOLIC BLOOD PRESSURE: 88 MMHG | BODY MASS INDEX: 47.09 KG/M2 | TEMPERATURE: 97.8 F | HEART RATE: 63 BPM | HEIGHT: 66 IN | WEIGHT: 293 LBS | SYSTOLIC BLOOD PRESSURE: 142 MMHG

## 2021-05-25 DIAGNOSIS — Z09 ENCOUNTER FOR EXAMINATION FOLLOWING SURGERY: ICD-10-CM

## 2021-05-25 DIAGNOSIS — T81.89XA SUTURE GRANULOMA: Primary | ICD-10-CM

## 2021-05-25 PROCEDURE — 99214 OFFICE O/P EST MOD 30 MIN: CPT | Performed by: SPECIALIST

## 2021-05-25 RX ORDER — OXYCODONE HYDROCHLORIDE AND ACETAMINOPHEN 5; 325 MG/1; MG/1
1 TABLET ORAL EVERY 6 HOURS PRN
Qty: 20 TABLET | Refills: 0 | Status: SHIPPED | OUTPATIENT
Start: 2021-05-25 | End: 2021-06-22 | Stop reason: SDUPTHER

## 2021-05-25 RX ORDER — ALBUTEROL SULFATE 90 UG/1
1 AEROSOL, METERED RESPIRATORY (INHALATION) EVERY 6 HOURS PRN
COMMUNITY
Start: 2021-01-07 | End: 2022-01-07

## 2021-05-25 RX ORDER — ADALIMUMAB 40MG/0.8ML
40 KIT SUBCUTANEOUS
COMMUNITY
End: 2021-06-03 | Stop reason: ALTCHOICE

## 2021-05-25 NOTE — LETTER
May 26, 2021     Referral [address] [place] [id number]    Patient: Aldo Day   YOB: 1965   Date of Visit: 5/25/2021       Dear  Nichole Whitt,    Thank you for referring Xiang Gregg to me for evaluation  Below are the relevant portions of my H&P  If you have questions, please do not hesitate to call me  I look forward to following Aiyana Ocampo along with you  Sincerely,     Mao Baker MD        CC: MD Lakeisha Alvarez MD  5/26/2021  1:57 PM  Addendum  Chief Complaint:  Recurrent suture granuloma      History of Present Illness:  Patient is a 61-year-old white female previous patient of ours for many years  At that time she was known as Priyanka Felxi but and now is her name is Xiang Gregg  Patient underwent an open gastric bypass at [facility] in 2000  At that time she weighed close to 600 lb  She currently weighs about 290 lb  She underwent a revision in 2006  Subsequent tummy tuck 2007 with hernia repair  Recurrent hernia repaired with mesh  Laparoscopic assisted repair of hernia and lysis of adhesions 2008  In 2015 she underwent removal of suture granulomas protruding through her incision  She developed symptomatic gallstones underwent laparoscopic cholecystectomy in January 2016  She presents to the office today with a swollen pain bulge at the superior aspect of her midline incision  She has undergone excision of suture granulomas in the past and this appears to be another 1  She has a long history of psoriatic arthritis and was recently placed on Humira  This helped her arthritis and also her psoriasis but made her legs swell  The etiology of her bilateral lower extremity edema is debated by her caregivers  She has currently stopped the Humira        Past Medical History:   Past Medical History:   Diagnosis Date    Anxiety     Arthritis     Asthma 01/29/2020    exacerbation and is on steroids    Chronic pain disorder     Clotting disorder (HCC)     GERD (gastroesophageal reflux disease)     History of transfusion     Pneumonia     Psoriasis     [facility] [address] [facility]     Pulmonary emboli ([facility] [address] ) 1995    post humeral frac         Past Surgical History:    Past Surgical History:   Procedure Laterality Date    ABDOMINAL SURGERY      CHOLECYSTECTOMY      COLON SURGERY      COLONOSCOPY      FACIAL/NECK BIOPSY N/A 7/17/2020    Procedure: NECK EXPLORATION;  Surgeon: Eduardo Santana DO;  [facility];  Service: ENT    FRACTURE SURGERY  1995    humeral frac repair    GASTRIC BYPASS OPEN  2006    HERNIA REPAIR      X3    INCISION AND DRAINAGE ANTERIOR NECK Right 6/16/2020    Procedure: INCISION AND DRAINAGE  (I&D) NECK;  Surgeon: Eduardo Santana DO;  [facility];  Service: ENT    IVC Devinside    radha filter    PANNICULECTOMY      RI EXCISION SUBMAXILLARY GLAND Right 6/12/2020    Procedure: EXCISION SUBMANDIBULAR GLAND;  Surgeon: Eduardo Santana DO;  [facility];  Service: ENT    RI EXPLORE WOUND,ABDOMEN/FLANK/BACK N/A 1/31/2020    Procedure: exploation of abdominal wound removal suture granulomas and foreign body;  Surgeon: Grady Corado MD;  Location: [address] [place] [address] MAIN OR;  Service: General    RI EXPLORE WOUND,ABDOMEN/FLANK/BACK N/A 3/2/2020    Procedure: EXPLORE WOUND OF BACK LEFT OPEN WITH PACKING;  Surgeon: Grady Corado MD;  Location:  MAIN OR;  Service: General    SMALL INTESTINE SURGERY      VENA CAVA FILTER PLACEMENT           Allergies:     Allergies   Allergen Reactions    Penicillins Other (See Comments) and Anaphylaxis     "PARALYZES HER"  Was paralized    "PARALYZES HER"  Was paralized  "PARALYZES HER"  Was paralized  "PARALYZES HER"  Was paralyzed    Morphine Itching    Prednisone Abdominal Pain     Worsening psoriasis         Medications:    Current Outpatient Medications:     adalimumab (Humira) 40 mg/0 8 mL PSKT, Inject 40 mg under the skin every 14 (fourteen) days, Disp: , Rfl:     albuterol (PROVENTIL HFA,VENTOLIN HFA) 90 mcg/act inhaler, Inhale 1 puff every 6 (six) hours as needed, Disp: , Rfl:     budesonide-formoterol (Symbicort) 80-4 5 MCG/ACT inhaler, Inhale 2 puffs 2 (two) times a day, Disp: , Rfl:     omeprazole (PRILOSEC) 20 mg delayed release capsule, Take 20 mg by mouth daily, Disp: , Rfl:     amLODIPine (NORVASC) 5 mg tablet, Take 5 mg by mouth daily, Disp: , Rfl:     cetirizine (ZyrTEC) 10 mg tablet, Take 10 mg by mouth 2 (two) times a day, Disp: , Rfl:     ergocalciferol (VITAMIN D2) 50,000 units, Take 50,000 Units by mouth once a week Mondays, Disp: , Rfl:     escitalopram (LEXAPRO) 10 mg tablet, Take 10 mg by mouth daily, Disp: , Rfl:     mupirocin (BACTROBAN) 2 % ointment, Apply topically 3 (three) times a day, Disp: 30 g, Rfl: 0    oxyCODONE-acetaminophen (PERCOCET) 5-325 mg per tablet, Take 1 tablet by mouth every 6 (six) hours as needed for moderate painMax Daily Amount: 4 tablets, Disp: 20 tablet, Rfl: 0      Social History:  Social History     Social History     Substance and Sexual Activity   Alcohol Use Yes    Frequency: Monthly or less    Drinks per session: 1 or 2    Binge frequency: Monthly     Social History     Substance and Sexual Activity   Drug Use Never     Social History     Tobacco Use   Smoking Status Current Some Day Smoker    Packs/day: 0 50    Types: Cigarettes   Smokeless Tobacco Never Used   Tobacco Comment    1/2 ppd or less         Family History:    Family History   Problem Relation Age of Onset    Heart block Mother     Diabetes Mother     No Known Problems Father          Review of Systems:      Bilateral lower extremity edema  Right upper extremity Irritation and excoriation  No purulence noted  Pain at the superior aspect of her midline incision at the site of the suture granuloma    No weight loss weight gain fever chills night sweats chest pain nausea vomiting diarrhea constipation shortness of breath sore throat headaches double vision blurry vision chronic cough etcetera  Other review of systems are negative  Vitals:  Vitals:    05/25/21 1500   BP: 142/88   Pulse: 63   Temp: 97 8 °F (36 6 °C)       Physical Exam:    The patient is a middle-aged obese white female who is awake alert no distress   Vital signs as abve  Skin she has diffuse generalized psoriatic rash with chronic flaky skin  Otherwise warm and dry  Head normocephalic and atraumatic   Eyes IVAN a m  intact  Ears and nose within normal limits  Throat gag reflex intact   Neck no masses thyromegaly lymphadenopathy palpable  Back no CVA or spinal tenderness   Lungs clear to a and P  Cor regular rate and rhythm no murmurs carotid bruits   Abdomen obese  Multiple scars consistent with previous surgery  She has a midline incision up to the xiphoid  At the superior access to the incision is an obvious boil that is not draining but is tender to palpation consistent with a suture granuloma  No other abdominal masses or hernias noted  Of course diffuse psoriasis is noted  Extremities bilateral lower extremity edema negative CC  Left upper extremity weakness  Neurologically A&O x3 cranial nerves 2-12 intact   Lymphatics no lymphadenopathy palpable  Lab Results: I have personally reviewed pertinent reports  See below  Imaging: I have personally reviewed pertinent reports  EKG, Pathology, and Other Studies: I have personally reviewed pertinent reports  No visits with results within 1 Day(s) from this visit     Latest known visit with results is:   Admission on 07/17/2020, Discharged on 07/17/2020   Component Date Value    SARS-CoV-2 07/17/2020 Negative     EXT Preg Test, Ur 07/17/2020 Negative     Control 07/17/2020 Valid          Impression:  Recurrent suture granuloma rule out focal mesh infection        Plan:   explore the wound under local anesthesia with IV sedation at the earliest possible date

## 2021-05-26 NOTE — H&P
Chief Complaint:  Recurrent suture granuloma      History of Present Illness:  Patient is a 35-year-old white female previous patient of ours for many years  At that time she was known as Steve Jackson but and now is her name is Maximo Cisneros  Patient underwent an open gastric bypass at [facility] in 2000  At that time she weighed close to 600 lb  She currently weighs about 290 lb  She underwent a revision in 2006  Subsequent tummy tuck 2007 with hernia repair  Recurrent hernia repaired with mesh  Laparoscopic assisted repair of hernia and lysis of adhesions 2008  In 2015 she underwent removal of suture granulomas protruding through her incision  She developed symptomatic gallstones underwent laparoscopic cholecystectomy in January 2016  She presents to the office today with a swollen pain bulge at the superior aspect of her midline incision  She has undergone excision of suture granulomas in the past and this appears to be another 1  She has a long history of psoriatic arthritis and was recently placed on Humira  This helped her arthritis and also her psoriasis but made her legs swell  The etiology of her bilateral lower extremity edema is debated by her caregivers  She has currently stopped the Humira        Past Medical History:   Past Medical History:   Diagnosis Date    Anxiety     Arthritis     Asthma 01/29/2020    exacerbation and is on  steroids    Chronic pain disorder     Clotting disorder (HCC)     GERD (gastroesophageal reflux disease)     History of transfusion     Pneumonia     Psoriasis     Psoriatic arthritis ([address] )     Pulmonary emboli ([address] ) 1995    post humeral frac         Past Surgical History:    Past Surgical History:   Procedure Laterality Date    ABDOMINAL SURGERY      CHOLECYSTECTOMY      COLON SURGERY      COLONOSCOPY      FACIAL/NECK BIOPSY N/A 7/17/2020    Procedure: NECK EXPLORATION;  Surgeon: Ross Bowens DO;  [facility];  Service: ENT   [address] humeral frac repair    GASTRIC BYPASS OPEN  2006    HERNIA REPAIR      X3    INCISION AND DRAINAGE ANTERIOR NECK Right 6/16/2020    Procedure: INCISION AND DRAINAGE  (I&D) NECK;  Surgeon: Marcial Armas DO;  [facility];  Service: ENT    IVC Devinside    radha filter    PANNICULECTOMY      AL EXCISION SUBMAXILLARY GLAND Right 6/12/2020    Procedure: EXCISION SUBMANDIBULAR GLAND;  Surgeon: Marcial Armas DO;  [facility];  Service: ENT    AL EXPLORE WOUND,ABDOMEN/FLANK/BACK N/A 1/31/2020    Procedure: exploation of abdominal wound removal suture granulomas and foreign body;  Surgeon: Stefanie Herrera MD;  Location: [address] [place] [address] MAIN OR;  Service: General    AL EXPLORE WOUND,ABDOMEN/FLANK/BACK N/A 3/2/2020    Procedure: EXPLORE WOUND OF BACK LEFT OPEN WITH PACKING;  Surgeon: Stefanie Herrera MD;  Location:  MAIN OR;  Service: General    SMALL INTESTINE SURGERY      VENA CAVA FILTER PLACEMENT           Allergies:     Allergies   Allergen Reactions    Penicillins Other (See Comments) and Anaphylaxis     "PARALYZES HER"  Was paralized    "PARALYZES HER"  Was paralized  "PARALYZES HER"  Was paralized  "PARALYZES HER"  Was paralyzed    Morphine Itching    Prednisone Abdominal Pain     Worsening psoriasis         Medications:    Current Outpatient Medications:     adalimumab (Humira) 40 mg/0 8 mL PSKT, Inject 40 mg under the skin every 14 (fourteen) days, Disp: , Rfl:     albuterol (PROVENTIL HFA,VENTOLIN HFA) 90 mcg/act inhaler, Inhale 1 puff every 6 (six) hours as needed, Disp: , Rfl:     budesonide-formoterol (Symbicort) 80-4 5 MCG/ACT inhaler, Inhale 2 puffs 2 (two) times a day, Disp: , Rfl:     omeprazole (PRILOSEC) 20 mg delayed release capsule, Take 20 mg by mouth daily, Disp: , Rfl:     amLODIPine (NORVASC) 5 mg tablet, Take 5 mg by mouth daily, Disp: , Rfl:     cetirizine (ZyrTEC) 10 mg tablet, Take 10 mg by mouth 2 (two) times a day, Disp: , Rfl:     ergocalciferol (VITAMIN D2) 50,000 units, Take 50,000 Units by mouth once a week Mondays, Disp: , Rfl:     escitalopram (LEXAPRO) 10 mg tablet, Take 10 mg by mouth daily, Disp: , Rfl:     mupirocin (BACTROBAN) 2 % ointment, Apply topically 3 (three) times a day, Disp: 30 g, Rfl: 0    oxyCODONE-acetaminophen (PERCOCET) 5-325 mg per tablet, Take 1 tablet by mouth every 6 (six) hours as needed for moderate painMax Daily Amount: 4 tablets, Disp: 20 tablet, Rfl: 0      Social History:  Social History     Social History     Substance and Sexual Activity   Alcohol Use Yes    Frequency: Monthly or less    Drinks per session: 1 or 2    Binge frequency: Monthly     Social History     Substance and Sexual Activity   Drug Use Never     Social History     Tobacco Use   Smoking Status Current Some Day Smoker    Packs/day: 0 50    Types: Cigarettes   Smokeless Tobacco Never Used   Tobacco Comment    1/2 ppd or less         Family History:    Family History   Problem Relation Age of Onset    Heart block Mother     Diabetes Mother     No Known Problems Father          Review of Systems:      Bilateral lower extremity edema  Right upper extremity Irritation and excoriation  No purulence noted  Pain at the superior aspect of her midline incision at the site of the suture granuloma  No weight loss weight gain fever chills night sweats chest pain nausea vomiting diarrhea constipation shortness of breath sore throat headaches double vision blurry vision chronic cough etcetera  Other review of systems are negative  Vitals:  Vitals:    05/25/21 1500   BP: 142/88   Pulse: 63   Temp: 97 8 °F (36 6 °C)       Physical Exam:    The patient is a middle-aged obese white female who is awake alert no distress   Vital signs as abve  Skin she has diffuse generalized psoriatic rash with chronic flaky skin  Otherwise warm and dry      Head normocephalic and atraumatic   Eyes IVAN a m  intact  Ears and nose within normal limits  Throat gag reflex intact   Neck no masses thyromegaly lymphadenopathy palpable  Back no CVA or spinal tenderness   Lungs clear to a and P  Cor regular rate and rhythm no murmurs carotid bruits   Abdomen obese  Multiple scars consistent with previous surgery  She has a midline incision up to the xiphoid  At the superior access to the incision is an obvious boil that is not draining but is tender to palpation consistent with a suture granuloma  No other abdominal masses or hernias noted  Of course diffuse psoriasis is noted  Extremities bilateral lower extremity edema negative CC  Left upper extremity weakness  Neurologically A&O x3 cranial nerves 2-12 intact   Lymphatics no lymphadenopathy palpable  Lab Results: I have personally reviewed pertinent reports  See below  Imaging: I have personally reviewed pertinent reports  EKG, Pathology, and Other Studies: I have personally reviewed pertinent reports  No visits with results within 1 Day(s) from this visit  Latest known visit with results is:   Admission on 07/17/2020, Discharged on 07/17/2020   Component Date Value    SARS-CoV-2 07/17/2020 Negative     EXT Preg Test, Ur 07/17/2020 Negative     Control 07/17/2020 Valid          Impression:  Recurrent suture granuloma rule out focal mesh infection        Plan:   explore the wound under local anesthesia with IV sedation at the earliest possible date

## 2021-05-26 NOTE — H&P (VIEW-ONLY)
Chief Complaint:  Recurrent suture granuloma      History of Present Illness:  Patient is a 54-year-old white female previous patient of ours for many years  At that time she was known as Magnolia Sam but and now is her name is Kerrie Powell  Patient underwent an open gastric bypass at [facility] in 2000  At that time she weighed close to 600 lb  She currently weighs about 290 lb  She underwent a revision in 2006  Subsequent tummy tuck 2007 with hernia repair  Recurrent hernia repaired with mesh  Laparoscopic assisted repair of hernia and lysis of adhesions 2008  In 2015 she underwent removal of suture granulomas protruding through her incision  She developed symptomatic gallstones underwent laparoscopic cholecystectomy in January 2016  She presents to the office today with a swollen pain bulge at the superior aspect of her midline incision  She has undergone excision of suture granulomas in the past and this appears to be another 1  She has a long history of psoriatic arthritis and was recently placed on Humira  This helped her arthritis and also her psoriasis but made her legs swell  The etiology of her bilateral lower extremity edema is debated by her caregivers  She has currently stopped the Humira        Past Medical History:   Past Medical History:   Diagnosis Date    Anxiety     Arthritis     Asthma 01/29/2020    exacerbation and is on  steroids    Chronic pain disorder     Clotting disorder (HCC)     GERD (gastroesophageal reflux disease)     History of transfusion     Pneumonia     Psoriasis     Psoriatic arthritis ([address] )     Pulmonary emboli ([facility] [address] ) 1995    post humeral frac         Past Surgical History:    Past Surgical History:   Procedure Laterality Date    ABDOMINAL SURGERY      CHOLECYSTECTOMY      COLON SURGERY      COLONOSCOPY      FACIAL/NECK BIOPSY N/A 7/17/2020    Procedure: NECK EXPLORATION;  Surgeon: Cornell Ochoa DO;  [facility];  Service: ENT   [address] humeral frac repair    GASTRIC BYPASS OPEN  2006    HERNIA REPAIR      X3    INCISION AND DRAINAGE ANTERIOR NECK Right 6/16/2020    Procedure: INCISION AND DRAINAGE  (I&D) NECK;  Surgeon: Shannon Ochoa DO;  [facility];  Service: ENT    IVC Devinside    radha filter    PANNICULECTOMY      IN EXCISION SUBMAXILLARY GLAND Right 6/12/2020    Procedure: EXCISION SUBMANDIBULAR GLAND;  Surgeon: Shannon Ochoa DO;  [facility];  Service: ENT    IN EXPLORE WOUND,ABDOMEN/FLANK/BACK N/A 1/31/2020    Procedure: exploation of abdominal wound removal suture granulomas and foreign body;  Surgeon: Ilda Taylor MD;  Location: [address] [place] [address] MAIN OR;  Service: General    IN EXPLORE WOUND,ABDOMEN/FLANK/BACK N/A 3/2/2020    Procedure: EXPLORE WOUND OF BACK LEFT OPEN WITH PACKING;  Surgeon: Ilda Taylor MD;  Location:  MAIN OR;  Service: General    SMALL INTESTINE SURGERY      VENA CAVA FILTER PLACEMENT           Allergies:     Allergies   Allergen Reactions    Penicillins Other (See Comments) and Anaphylaxis     "PARALYZES HER"  Was paralized    "PARALYZES HER"  Was paralized  "PARALYZES HER"  Was paralized  "PARALYZES HER"  Was paralyzed    Morphine Itching    Prednisone Abdominal Pain     Worsening psoriasis         Medications:    Current Outpatient Medications:     adalimumab (Humira) 40 mg/0 8 mL PSKT, Inject 40 mg under the skin every 14 (fourteen) days, Disp: , Rfl:     albuterol (PROVENTIL HFA,VENTOLIN HFA) 90 mcg/act inhaler, Inhale 1 puff every 6 (six) hours as needed, Disp: , Rfl:     budesonide-formoterol (Symbicort) 80-4 5 MCG/ACT inhaler, Inhale 2 puffs 2 (two) times a day, Disp: , Rfl:     omeprazole (PRILOSEC) 20 mg delayed release capsule, Take 20 mg by mouth daily, Disp: , Rfl:     amLODIPine (NORVASC) 5 mg tablet, Take 5 mg by mouth daily, Disp: , Rfl:     cetirizine (ZyrTEC) 10 mg tablet, Take 10 mg by mouth 2 (two) times a day, Disp: , Rfl:     ergocalciferol (VITAMIN D2) 50,000 units, Take 50,000 Units by mouth once a week Mondays, Disp: , Rfl:     escitalopram (LEXAPRO) 10 mg tablet, Take 10 mg by mouth daily, Disp: , Rfl:     mupirocin (BACTROBAN) 2 % ointment, Apply topically 3 (three) times a day, Disp: 30 g, Rfl: 0    oxyCODONE-acetaminophen (PERCOCET) 5-325 mg per tablet, Take 1 tablet by mouth every 6 (six) hours as needed for moderate painMax Daily Amount: 4 tablets, Disp: 20 tablet, Rfl: 0      Social History:  Social History     Social History     Substance and Sexual Activity   Alcohol Use Yes    Frequency: Monthly or less    Drinks per session: 1 or 2    Binge frequency: Monthly     Social History     Substance and Sexual Activity   Drug Use Never     Social History     Tobacco Use   Smoking Status Current Some Day Smoker    Packs/day: 0 50    Types: Cigarettes   Smokeless Tobacco Never Used   Tobacco Comment    1/2 ppd or less         Family History:    Family History   Problem Relation Age of Onset    Heart block Mother     Diabetes Mother     No Known Problems Father          Review of Systems:      Bilateral lower extremity edema  Right upper extremity Irritation and excoriation  No purulence noted  Pain at the superior aspect of her midline incision at the site of the suture granuloma  No weight loss weight gain fever chills night sweats chest pain nausea vomiting diarrhea constipation shortness of breath sore throat headaches double vision blurry vision chronic cough etcetera  Other review of systems are negative  Vitals:  Vitals:    05/25/21 1500   BP: 142/88   Pulse: 63   Temp: 97 8 °F (36 6 °C)       Physical Exam:    The patient is a middle-aged obese white female who is awake alert no distress   Vital signs as abve  Skin she has diffuse generalized psoriatic rash with chronic flaky skin  Otherwise warm and dry      Head normocephalic and atraumatic   Eyes IVAN a m  intact  Ears and nose within normal limits  Throat gag reflex intact   Neck no masses thyromegaly lymphadenopathy palpable  Back no CVA or spinal tenderness   Lungs clear to a and P  Cor regular rate and rhythm no murmurs carotid bruits   Abdomen obese  Multiple scars consistent with previous surgery  She has a midline incision up to the xiphoid  At the superior access to the incision is an obvious boil that is not draining but is tender to palpation consistent with a suture granuloma  No other abdominal masses or hernias noted  Of course diffuse psoriasis is noted  Extremities bilateral lower extremity edema negative CC  Left upper extremity weakness  Neurologically A&O x3 cranial nerves 2-12 intact   Lymphatics no lymphadenopathy palpable  Lab Results: I have personally reviewed pertinent reports  See below  Imaging: I have personally reviewed pertinent reports  EKG, Pathology, and Other Studies: I have personally reviewed pertinent reports  No visits with results within 1 Day(s) from this visit  Latest known visit with results is:   Admission on 07/17/2020, Discharged on 07/17/2020   Component Date Value    SARS-CoV-2 07/17/2020 Negative     EXT Preg Test, Ur 07/17/2020 Negative     Control 07/17/2020 Valid          Impression:  Recurrent suture granuloma rule out focal mesh infection        Plan:   explore the wound under local anesthesia with IV sedation at the earliest possible date

## 2021-05-27 RX ORDER — LORATADINE 10 MG/1
10 TABLET ORAL 2 TIMES DAILY
COMMUNITY

## 2021-05-27 NOTE — PRE-PROCEDURE INSTRUCTIONS
Pre-Surgery Instructions:   Medication Instructions    adalimumab (Humira) 40 mg/0 8 mL PSKT Patient was instructed by Physician and understands   albuterol (PROVENTIL HFA,VENTOLIN HFA) 90 mcg/act inhaler Instructed patient per Anesthesia Guidelines   budesonide-formoterol (Symbicort) 80-4 5 MCG/ACT inhaler Instructed patient per Anesthesia Guidelines   loratadine (CLARITIN) 10 mg tablet Instructed patient per Anesthesia Guidelines   mupirocin (BACTROBAN) 2 % ointment Instructed patient per Anesthesia Guidelines   omeprazole (PRILOSEC) 20 mg delayed release capsule Instructed patient per Anesthesia Guidelines  Pre op and bathing instructions reviewed  Pt has hibiclens  Pt  Verbalized understanding of current visitor restrictions  Pt  Verbalized an understanding of all instructions reviewed and offers no concerns at this time  Instructed to avoid all ASA/NSAIDs and OTC Vit/Supp from now until after surgery   Tylenol ok prn  Instructed to take per normal schedule including DOS with sips water DOS may use inhalers and will take Claritin and Omeprazole with a few sips of H2O

## 2021-06-02 ENCOUNTER — ANESTHESIA EVENT (OUTPATIENT)
Dept: PERIOP | Facility: HOSPITAL | Age: 56
End: 2021-06-02
Payer: COMMERCIAL

## 2021-06-02 PROBLEM — F17.200 SMOKING: Status: ACTIVE | Noted: 2021-06-02

## 2021-06-02 PROBLEM — K21.9 GASTROESOPHAGEAL REFLUX DISEASE WITHOUT ESOPHAGITIS: Status: ACTIVE | Noted: 2021-06-02

## 2021-06-02 PROBLEM — D68.9 COAGULATION DISORDER (HCC): Status: ACTIVE | Noted: 2021-06-02

## 2021-06-02 PROBLEM — I82.409 DEEP VEIN THROMBOSIS (DVT) OF LOWER EXTREMITY (HCC): Status: ACTIVE | Noted: 2021-06-02

## 2021-06-02 PROBLEM — M19.90 ARTHRITIS: Status: ACTIVE | Noted: 2021-06-02

## 2021-06-02 PROBLEM — G89.29 OTHER CHRONIC PAIN: Status: ACTIVE | Noted: 2021-06-02

## 2021-06-02 PROBLEM — D84.9 IMMUNOCOMPROMISED (HCC): Status: ACTIVE | Noted: 2021-06-02

## 2021-06-02 PROBLEM — J43.9 PULMONARY EMPHYSEMA (HCC): Status: ACTIVE | Noted: 2021-06-02

## 2021-06-02 PROBLEM — Z98.84 H/O BARIATRIC SURGERY: Status: ACTIVE | Noted: 2021-06-02

## 2021-06-02 NOTE — ANESTHESIA PREPROCEDURE EVALUATION
Procedure:  Removal of suture granuloma abdomen (N/A Abdomen)    Relevant Problems   ANESTHESIA  old charts reviewed  pt known to me      CARDIO  Hx of IVC filter placement   (+) Deep vein thrombosis (DVT) of lower extremity (HCC)   (-) MI (myocardial infarction) ([facility] [address] )      ENDO  morbid obesity      GI/HEPATIC   (+) Gastroesophageal reflux disease without esophagitis   (+) H/O bariatric surgery      GYN   (-) Currently pregnant      HEMATOLOGY  mild polycythimia  no longer anemic  hx of unknown coagulopathy   (+) Coagulation disorder (HCC)   (+) Immunocompromised (HCC)   (+) Iron deficiency anemia      MUSCULOSKELETAL   (+) Arthritis      NEURO/PSYCH   (+) Other chronic pain      PULMONARY  smoking cessation stressed   (+) Pulmonary emphysema (HCC)   (+) Smoking   (-) Oxygen dependent   (-) Sleep apnea   (-) URI (upper respiratory infection)        Physical Exam    Airway    Mallampati score: II  TM Distance: >3 FB  Neck ROM: full     Dental       Cardiovascular  Cardiovascular exam normal    Pulmonary  Pulmonary exam normal     Other Findings  Fixed upper and lower teeth and in good repair      Anesthesia Plan  ASA Score- 3     Anesthesia Type- IV sedation with anesthesia with ASA Monitors  Additional Monitors:   Airway Plan:           Plan Factors-Exercise tolerance (METS): >4 METS  Chart reviewed  EKG reviewed  Imaging results reviewed  Existing labs reviewed  Patient summary reviewed  Patient is a current smoker  Patient instructed to abstain from smoking on day of procedure  Patient did not smoke on day of surgery  Obstructive sleep apnea risk education given perioperatively  Induction- intravenous  Postoperative Plan- Plan for postoperative opioid use  Informed Consent- Anesthetic plan and risks discussed with patient  I personally reviewed this patient with the CRNA  Discussed and agreed on the Anesthesia Plan with the CRNA  Nick Wisdom

## 2021-06-03 ENCOUNTER — APPOINTMENT (EMERGENCY)
Dept: NON INVASIVE DIAGNOSTICS | Facility: HOSPITAL | Age: 56
End: 2021-06-03
Payer: COMMERCIAL

## 2021-06-03 ENCOUNTER — HOSPITAL ENCOUNTER (EMERGENCY)
Facility: HOSPITAL | Age: 56
Discharge: HOME | End: 2021-06-03
Attending: EMERGENCY MEDICINE | Admitting: EMERGENCY MEDICINE
Payer: COMMERCIAL

## 2021-06-03 VITALS
RESPIRATION RATE: 19 BRPM | WEIGHT: 211.64 LBS | TEMPERATURE: 98.4 F | HEART RATE: 63 BPM | HEIGHT: 66 IN | OXYGEN SATURATION: 97 % | DIASTOLIC BLOOD PRESSURE: 76 MMHG | BODY MASS INDEX: 34.01 KG/M2 | SYSTOLIC BLOOD PRESSURE: 172 MMHG

## 2021-06-03 DIAGNOSIS — M79.604 CHRONIC PAIN OF BOTH LOWER EXTREMITIES: ICD-10-CM

## 2021-06-03 DIAGNOSIS — L40.9 PSORIASIS: ICD-10-CM

## 2021-06-03 DIAGNOSIS — I89.0 LYMPHEDEMA OF BOTH LOWER EXTREMITIES: ICD-10-CM

## 2021-06-03 DIAGNOSIS — M79.605 CHRONIC PAIN OF BOTH LOWER EXTREMITIES: ICD-10-CM

## 2021-06-03 DIAGNOSIS — G89.29 CHRONIC PAIN OF BOTH LOWER EXTREMITIES: ICD-10-CM

## 2021-06-03 DIAGNOSIS — M79.601 RIGHT ARM PAIN: Primary | ICD-10-CM

## 2021-06-03 LAB
ALBUMIN SERPL BCP-MCNC: 3.1 G/DL (ref 3.5–5)
ALP SERPL-CCNC: 163 U/L (ref 46–116)
ALT SERPL W P-5'-P-CCNC: 37 U/L (ref 12–78)
ANION GAP SERPL CALCULATED.3IONS-SCNC: 10 MMOL/L (ref 4–13)
AST SERPL W P-5'-P-CCNC: 21 U/L (ref 5–45)
ATRIAL RATE: 53 BPM
BASOPHILS # BLD AUTO: 0.03 THOUSANDS/ΜL (ref 0–0.1)
BASOPHILS NFR BLD AUTO: 1 % (ref 0–1)
BILIRUB SERPL-MCNC: 0.79 MG/DL (ref 0.2–1)
BILIRUB UR QL STRIP: ABNORMAL
BUN SERPL-MCNC: 11 MG/DL (ref 5–25)
CALCIUM ALBUM COR SERPL-MCNC: 9.5 MG/DL (ref 8.3–10.1)
CALCIUM SERPL-MCNC: 8.8 MG/DL (ref 8.3–10.1)
CHLORIDE SERPL-SCNC: 105 MMOL/L (ref 100–108)
CLARITY UR: CLEAR
CO2 SERPL-SCNC: 27 MMOL/L (ref 21–32)
COLOR UR: YELLOW
CREAT SERPL-MCNC: 0.62 MG/DL (ref 0.6–1.3)
EOSINOPHIL # BLD AUTO: 0.2 THOUSAND/ΜL (ref 0–0.61)
EOSINOPHIL NFR BLD AUTO: 5 % (ref 0–6)
ERYTHROCYTE [DISTWIDTH] IN BLOOD BY AUTOMATED COUNT: 13.9 % (ref 11.6–15.1)
GFR SERPL CREATININE-BSD FRML MDRD: 102 ML/MIN/1.73SQ M
GLUCOSE SERPL-MCNC: 105 MG/DL (ref 65–140)
GLUCOSE UR STRIP-MCNC: NEGATIVE MG/DL
HCT VFR BLD AUTO: 41.6 % (ref 34.8–46.1)
HGB BLD-MCNC: 13.6 G/DL (ref 11.5–15.4)
HGB UR QL STRIP.AUTO: NEGATIVE
IMM GRANULOCYTES # BLD AUTO: 0.01 THOUSAND/UL (ref 0–0.2)
IMM GRANULOCYTES NFR BLD AUTO: 0 % (ref 0–2)
KETONES UR STRIP-MCNC: NEGATIVE MG/DL
LEUKOCYTE ESTERASE UR QL STRIP: NEGATIVE
LYMPHOCYTES # BLD AUTO: 1.09 THOUSANDS/ΜL (ref 0.6–4.47)
LYMPHOCYTES NFR BLD AUTO: 26 % (ref 14–44)
MCH RBC QN AUTO: 30.8 PG (ref 26.8–34.3)
MCHC RBC AUTO-ENTMCNC: 32.7 G/DL (ref 31.4–37.4)
MCV RBC AUTO: 94 FL (ref 82–98)
MONOCYTES # BLD AUTO: 0.38 THOUSAND/ΜL (ref 0.17–1.22)
MONOCYTES NFR BLD AUTO: 9 % (ref 4–12)
NEUTROPHILS # BLD AUTO: 2.52 THOUSANDS/ΜL (ref 1.85–7.62)
NEUTS SEG NFR BLD AUTO: 59 % (ref 43–75)
NITRITE UR QL STRIP: NEGATIVE
NRBC BLD AUTO-RTO: 0 /100 WBCS
P AXIS: 34 DEGREES
PH UR STRIP.AUTO: 5.5 [PH]
PLATELET # BLD AUTO: 213 THOUSANDS/UL (ref 149–390)
PMV BLD AUTO: 10.9 FL (ref 8.9–12.7)
POTASSIUM SERPL-SCNC: 4.2 MMOL/L (ref 3.5–5.3)
PR INTERVAL: 202 MS
PROT SERPL-MCNC: 7.2 G/DL (ref 6.4–8.2)
PROT UR STRIP-MCNC: NEGATIVE MG/DL
QRS AXIS: 10 DEGREES
QRSD INTERVAL: 82 MS
QT INTERVAL: 462 MS
QTC INTERVAL: 433 MS
RBC # BLD AUTO: 4.41 MILLION/UL (ref 3.81–5.12)
SODIUM SERPL-SCNC: 142 MMOL/L (ref 136–145)
SP GR UR STRIP.AUTO: >=1.03 (ref 1–1.03)
T WAVE AXIS: 30 DEGREES
TROPONIN I SERPL-MCNC: <0.02 NG/ML
UROBILINOGEN UR QL STRIP.AUTO: 1 E.U./DL
VENTRICULAR RATE: 53 BPM
WBC # BLD AUTO: 4.23 THOUSAND/UL (ref 4.31–10.16)

## 2021-06-03 PROCEDURE — 93005 ELECTROCARDIOGRAM TRACING: CPT

## 2021-06-03 PROCEDURE — 36415 COLL VENOUS BLD VENIPUNCTURE: CPT | Performed by: PHYSICIAN ASSISTANT

## 2021-06-03 PROCEDURE — 81003 URINALYSIS AUTO W/O SCOPE: CPT | Performed by: PHYSICIAN ASSISTANT

## 2021-06-03 PROCEDURE — 84484 ASSAY OF TROPONIN QUANT: CPT | Performed by: PHYSICIAN ASSISTANT

## 2021-06-03 PROCEDURE — 96375 TX/PRO/DX INJ NEW DRUG ADDON: CPT

## 2021-06-03 PROCEDURE — 93010 ELECTROCARDIOGRAM REPORT: CPT | Performed by: INTERNAL MEDICINE

## 2021-06-03 PROCEDURE — 93971 EXTREMITY STUDY: CPT

## 2021-06-03 PROCEDURE — 93971 EXTREMITY STUDY: CPT | Performed by: SURGERY

## 2021-06-03 PROCEDURE — 99285 EMERGENCY DEPT VISIT HI MDM: CPT | Performed by: PHYSICIAN ASSISTANT

## 2021-06-03 PROCEDURE — 80053 COMPREHEN METABOLIC PANEL: CPT | Performed by: PHYSICIAN ASSISTANT

## 2021-06-03 PROCEDURE — 85025 COMPLETE CBC W/AUTO DIFF WBC: CPT | Performed by: PHYSICIAN ASSISTANT

## 2021-06-03 PROCEDURE — 96374 THER/PROPH/DIAG INJ IV PUSH: CPT

## 2021-06-03 PROCEDURE — 99284 EMERGENCY DEPT VISIT MOD MDM: CPT

## 2021-06-03 RX ORDER — KETOROLAC TROMETHAMINE 30 MG/ML
15 INJECTION, SOLUTION INTRAMUSCULAR; INTRAVENOUS ONCE
Status: COMPLETED | OUTPATIENT
Start: 2021-06-03 | End: 2021-06-03

## 2021-06-03 RX ORDER — TRAMADOL HYDROCHLORIDE 50 MG/1
50 TABLET ORAL EVERY 6 HOURS PRN
Qty: 8 TABLET | Refills: 0 | Status: SHIPPED | OUTPATIENT
Start: 2021-06-03 | End: 2021-06-13

## 2021-06-03 RX ORDER — FENTANYL CITRATE 50 UG/ML
50 INJECTION, SOLUTION INTRAMUSCULAR; INTRAVENOUS ONCE
Status: COMPLETED | OUTPATIENT
Start: 2021-06-03 | End: 2021-06-03

## 2021-06-03 RX ADMIN — FENTANYL CITRATE 50 MCG: 50 INJECTION, SOLUTION INTRAMUSCULAR; INTRAVENOUS at 09:29

## 2021-06-03 RX ADMIN — KETOROLAC TROMETHAMINE 15 MG: 30 INJECTION, SOLUTION INTRAMUSCULAR; INTRAVENOUS at 08:25

## 2021-06-03 NOTE — ED PROVIDER NOTES
History  Chief Complaint   Patient presents with    Pain     patient reports pain to right arm from elbow to hand with swelling to right hand  also reporting burning pain to legs, right jaw pain  Patient is a 66-year-old female a past medical history of psoriasis, psoriatic arthritis, lymphedema of the legs who presents with right arm pain for few days  Patient reports a diffuse aching pain throughout her entire right arm that is worse with movement and touching, better with rest   She has been taking her home medications and Tylenol, with little relief of symptoms  She also notes mild swelling of the entire extremity  She denies any numbness, tingling, weakness of the arm, erythema, lymphatic streaking, purulent drainage, neck pain, radiation of the pain from the neck down the arm, chest pain, shortness of breath, palpitations  She also notes her chronic leg pain, which she states is unchanged  She denies any increase in pain, increased leg swelling, erythema, lymphatic streaking, numbness, tingling, weakness of the legs, saddle anesthesia, loss of bowel or bladder function  Patient also notes pain on the left side of her jaw just anterior to the left ear that is worse with opening and closing her mouth, better with rest   She denies any dental pain, facial swelling, ear pain, drainage from the ear, stridor, drooling, difficulty opening or closing the mouth  She has been able to eat and drink without issue  Patient states she is otherwise in her usual state of health and denies any fevers, chills, diaphoresis, headaches, dizziness, lightheadedness, neck pain or stiffness, congestion, cough, abdominal pain, nausea, vomiting, diarrhea, urinary changes  Patient denies any change in her psoriasis  Patient is not currently on here because she believes that did not work, she stopped it several weeks ago  Prior to Admission Medications   Prescriptions Last Dose Informant Patient Reported? Taking? albuterol (PROVENTIL HFA,VENTOLIN HFA) 90 mcg/act inhaler  Self Yes Yes   Sig: Inhale 1 puff every 6 (six) hours as needed   budesonide-formoterol (Symbicort) 80-4 5 MCG/ACT inhaler  Self Yes Yes   Sig: Inhale 2 puffs 2 (two) times a day   loratadine (CLARITIN) 10 mg tablet   Yes Yes   Sig: Take 10 mg by mouth 2 (two) times a day   mupirocin (BACTROBAN) 2 % ointment   No Yes   Sig: Apply topically 3 (three) times a day   omeprazole (PRILOSEC) 20 mg delayed release capsule  Self Yes Yes   Sig: Take 20 mg by mouth every morning    oxyCODONE-acetaminophen (PERCOCET) 5-325 mg per tablet 6/3/2021 at 0200  No Yes   Sig: Take 1 tablet by mouth every 6 (six) hours as needed for moderate painMax Daily Amount: 4 tablets      Facility-Administered Medications: None       Past Medical History:   Diagnosis Date    Anxiety     Arthritis     Asthma 01/29/2020    exacerbation and is on  steroids    Chronic pain disorder     Clotting disorder (HCC)     GERD (gastroesophageal reflux disease)     History of transfusion 2000    Pneumonia 2011    Psoriasis     Psoriatic arthritis ([facility] [address] ) 1995    Pulmonary emboli ([facility] [address] ) 1995    post humeral frac       Past Surgical History:   Procedure Laterality Date    ABDOMINAL SURGERY      CHOLECYSTECTOMY      COLON SURGERY      COLONOSCOPY      FACIAL/NECK BIOPSY N/A 7/17/2020    Procedure: NECK EXPLORATION;  Surgeon: Ritu Morris DO;  Location: [facility];  Service: ENT    FOREIGN BODY REMOVAL N/A 6/4/2021    Procedure: Removal of suture granuloma abdomen;  Surgeon: Neyda Bhakta MD;  Location: [address] [place] [address] MAIN OR;  Service: General    FRACTURE SURGERY  1995    humeral frac repair    GASTRIC BYPASS OPEN  2006    HERNIA REPAIR      X3    INCISION AND DRAINAGE ANTERIOR NECK Right 6/16/2020    Procedure: INCISION AND DRAINAGE  (I&D) NECK;  Surgeon: Ritu Morris DO;  [facility];  Service: ENT    IVC FILTER INSERTION  1995    radha filter    PANNICULECTOMY      SC EXCISION SUBMAXILLARY GLAND Right 2020    Procedure: EXCISION SUBMANDIBULAR GLAND;  Surgeon: Andreas Marmolejo DO;  Location: [facility];  Service: ENT    MN EXPLORE WOUND,ABDOMEN/FLANK/BACK N/A 2020    Procedure: exploation of abdominal wound removal suture granulomas and foreign body;  Surgeon: Pamela Verdugo MD;  Location: [facility] MAIN OR;  Service: General    MN EXPLORE WOUND,ABDOMEN/FLANK/BACK N/A 3/2/2020    Procedure: EXPLORE WOUND OF BACK LEFT OPEN WITH PACKING;  Surgeon: Pamela Verdugo MD;  Location: [facility] MAIN OR;  Service: General    SMALL INTESTINE SURGERY      VENA CAVA FILTER PLACEMENT         Family History   Problem Relation Age of Onset    Heart block Mother     Diabetes Mother     No Known Problems Father      I have reviewed and agree with the history as documented  E-Cigarette/Vaping    E-Cigarette Use Never User      E-Cigarette/Vaping Substances    Nicotine No     THC No     CBD No     Flavoring No     Other No     Unknown No      Social History     Tobacco Use    Smoking status: Former Smoker     Packs/day: 0 50     Types: Cigarettes     Quit date: 2021     Years since quittin 2    Smokeless tobacco: Never Used   Substance Use Topics    Alcohol use: Not Currently    Drug use: Never       Review of Systems   Constitutional: Negative for chills, diaphoresis and fever  HENT: Negative for congestion, dental problem, ear pain, facial swelling, mouth sores, rhinorrhea, sinus pressure and sore throat  Left jaw pain   Eyes: Negative for visual disturbance  Respiratory: Negative for cough, shortness of breath, wheezing and stridor  Cardiovascular: Positive for leg swelling (Baseline)  Negative for chest pain and palpitations  Gastrointestinal: Negative for abdominal pain, diarrhea, nausea and vomiting  Genitourinary: Negative for difficulty urinating, dysuria, frequency, hematuria and urgency     Musculoskeletal: Negative for myalgias, neck pain and neck stiffness  Right arm pain and swelling, bilateral leg pain  Skin: Negative for color change, pallor and rash  Neurological: Negative for dizziness, weakness, light-headedness, numbness and headaches  Psychiatric/Behavioral: The patient is nervous/anxious  All other systems reviewed and are negative  Physical Exam  Physical Exam  Vitals signs and nursing note reviewed  Constitutional:       General: She is awake  She is not in acute distress  Appearance: She is well-developed  She is not toxic-appearing or diaphoretic  HENT:      Head: Normocephalic and atraumatic  Jaw: Tenderness and pain on movement present  No trismus, swelling or malocclusion  Comments: Patient with tenderness to palpation just anterior to the left ear, pain worsened with opening and closing  No padmini deformity palpated  Possible mild clicking  Consistent with TMJ     Right Ear: Hearing, tympanic membrane, ear canal and external ear normal  No mastoid tenderness  Left Ear: Hearing, tympanic membrane, ear canal and external ear normal  No mastoid tenderness  Nose: Nose normal       Mouth/Throat:      Mouth: Mucous membranes are moist  No oral lesions  Dentition: No dental tenderness or dental abscesses  Pharynx: Oropharynx is clear  Uvula midline  No pharyngeal swelling, oropharyngeal exudate, posterior oropharyngeal erythema or uvula swelling  Tonsils: No tonsillar exudate or tonsillar abscesses  Eyes:      Extraocular Movements: Extraocular movements intact  Conjunctiva/sclera: Conjunctivae normal       Pupils: Pupils are equal, round, and reactive to light  Neck:      Musculoskeletal: Normal range of motion and neck supple  Cardiovascular:      Rate and Rhythm: Normal rate and regular rhythm  Pulses: Normal pulses  Radial pulses are 2+ on the right side and 2+ on the left side          Posterior tibial pulses are 2+ on the right side and 2+ on the left side       Heart sounds: Normal heart sounds, S1 normal and S2 normal    Pulmonary:      Effort: Pulmonary effort is normal  No respiratory distress  Breath sounds: Normal breath sounds  No stridor  No decreased breath sounds or wheezing  Abdominal:      General: Bowel sounds are normal  There is no distension  Palpations: Abdomen is soft  Tenderness: There is no abdominal tenderness  Musculoskeletal:      Right shoulder: She exhibits decreased range of motion, tenderness and pain  She exhibits no bony tenderness, no swelling, no effusion, no crepitus, no deformity, no laceration, normal pulse and normal strength  Left shoulder: Normal       Right elbow: She exhibits normal range of motion, no swelling, no effusion, no deformity and no laceration  Tenderness found  Right wrist: She exhibits tenderness  She exhibits normal range of motion, no bony tenderness, no swelling, no effusion, no crepitus, no deformity and no laceration  Cervical back: Normal       Thoracic back: Normal       Right upper arm: She exhibits tenderness  She exhibits no bony tenderness, no swelling, no edema, no deformity and no laceration  Right forearm: She exhibits tenderness and swelling  She exhibits no bony tenderness, no deformity and no laceration  Right hand: She exhibits tenderness and swelling  She exhibits normal range of motion, no bony tenderness, normal capillary refill, no deformity and no laceration  Normal sensation noted  Normal strength noted  Right lower leg: Edema present  Left lower leg: Edema present  Comments: Patient with baseline weakness of left upper extremity  Neurovascularly intact in bilateral upper and lower extremities  Patient able to ambulate without issue  Baseline bilateral lower extremity edema with no erythema, crepitus, lymphatic streaking, palpable cord noted  Patient with diffuse tenderness of the right arm with no point tenderness    When distracted, patient does not indicate pain with blood pressure cuff engaged  5/5 strength in the right upper extremity with repeated encouragement  Trace swelling noted in the right arm, compared to left, difficult to compare due to baseline deficit on the left  Lymphadenopathy:      Cervical: No cervical adenopathy  Skin:     General: Skin is warm and dry  Capillary Refill: Capillary refill takes less than 2 seconds  Comments: Patient with extensive psoriatic lesions over the entire body  No acute areas of infection noted  Some evidence of excoriation, not infected on the right lower extremity   Neurological:      General: No focal deficit present  Mental Status: She is alert and oriented to person, place, and time  GCS: GCS eye subscore is 4  GCS verbal subscore is 5  GCS motor subscore is 6  Psychiatric:         Mood and Affect: Mood is anxious  Affect is tearful  Behavior: Behavior is cooperative           Vital Signs  ED Triage Vitals [06/03/21 0712]   Temperature Pulse Respirations Blood Pressure SpO2   98 4 °F (36 9 °C) 74 19 (!) 236/99 98 %      Temp Source Heart Rate Source Patient Position - Orthostatic VS BP Location FiO2 (%)   Oral Monitor -- Right arm --      Pain Score       Worst Possible Pain           Vitals:    06/03/21 0908 06/03/21 0915 06/03/21 1012 06/03/21 1013   BP: (!) 200/88 (!) 189/86  (!) 172/76   Pulse: 60 56 63          Visual Acuity      ED Medications  Medications   ketorolac (TORADOL) injection 15 mg (15 mg Intravenous Given 6/3/21 0825)   fentanyl citrate (PF) 100 MCG/2ML 50 mcg (50 mcg Intravenous Given 6/3/21 0929)       Diagnostic Studies  Results Reviewed     Procedure Component Value Units Date/Time    UA (URINE) with reflex to Scope [[id number]]  (Abnormal) Collected: 06/03/21 1035    Lab Status: Final result Specimen: Urine, Other Updated: 06/03/21 1102     Color, UA Yellow     Clarity, UA Clear     Specific Gravity, UA >=1 030     pH, UA 5 5     Leukocytes, UA Negative     Nitrite, UA Negative     Protein, UA Negative mg/dl      Glucose, UA Negative mg/dl      Ketones, UA Negative mg/dl      Urobilinogen, UA 1 0 E U /dl      Bilirubin, UA Interference- unable to analyze     Blood, UA Negative    Troponin I [[id number]]  (Normal) Collected: 06/03/21 0719    Lab Status: Final result Specimen: Blood from Arm, Right Updated: 06/03/21 0856     Troponin I <0 02 ng/mL     Comprehensive metabolic panel [[id number]]  (Abnormal) Collected: 06/03/21 0719    Lab Status: Final result Specimen: Blood from Arm, Right Updated: 06/03/21 0854     Sodium 142 mmol/L      Potassium 4 2 mmol/L      Chloride 105 mmol/L      CO2 27 mmol/L      ANION GAP 10 mmol/L      BUN 11 mg/dL      Creatinine 0 62 mg/dL      Glucose 105 mg/dL      Calcium 8 8 mg/dL      Corrected Calcium 9 5 mg/dL      AST 21 U/L      ALT 37 U/L      Alkaline Phosphatase 163 U/L      Total Protein 7 2 g/dL      Albumin 3 1 g/dL      Total Bilirubin 0 79 mg/dL      eGFR 102 ml/min/1 73sq m     Narrative:      [facility] guidelines for Chronic Kidney Disease (CKD):     Stage 1 with normal or high GFR (GFR > 90 mL/min/1 73 square meters)    Stage 2 Mild CKD (GFR = 60-89 mL/min/1 73 square meters)    Stage 3A Moderate CKD (GFR = 45-59 mL/min/1 73 square meters)    Stage 3B Moderate CKD (GFR = 30-44 mL/min/1 73 square meters)    Stage 4 Severe CKD (GFR = 15-29 mL/min/1 73 square meters)    Stage 5 End Stage CKD (GFR <15 mL/min/1 73 square meters)  Note: GFR calculation is accurate only with a steady state creatinine    CBC and differential [[id number]]  (Abnormal) Collected: 06/03/21 0719    Lab Status: Final result Specimen: Blood from Arm, Right Updated: 06/03/21 0843     WBC 4 23 Thousand/uL      RBC 4 41 Million/uL      Hemoglobin 13 6 g/dL      Hematocrit 41 6 %      MCV 94 fL      MCH 30 8 pg      MCHC 32 7 g/dL      RDW 13 9 %      MPV 10 9 fL      Platelets 023 Thousands/uL      nRBC 0 /100 WBCs      Neutrophils Relative 59 %      Immat GRANS % 0 %      Lymphocytes Relative 26 %      Monocytes Relative 9 %      Eosinophils Relative 5 %      Basophils Relative 1 %      Neutrophils Absolute 2 52 Thousands/µL      Immature Grans Absolute 0 01 Thousand/uL      Lymphocytes Absolute 1 09 Thousands/µL      Monocytes Absolute 0 38 Thousand/µL      Eosinophils Absolute 0 20 Thousand/µL      Basophils Absolute 0 03 Thousands/µL                  VAS upper limb venous duplex scan, unilateral/limited   Final Result by Casandra Parker MD (06/03 1244)                 Procedures  ECG 12 Lead Documentation Only    Date/Time: 6/3/2021 8:40 AM  Performed by: Sunil Guthrie PA-C  Authorized by: Sunil Guthrie PA-C     Indications / Diagnosis:  Arm pain  ECG reviewed by me, the ED Provider: yes    Patient location:  ED  Previous ECG:     Previous ECG:  Compared to current    Comparison ECG info:  30Jan2020  Rate:     ECG rate:  53    ECG rate assessment: bradycardic    Rhythm:     Rhythm: sinus rhythm    Ectopy:     Ectopy: none    QRS:     QRS axis:  Normal  Conduction:     Conduction: normal    ST segments:     ST segments:  Normal  T waves:     T waves: normal               ED Course  ED Course as of Jun 08 1614   Thu Jun 03, 2021   0912 Troponin I: <0 02   0914 Verbal report from VAS- negative for DVT      1016 UA ordered for pre-op clearance tomorrow                                SBIRT 22yo+      Most Recent Value   SBIRT (25 yo +)   In order to provide better care to our patients, we are screening all of our patients for alcohol and drug use  Would it be okay to ask you these screening questions? Yes Filed at: 06/03/2021 0084   Initial Alcohol Screen: US AUDIT-C    1  How often do you have a drink containing alcohol?  0 Filed at: 06/03/2021 0718   2  How many drinks containing alcohol do you have on a typical day you are drinking? 0 Filed at: 06/03/2021 0718   3a   Male UNDER 65: How often do you have five or more drinks on one occasion? 0 Filed at: 06/03/2021 0718   3b  FEMALE Any Age, or MALE 65+: How often do you have 4 or more drinks on one occassion? 0 Filed at: 06/03/2021 0718   Audit-C Score  0 Filed at: 06/03/2021 8535   MELLO: How many times in the past year have you    Used an illegal drug or used a prescription medication for non-medical reasons? Never Filed at: 06/03/2021 0718                    [facility]  Number of Diagnoses or Management Options  Chronic pain of both lower extremities:   Lymphedema of both lower extremities:   Psoriasis:   Right arm pain:   Diagnosis management comments: Reviewed all results with patient, answered questions  She noted improvement of symptoms  Patient requested completion of remainder preop workup  Reviewed medication education, treatment at home, appropriate precautions  Patient also requesting information for [facility] providers that she wishes to change her rheumatologist and healthcare providers from her current providers  Patient provided with information  Recommended follow-up with rheumatologist as soon as possible for further evaluation and monitoring of symptoms  Recommended follow-up with PCP for monitoring of symptoms  The management plan was discussed in detail with the patient at bedside and all questions were answered  Provided both verbal and written instructions  Reviewed red flag symptoms and strict return to ED instructions  Patient notes understanding and agrees to plan        Disposition  Final diagnoses:   Right arm pain   Chronic pain of both lower extremities   Lymphedema of both lower extremities   Psoriasis     Time reflects when diagnosis was documented in both MDM as applicable and the Disposition within this note     Time User Action Codes Description Comment    6/3/2021  9:56 AM Woo Aguiar Add [M79 601] Right arm pain     6/3/2021  9:56 AM Woo Aguiar Add [M79 604,  M79 605,  G89 29] Chronic pain of both lower extremities     6/3/2021  9:57 AM Costlow, Connie Arlette Add [I89 0] Lymphedema of both lower extremities     6/3/2021  9:58 AM Avinash Jiang, Manila Arlette Add [L40 9] Psoriasis       ED Disposition     ED Disposition Condition Date/Time Comment    Discharge Stable Thu Taco 3, 2021 10:00 AM Tree Nobles discharge to home/self care              Follow-up Information     Follow up With Specialties Details Why [address] [facility] Emergency Department Emergency Medicine  If symptoms worsen [facility] [id number]  [address] [facility] Emergency Department, [address] [facility] , [place], [place], [address] Schedule an appointment as soon as possible for a visit   [facility] [id number]  [address] [facility], [address] [facility], [place], [place], [address] [facility] Schedule an appointment as soon as possible for a visit   [address] [facility], [address], [address] [place], [place], [address] [phone]          Discharge Medication List as of 6/3/2021 10:51 AM      START taking these medications    Details   traMADol (ULTRAM) 50 mg tablet Take 1 tablet (50 mg total) by mouth every 6 (six) hours as needed for severe pain for up to 10 days, Starting u 6/3/2021, Until Sun 6/13/2021, Normal         CONTINUE these medications which have NOT CHANGED    Details   albuterol (PROVENTIL HFA,VENTOLIN HFA) 90 mcg/act inhaler Inhale 1 puff every 6 (six) hours as needed, Starting Thu 1/7/2021, Until Fri 1/7/2022, Historical Med      budesonide-formoterol (Symbicort) 80-4 5 MCG/ACT inhaler Inhale 2 puffs 2 (two) times a day, Starting Wed 8/12/2020, Historical Med      loratadine (CLARITIN) 10 mg tablet Take 10 mg by mouth 2 (two) times a day, Historical Med      mupirocin (BACTROBAN) 2 % ointment Apply topically 3 (three) times a day, Starting Tue 5/25/2021, Normal      omeprazole (PRILOSEC) 20 mg delayed release capsule Take 20 mg by mouth every morning , Historical Med      oxyCODONE-acetaminophen (PERCOCET) 5-325 mg per tablet Take 1 tablet by mouth every 6 (six) hours as needed for moderate painMax Daily Amount: 4 tablets, Starting Tue 5/25/2021, Normal           No discharge procedures on file      PDMP Review       Value Time User    PDMP Reviewed  Yes 6/3/2021 10:03 AM Melodie Yeung PA-C          ED Provider  Electronically Signed by           Melodie Yeung PA-C  06/08/21 4710

## 2021-06-03 NOTE — ED NOTES
VASCULAR TECH AT [facility], [address] [place] [address]  06/03/21 6947

## 2021-06-03 NOTE — DISCHARGE INSTRUCTIONS
Take Ultram as prescribed as needed for severe pain  Continue pain management at home as previously prescribed  Follow-up with Rheumatology for further evaluation of symptoms  Follow-up with PCP for monitoring of symptoms  Return to ED if symptoms worsen including increasing pain, numbness, tingling, weakness of the arm, swelling, chest pain, difficulty breathing, fevers

## 2021-06-04 ENCOUNTER — ANESTHESIA (OUTPATIENT)
Dept: PERIOP | Facility: HOSPITAL | Age: 56
End: 2021-06-04
Payer: COMMERCIAL

## 2021-06-04 ENCOUNTER — HOSPITAL ENCOUNTER (OUTPATIENT)
Facility: HOSPITAL | Age: 56
Setting detail: OUTPATIENT SURGERY
Discharge: HOME | End: 2021-06-04
Attending: SPECIALIST | Admitting: SPECIALIST
Payer: COMMERCIAL

## 2021-06-04 VITALS
HEART RATE: 53 BPM | TEMPERATURE: 97.8 F | WEIGHT: 293 LBS | SYSTOLIC BLOOD PRESSURE: 183 MMHG | HEIGHT: 66 IN | DIASTOLIC BLOOD PRESSURE: 85 MMHG | RESPIRATION RATE: 18 BRPM | OXYGEN SATURATION: 99 % | BODY MASS INDEX: 47.09 KG/M2

## 2021-06-04 DIAGNOSIS — T81.89XD SUTURE GRANULOMA, SUBSEQUENT ENCOUNTER: Primary | ICD-10-CM

## 2021-06-04 PROCEDURE — 10120 INC&RMVL FB SUBQ TISS SMPL: CPT | Performed by: SPECIALIST

## 2021-06-04 RX ORDER — CLINDAMYCIN PHOSPHATE 600 MG/50ML
600 INJECTION INTRAVENOUS ONCE
Status: COMPLETED | OUTPATIENT
Start: 2021-06-04 | End: 2021-06-04

## 2021-06-04 RX ORDER — PROPOFOL 10 MG/ML
INJECTION, EMULSION INTRAVENOUS AS NEEDED
Status: DISCONTINUED | OUTPATIENT
Start: 2021-06-04 | End: 2021-06-04

## 2021-06-04 RX ORDER — FENTANYL CITRATE/PF 50 MCG/ML
12.5 SYRINGE (ML) INJECTION
Status: DISCONTINUED | OUTPATIENT
Start: 2021-06-04 | End: 2021-06-04 | Stop reason: HOSPADM

## 2021-06-04 RX ORDER — OXYCODONE HYDROCHLORIDE AND ACETAMINOPHEN 5; 325 MG/1; MG/1
1 TABLET ORAL EVERY 6 HOURS PRN
Qty: 20 TABLET | Refills: 0 | Status: SHIPPED | OUTPATIENT
Start: 2021-06-04 | End: 2021-06-14

## 2021-06-04 RX ORDER — DIPHENHYDRAMINE HYDROCHLORIDE 50 MG/ML
12.5 INJECTION INTRAMUSCULAR; INTRAVENOUS ONCE AS NEEDED
Status: DISCONTINUED | OUTPATIENT
Start: 2021-06-04 | End: 2021-06-04 | Stop reason: HOSPADM

## 2021-06-04 RX ORDER — SODIUM CHLORIDE, SODIUM LACTATE, POTASSIUM CHLORIDE, CALCIUM CHLORIDE 600; 310; 30; 20 MG/100ML; MG/100ML; MG/100ML; MG/100ML
75 INJECTION, SOLUTION INTRAVENOUS CONTINUOUS
Status: DISCONTINUED | OUTPATIENT
Start: 2021-06-04 | End: 2021-06-04 | Stop reason: HOSPADM

## 2021-06-04 RX ORDER — PROMETHAZINE HYDROCHLORIDE 25 MG/ML
12.5 INJECTION, SOLUTION INTRAMUSCULAR; INTRAVENOUS ONCE AS NEEDED
Status: DISCONTINUED | OUTPATIENT
Start: 2021-06-04 | End: 2021-06-04 | Stop reason: HOSPADM

## 2021-06-04 RX ORDER — FENTANYL CITRATE 50 UG/ML
INJECTION, SOLUTION INTRAMUSCULAR; INTRAVENOUS AS NEEDED
Status: DISCONTINUED | OUTPATIENT
Start: 2021-06-04 | End: 2021-06-04

## 2021-06-04 RX ORDER — OXYCODONE HYDROCHLORIDE AND ACETAMINOPHEN 5; 325 MG/1; MG/1
2 TABLET ORAL EVERY 4 HOURS PRN
Status: DISCONTINUED | OUTPATIENT
Start: 2021-06-04 | End: 2021-06-04 | Stop reason: HOSPADM

## 2021-06-04 RX ORDER — OXYCODONE HYDROCHLORIDE AND ACETAMINOPHEN 5; 325 MG/1; MG/1
2 TABLET ORAL ONCE
Status: COMPLETED | OUTPATIENT
Start: 2021-06-04 | End: 2021-06-04

## 2021-06-04 RX ORDER — MIDAZOLAM HYDROCHLORIDE 2 MG/2ML
INJECTION, SOLUTION INTRAMUSCULAR; INTRAVENOUS AS NEEDED
Status: DISCONTINUED | OUTPATIENT
Start: 2021-06-04 | End: 2021-06-04

## 2021-06-04 RX ORDER — ONDANSETRON 2 MG/ML
4 INJECTION INTRAMUSCULAR; INTRAVENOUS EVERY 8 HOURS PRN
Status: DISCONTINUED | OUTPATIENT
Start: 2021-06-04 | End: 2021-06-04 | Stop reason: HOSPADM

## 2021-06-04 RX ORDER — DEXAMETHASONE SODIUM PHOSPHATE 4 MG/ML
4 INJECTION, SOLUTION INTRA-ARTICULAR; INTRALESIONAL; INTRAMUSCULAR; INTRAVENOUS; SOFT TISSUE ONCE AS NEEDED
Status: DISCONTINUED | OUTPATIENT
Start: 2021-06-04 | End: 2021-06-04 | Stop reason: HOSPADM

## 2021-06-04 RX ORDER — SODIUM CHLORIDE, SODIUM LACTATE, POTASSIUM CHLORIDE, CALCIUM CHLORIDE 600; 310; 30; 20 MG/100ML; MG/100ML; MG/100ML; MG/100ML
INJECTION, SOLUTION INTRAVENOUS CONTINUOUS PRN
Status: DISCONTINUED | OUTPATIENT
Start: 2021-06-04 | End: 2021-06-04

## 2021-06-04 RX ORDER — OXYCODONE HYDROCHLORIDE AND ACETAMINOPHEN 5; 325 MG/1; MG/1
1 TABLET ORAL EVERY 4 HOURS PRN
Status: DISCONTINUED | OUTPATIENT
Start: 2021-06-04 | End: 2021-06-04 | Stop reason: HOSPADM

## 2021-06-04 RX ORDER — FENTANYL CITRATE/PF 50 MCG/ML
25 SYRINGE (ML) INJECTION
Status: COMPLETED | OUTPATIENT
Start: 2021-06-04 | End: 2021-06-04

## 2021-06-04 RX ORDER — PROPOFOL 10 MG/ML
INJECTION, EMULSION INTRAVENOUS CONTINUOUS PRN
Status: DISCONTINUED | OUTPATIENT
Start: 2021-06-04 | End: 2021-06-04

## 2021-06-04 RX ORDER — LIDOCAINE HYDROCHLORIDE 10 MG/ML
INJECTION, SOLUTION EPIDURAL; INFILTRATION; INTRACAUDAL; PERINEURAL AS NEEDED
Status: DISCONTINUED | OUTPATIENT
Start: 2021-06-04 | End: 2021-06-04 | Stop reason: HOSPADM

## 2021-06-04 RX ORDER — KETOROLAC TROMETHAMINE 30 MG/ML
30 INJECTION, SOLUTION INTRAMUSCULAR; INTRAVENOUS ONCE
Status: COMPLETED | OUTPATIENT
Start: 2021-06-04 | End: 2021-06-04

## 2021-06-04 RX ORDER — MAGNESIUM HYDROXIDE 1200 MG/15ML
LIQUID ORAL AS NEEDED
Status: DISCONTINUED | OUTPATIENT
Start: 2021-06-04 | End: 2021-06-04 | Stop reason: HOSPADM

## 2021-06-04 RX ORDER — MEPERIDINE HYDROCHLORIDE 25 MG/ML
12.5 INJECTION INTRAMUSCULAR; INTRAVENOUS; SUBCUTANEOUS
Status: DISCONTINUED | OUTPATIENT
Start: 2021-06-04 | End: 2021-06-04 | Stop reason: HOSPADM

## 2021-06-04 RX ADMIN — SODIUM CHLORIDE, SODIUM LACTATE, POTASSIUM CHLORIDE, AND CALCIUM CHLORIDE: .6; .31; .03; .02 INJECTION, SOLUTION INTRAVENOUS at 09:34

## 2021-06-04 RX ADMIN — CLINDAMYCIN IN 5 PERCENT DEXTROSE 600 MG: 12 INJECTION, SOLUTION INTRAVENOUS at 09:34

## 2021-06-04 RX ADMIN — OXYCODONE HYDROCHLORIDE AND ACETAMINOPHEN 2 TABLET: 5; 325 TABLET ORAL at 12:17

## 2021-06-04 RX ADMIN — MIDAZOLAM 2 MG: 1 INJECTION INTRAMUSCULAR; INTRAVENOUS at 09:34

## 2021-06-04 RX ADMIN — KETOROLAC TROMETHAMINE 30 MG: 30 INJECTION, SOLUTION INTRAMUSCULAR; INTRAVENOUS at 10:47

## 2021-06-04 RX ADMIN — FENTANYL CITRATE 50 MCG: 50 INJECTION, SOLUTION INTRAMUSCULAR; INTRAVENOUS at 09:41

## 2021-06-04 RX ADMIN — FENTANYL CITRATE 25 MCG: 50 INJECTION, SOLUTION INTRAMUSCULAR; INTRAVENOUS at 10:33

## 2021-06-04 RX ADMIN — FENTANYL CITRATE 25 MCG: 50 INJECTION, SOLUTION INTRAMUSCULAR; INTRAVENOUS at 10:48

## 2021-06-04 RX ADMIN — FENTANYL CITRATE 25 MCG: 50 INJECTION, SOLUTION INTRAMUSCULAR; INTRAVENOUS at 10:38

## 2021-06-04 RX ADMIN — PROPOFOL 50 MG: 10 INJECTION, EMULSION INTRAVENOUS at 09:54

## 2021-06-04 RX ADMIN — FENTANYL CITRATE 25 MCG: 50 INJECTION, SOLUTION INTRAMUSCULAR; INTRAVENOUS at 09:52

## 2021-06-04 RX ADMIN — FENTANYL CITRATE 100 MCG: 50 INJECTION, SOLUTION INTRAMUSCULAR; INTRAVENOUS at 10:25

## 2021-06-04 RX ADMIN — FENTANYL CITRATE 25 MCG: 50 INJECTION, SOLUTION INTRAMUSCULAR; INTRAVENOUS at 10:43

## 2021-06-04 RX ADMIN — FENTANYL CITRATE 25 MCG: 50 INJECTION, SOLUTION INTRAMUSCULAR; INTRAVENOUS at 09:57

## 2021-06-04 RX ADMIN — PROPOFOL 110 MCG/KG/MIN: 10 INJECTION, EMULSION INTRAVENOUS at 09:41

## 2021-06-04 NOTE — ANESTHESIA POSTPROCEDURE EVALUATION
Post-Op Assessment Note    CV Status:  Stable    Pain management: adequate     Mental Status:  Alert and awake   Hydration Status:  Euvolemic   PONV Controlled:  Controlled   Airway Patency:  Patent      Post Op Vitals Reviewed: Yes      Staff: CRNA         No complications documented      /77 (06/04/21 1027)    Temp 98 7 °F (37 1 °C) (06/04/21 1027)    Pulse 72 (06/04/21 1027)   Resp 20 (06/04/21 1027)    SpO2 91 % (06/04/21 1027)

## 2021-06-04 NOTE — OP NOTE
OPERATIVE REPORT  PATIENT NAME: Lili Ma    :  1965  MRN: [id number]  Pt Location:  OR ROOM 07    SURGERY DATE: 2021    Surgeon(s) and Role:     * Rene Lopez MD - Primary    Preop Diagnosis:  Suture granuloma [T81 89XA]    Post-Op Diagnosis Codes:     * Suture granuloma [T81 89XA]    Procedure(s) (LRB):  Removal of suture granuloma abdomen (N/A)    Specimen(s):  * No specimens in log *    Estimated Blood Loss:   Minimal    Drains:  * No LDAs found *    Anesthesia Type:   IV Sedation with Anesthesia    Operative Indications:  Suture granuloma [T81 89XA]      Operative Findings:  Suture granuloma        Complications:   None    Procedure and Technique:  Patient brought the operating room postop table supine position  Under adequate IV sedation the abdomen was prepped and draped in usual sterile fashion  Suture granuloma was at the superior aspect as the previously well-healed is midline incisions  The areas infiltrated with 1% lidocaine  15  Scalp blade was used make an incision directly over the area  The wound was explored no granuloma could be identified  The incision was extended proximally and once again 6 expiration did not yield the granuloma  Additional local was placed to the wound  Dissection carried deeper into the subcutaneous tissue and the suture granuloma was palpated  It was grasped with hemostats and excised  It appeared to be an old Prolene suture in the shape of the knot  The areas inspected for any additional granulomata and none were noted  The area was infiltrated with 1% lidocaine  Hemostasis was obtained with direct cautery  After adequate hemostasis the wound was closed with interrupted 3-0 Vicryl suture in the subcu and dermis  The skin closed with 3-0 nylon in vertical mattress fashion  Dry sterile dressings applied  The estimated blood loss minimal patient tolerated procedure well delivered to recovery room stable condition  Thanks     I was present for the entire procedure    Patient Disposition:  PACU     SIGNATURE: Fela Wong MD  DATE: June 4, 2021  TIME: 12:37 PM

## 2021-06-04 NOTE — NURSING NOTE
Pt returned to [facility] awake,alert,IV infusing, C/O 6/10 operative discomfort, Dr Abhilash Soriano notified for pain medication orders, taking po

## 2021-06-04 NOTE — INTERVAL H&P NOTE
H&P reviewed  After examining the patient I find no changes in the patients condition since the H&P had been written      Vitals:    06/04/21 0645   BP: (!) 185/93   Pulse: 73   Resp: 20   Temp: 98 6 °F (37 °C)   SpO2: 97%

## 2021-06-04 NOTE — ANESTHESIA POSTPROCEDURE EVALUATION
Post-Op Assessment Note    CV Status:  Stable  Pain Score: 1    Pain management: adequate     Mental Status:  Alert and awake   Hydration Status:  Euvolemic   PONV Controlled:  Controlled   Airway Patency:  Patent      Post Op Vitals Reviewed: Yes      Staff: Anesthesiologist, CRNA   Comments: No apnea in pacu         No complications documented      /77 (06/04/21 1027)    Temp 98 7 °F (37 1 °C) (06/04/21 1027)    Pulse 72 (06/04/21 1027)   Resp 20 (06/04/21 1027)    SpO2 91 % (06/04/21 1027)

## 2021-06-22 ENCOUNTER — OFFICE VISIT (OUTPATIENT)
Dept: SURGERY | Facility: CLINIC | Age: 56
End: 2021-06-22

## 2021-06-22 VITALS
WEIGHT: 293 LBS | SYSTOLIC BLOOD PRESSURE: 184 MMHG | DIASTOLIC BLOOD PRESSURE: 88 MMHG | TEMPERATURE: 97.6 F | BODY MASS INDEX: 47.09 KG/M2 | HEIGHT: 66 IN | HEART RATE: 107 BPM

## 2021-06-22 DIAGNOSIS — Z09 ENCOUNTER FOR EXAMINATION FOLLOWING SURGERY: ICD-10-CM

## 2021-06-22 PROCEDURE — 99024 POSTOP FOLLOW-UP VISIT: CPT | Performed by: SPECIALIST

## 2021-06-22 RX ORDER — PREDNISONE 10 MG/1
TABLET ORAL
COMMUNITY
Start: 2021-06-11 | End: 2021-11-12

## 2021-06-22 RX ORDER — GABAPENTIN 300 MG/1
300 CAPSULE ORAL 2 TIMES DAILY
COMMUNITY
Start: 2021-06-18 | End: 2021-07-18

## 2021-06-22 RX ORDER — OXYCODONE HYDROCHLORIDE AND ACETAMINOPHEN 5; 325 MG/1; MG/1
1 TABLET ORAL EVERY 6 HOURS PRN
Qty: 20 TABLET | Refills: 0 | Status: SHIPPED | OUTPATIENT
Start: 2021-06-22 | End: 2022-07-14 | Stop reason: ALTCHOICE

## 2021-07-08 NOTE — PROGRESS NOTES
Madhuri George presents today for postop visit status post excision of suture granuloma  She is teary-eyed and emotional    She was just discharged from [facility] after being there for 5 days  She had a psoriatic arthritic flare and was in severe pain  The Humira that she had been taking stop working  They started her on Cosentyx  Injected her Friday  Also placed on prednisone  The Cosentyx is supposed to be injected every Friday for 5 weeks  Today in the office she still has fairly generalized arthritic pain  She has 1 arm that is usable and she has pain in that  In regards to her surgery she is doing well  She says the wound is healing well  Physical exam:  Middle-aged white female awake alert no distress     Abdomen incision superior aspect of the abdomen is healing well  The sutures are DC G has excellent cosmetic result  There was no evidence of drainage or infection  Impression:  Doing well status post excision of recurrent suture granuloma  we talked to her for a fairly extended period of time to give her comfort and empathy  She has been a patient office for a long long time and we like her  Plan: At this point she is discharged from the office  Then we can do to help her of course she can call  Hopefully her current treatment regime will kick in and afford her some improvement

## 2021-07-12 ENCOUNTER — TELEPHONE (OUTPATIENT)
Dept: PSYCHIATRY | Facility: CLINIC | Age: 56
End: 2021-07-12

## 2021-07-12 NOTE — TELEPHONE ENCOUNTER
Pt called looking to set up w/ a psychiatrist  I informed pt  call ISHA Ritter to put in a referral in the sytem  Once in I will forward information to Intake   I told her of the wait time for an appt and she said she was going to look around because she needed something soon

## 2021-07-13 ENCOUNTER — OFFICE VISIT (OUTPATIENT)
Dept: SURGERY | Facility: CLINIC | Age: 56
End: 2021-07-13
Payer: COMMERCIAL

## 2021-07-13 VITALS — HEIGHT: 66 IN | HEART RATE: 71 BPM | TEMPERATURE: 98.2 F | WEIGHT: 293 LBS | BODY MASS INDEX: 47.09 KG/M2

## 2021-07-13 DIAGNOSIS — T81.89XD SUTURE GRANULOMA, SUBSEQUENT ENCOUNTER: Primary | ICD-10-CM

## 2021-07-13 DIAGNOSIS — E66.01 MORBID (SEVERE) OBESITY DUE TO EXCESS CALORIES (HCC): ICD-10-CM

## 2021-07-13 PROCEDURE — 99213 OFFICE O/P EST LOW 20 MIN: CPT | Performed by: SPECIALIST

## 2021-07-13 RX ORDER — LEVOFLOXACIN 500 MG/1
500 TABLET, FILM COATED ORAL EVERY 24 HOURS
Qty: 7 TABLET | Refills: 1 | Status: SHIPPED | OUTPATIENT
Start: 2021-07-13 | End: 2021-07-20

## 2021-07-13 NOTE — PROGRESS NOTES
Madhuri George presents today for 2nd follow-up visit status post removal of multiple suture granulomata from an old wound  She was seen a week ago and everything was fine the wound was healing  She has had the wound opened a little bit some clear pink drainage came out  No purulence  She happens to be on prednisone and Cosentyx and of course both of these will impact her healing  To call the office we brought her in just to check things out  Physical exam:  Middle-aged obese white female awake alert no distress  Abdomen obese soft there are psoriatic patches throughout the abdomen  In the incision in the upper abdomen there is a minute 2-3 mm opening at the inferior aspect  No pus is present  The wound is probed gently in search of another suture granuloma but this was aborted due to pain  Impression:  Minimal dehiscence of the skin  No infection  Plan:  Prophylactic antibiotics to prevent infection as she is immunocompromised  Local care  Dressings    Return to the office in 1 week to check healing

## 2021-07-28 ENCOUNTER — OFFICE VISIT (OUTPATIENT)
Dept: SURGERY | Facility: CLINIC | Age: 56
End: 2021-07-28
Payer: COMMERCIAL

## 2021-07-28 VITALS
WEIGHT: 293 LBS | DIASTOLIC BLOOD PRESSURE: 82 MMHG | BODY MASS INDEX: 47.09 KG/M2 | HEIGHT: 66 IN | SYSTOLIC BLOOD PRESSURE: 178 MMHG | TEMPERATURE: 97.4 F | HEART RATE: 75 BPM

## 2021-07-28 DIAGNOSIS — S31.109A OPEN WOUND OF ABDOMINAL WALL, INITIAL ENCOUNTER: ICD-10-CM

## 2021-07-28 DIAGNOSIS — S31.109D OPEN WOUND OF LATERAL ABDOMINAL WALL, SUBSEQUENT ENCOUNTER: Primary | ICD-10-CM

## 2021-07-28 PROCEDURE — 99213 OFFICE O/P EST LOW 20 MIN: CPT | Performed by: SPECIALIST

## 2021-07-28 RX ORDER — SULFAMETHOXAZOLE AND TRIMETHOPRIM 800; 160 MG/1; MG/1
1 TABLET ORAL EVERY 12 HOURS SCHEDULED
Status: DISCONTINUED | OUTPATIENT
Start: 2021-07-28 | End: 2021-07-28

## 2021-07-28 RX ORDER — SULFAMETHOXAZOLE AND TRIMETHOPRIM 800; 160 MG/1; MG/1
1 TABLET ORAL EVERY 12 HOURS SCHEDULED
Qty: 28 TABLET | Refills: 0 | Status: SHIPPED | OUTPATIENT
Start: 2021-07-28 | End: 2021-07-28

## 2021-07-28 RX ORDER — GABAPENTIN 300 MG/1
600 CAPSULE ORAL
COMMUNITY
Start: 2021-06-22 | End: 2021-11-12

## 2021-07-28 RX ORDER — SULFAMETHOXAZOLE AND TRIMETHOPRIM 800; 160 MG/1; MG/1
1 TABLET ORAL EVERY 12 HOURS SCHEDULED
Qty: 28 TABLET | Refills: 0 | Status: SHIPPED | OUTPATIENT
Start: 2021-07-28 | End: 2021-08-11

## 2021-07-28 RX ORDER — PREDNISONE 10 MG/1
40 TABLET ORAL DAILY
COMMUNITY
Start: 2021-07-26 | End: 2021-11-12

## 2021-07-28 RX ADMIN — SULFAMETHOXAZOLE AND TRIMETHOPRIM 1 TABLET: 800; 160 TABLET ORAL at 17:05

## 2021-07-28 NOTE — PROGRESS NOTES
Katya Iglesias was seen 2 weeks ago  At that time she was found have a small opening in her previous incision  She underwent removal of multiple suture granulomata from an old incision  The wound was healing well  Unfortunately her psoriatic arthritis spiked and she developed severe right shoulder elbow and wrist pain  She had been on how Humira for this but it stopped working  Her rheumatologist started to her on Cosentyx and also prednisone  Unfortunately her wound opened up a little bit  She was seen in the office no infection was noted but she was placed on prophylactic antibiotics  She was placed on Levaquin but unfortunately she was unable to tolerate the Levaquin dose on her current dose of prednisone  She stop taking it  Unfortunately her psoriatic arthritis did not improve  She called her rheumatologist who increased her prednisone  Also her midline wound is still slightly open with some clear pink drainage  No pus  She says she feels there might be an additional suture granuloma in there  Physical exam:  Middle-aged obese white female awake alert no distress     Abdomen obese midline incision is noted  The superior aspect there is a healing incision with a minute 2-3 mm opening  It is probed gently  No pus  No granuloma  It did not appear to go deep either  Impression:  Wound breakdown, quite superficial       Plan:  There is no acute infection  She is on fairly significant immunosuppressive drugs  This should negatively affect healing and also resistance to infection  We will once again started on empiric /prophylactic antibiotics  Bactrim will be ordered as she has tolerated that in the past   Will bring her back in 2 weeks

## 2021-08-10 ENCOUNTER — OFFICE VISIT (OUTPATIENT)
Dept: SURGERY | Facility: CLINIC | Age: 56
End: 2021-08-10
Payer: COMMERCIAL

## 2021-08-10 VITALS — WEIGHT: 293 LBS | TEMPERATURE: 98.1 F | BODY MASS INDEX: 47.09 KG/M2 | HEIGHT: 66 IN | HEART RATE: 74 BPM

## 2021-08-10 DIAGNOSIS — S31.109A OPEN ABDOMINAL WALL WOUND: Primary | ICD-10-CM

## 2021-08-10 PROCEDURE — 99213 OFFICE O/P EST LOW 20 MIN: CPT | Performed by: SPECIALIST

## 2021-08-10 RX ORDER — SECUKINUMAB 150 MG/ML
300 INJECTION SUBCUTANEOUS
COMMUNITY
Start: 2021-08-04 | End: 2021-11-12

## 2021-08-11 NOTE — PROGRESS NOTES
Yenymarvin Busby presents today for follow-up visit in regards to removal of suture granulomas  The wound had previously healed but then broke down  She had exacerbation of her psoriatic arthritis was placed on large doses of prednisone  She is also on Cosentyx  She was seen a few weeks ago and placed on prophylactic antibiotics  She presents today for follow-up visit  She says the wound drains clear fluid  It is also bulging out somewhat  Physical exam:  Middle-aged obese white female awake alert no distress     Abdomen obese soft nontender  Previous incision in the superior aspect of the abdomen is bulging out somewhat and has a small opening  It is probed no pus or suture granuloma is identified  Impression: Wound breakdown  She is currently on a decreasing dose of steroids and is being tapered off  She was originally on 40 mg and is currently on 20  She will day taper down to 10 and then none  Plan:  Return 2 weeks  Recheck wound  If no better schedule to explore the wound  It will obviously be better to do this when she is off steroids

## 2021-08-24 ENCOUNTER — OFFICE VISIT (OUTPATIENT)
Dept: SURGERY | Facility: CLINIC | Age: 56
End: 2021-08-24
Payer: COMMERCIAL

## 2021-08-24 VITALS — TEMPERATURE: 97 F

## 2021-08-24 DIAGNOSIS — T81.89XD SUTURE GRANULOMA, SUBSEQUENT ENCOUNTER: Primary | ICD-10-CM

## 2021-08-24 PROCEDURE — 99214 OFFICE O/P EST MOD 30 MIN: CPT | Performed by: SPECIALIST

## 2021-08-26 ENCOUNTER — PREP FOR PROCEDURE (OUTPATIENT)
Dept: SURGERY | Facility: CLINIC | Age: 56
End: 2021-08-26

## 2021-08-26 DIAGNOSIS — S31.109D OPEN WOUND OF ABDOMINAL WALL, SUBSEQUENT ENCOUNTER: Primary | ICD-10-CM

## 2021-08-26 DIAGNOSIS — Z48.02 ENCOUNTER FOR REMOVAL OF SUTURES: ICD-10-CM

## 2021-08-26 NOTE — H&P
Chief Complaint:  Nonhealing wound  ?  Recurrent suture granuloma      History of Present Illness:   Bud Montiel is a 41-year-old white female a patient of ours for many years  She has a long a colorful past medical history  In a nutshell she underwent open a Vicenta-en-Y gastric bypass at [facility] [place] in 2000  At that time she weighed almost 600 lb  Currently weighs about 290  She underwent a revision in 2006  She underwent tummy bertack 2007  Incisional hernia repaired at the same time  Subsequent incisional hernia repair again with mesh  Laparoscopic assisted repair of hernia and lysis of adhesions 2008 2015 she underwent removal of suture granuloma from her incision  Developed symptomatic gallstones underwent laparoscopic cholecystectomy 2016 and on and on and on  She was seen in the office in June for a swollen tender bulge at the superior aspect of her abdominal incision  Of course was felt to be another suture granuloma and she taken to the operating room and explored  Postoperatively she healed and was doing well  Unfortunately she has a long history of psoriatic arthritis and is on various biologics (Humira, Cosentyx )  Also she has been on steroids on and off over the years  After surgery she developed decompensation of her psoriatic arthritis she developed severe somewhat intractable pain primarily right shoulder elbow wrist fingers as well as a worsening rash  She was placed on heavy doses of steroids and Cosentyx  She eventually improved somewhat  Unfortunately her incision which appeared to have previously healed dehisced  It was treated conservatively in the office with local care, prophylactic antibiotics etcetera  Unfortunately it did not heal and appears to be protruding  Additional suture granuloma?   She is here to discuss the problem      Past Medical History:   Past Medical History:   Diagnosis Date    Anxiety     Arthritis     Asthma 01/29/2020 exacerbation and is on  steroids    Chronic pain disorder     Clotting disorder (HCC)     GERD (gastroesophageal reflux disease)     History of transfusion 2000    Pneumonia 2011    Psoriasis     Psoriatic arthritis ([facility] [address] ) 1995    Pulmonary emboli ([facility] [address] ) 1995    post humeral frac         Past Surgical History:    Past Surgical History:   Procedure Laterality Date    ABDOMINAL SURGERY      CHOLECYSTECTOMY      COLON SURGERY      COLONOSCOPY      FACIAL/NECK BIOPSY N/A 7/17/2020    Procedure: NECK EXPLORATION;  Surgeon: Rohit Brian DO;  Location: [facility];  Service: ENT   [address] [place] [address] N/A 6/4/2021    Procedure: Removal of suture granuloma abdomen;  Surgeon: Francesco Meyers MD;  Location: [address] [place] [address] OR;  Service: General    FRACTURE SURGERY  1995    humeral frac repair    GASTRIC BYPASS OPEN  2006    HERNIA REPAIR      X3    INCISION AND DRAINAGE ANTERIOR NECK Right 6/16/2020    Procedure: INCISION AND DRAINAGE  (I&D) NECK;  Surgeon: Rohit Brian DO;  [facility];  Service: ENT    IVC [employer]    radha filter    PANNICULECTOMY      FL EXCISION SUBMAXILLARY GLAND Right 6/12/2020    Procedure: EXCISION SUBMANDIBULAR GLAND;  Surgeon: Rohit Brian DO;  [facility];  Service: ENT    FL EXPLORE WOUND,ABDOMEN/FLANK/BACK N/A 1/31/2020    Procedure: exploation of abdominal wound removal suture granulomas and foreign body;  Surgeon: Francesco Meyers MD;  Location: [address] [place] [address] OR;  Service: General    FL EXPLORE WOUND,ABDOMEN/FLANK/BACK N/A 3/2/2020    Procedure: EXPLORE WOUND OF BACK LEFT OPEN WITH PACKING;  Surgeon: Francesco Meyers MD;  Location:  MAIN OR;  Service: General    SMALL INTESTINE SURGERY      VENA CAVA FILTER PLACEMENT           Allergies:     Allergies   Allergen Reactions    Penicillins Other (See Comments) and Anaphylaxis     "PARALYZES HER"  Was paralized    "PARALYZES HER"  Was paralized  "PARALYZES HER"  Was paralized  "PARALYZES HER"  Was paralyzed    Morphine Itching    Prednisone Abdominal Pain     Worsening psoriasis         Medications:    Current Outpatient Medications:     albuterol (PROVENTIL HFA,VENTOLIN HFA) 90 mcg/act inhaler, Inhale 1 puff every 6 (six) hours as needed, Disp: , Rfl:     budesonide-formoterol (Symbicort) 80-4 5 MCG/ACT inhaler, Inhale 2 puffs 2 (two) times a day, Disp: , Rfl:     gabapentin (NEURONTIN) 300 mg capsule, Take 600 mg by mouth, Disp: , Rfl:     loratadine (CLARITIN) 10 mg tablet, Take 10 mg by mouth 2 (two) times a day, Disp: , Rfl:     omeprazole (PRILOSEC) 20 mg delayed release capsule, Take 20 mg by mouth every morning , Disp: , Rfl:     oxyCODONE-acetaminophen (PERCOCET) 5-325 mg per tablet, Take 1 tablet by mouth every 6 (six) hours as needed for moderate painMax Daily Amount: 4 tablets, Disp: 20 tablet, Rfl: 0    predniSONE 10 mg tablet, 4 PO daily x 2 days; 3 PO daily x 5 days; 2 PO daily x 5 days; 1 PO daily x 5 days  , Disp: , Rfl:     predniSONE 10 mg tablet, Take 40 mg by mouth daily, Disp: , Rfl:     secukinumab (COSENTYX) 150 mg/mL SOAJ injection, Inject 300 mg under the skin every 28 days , Disp: , Rfl:     mupirocin (BACTROBAN) 2 % ointment, Apply topically 3 (three) times a day (Patient not taking: Reported on 2021), Disp: 30 g, Rfl: 0    secukinumab (Cosentyx Sensoready Pen) 150 mg/mL SOAJ injection, Inject 300 mg under the skin every 30 (thirty) days (Patient not taking: Reported on 8/10/2021), Disp: , Rfl:       Social History:  Social History     Social History     Substance and Sexual Activity   Alcohol Use Not Currently     Social History     Substance and Sexual Activity   Drug Use Never     Social History     Tobacco Use   Smoking Status Former Smoker    Packs/day: 0 50    Types: Cigarettes    Quit date: 2021    Years since quittin 5   Smokeless Tobacco Never Used         Family History:    Family History   Problem Relation Age of Onset    Heart block Mother  Diabetes Mother     No Known Problems Father          Review of Systems:     as per the HPI  Persistent right shoulder pain which has improved  Other arthralgias  Pain at the midline incision as per the HPI  No weight loss weight gain fever chills night sweats chest pain nausea vomiting diarrhea constipation shortness of breath headaches blurry vision double vision sore throat chronic cough etcetera  Other review of systems appear normal    Vitals:  Vitals:    08/24/21 1540   Temp: (!) 97 °F (36 1 °C)       Physical Exam:   middle-aged obese white female who is awake alert no distress    Vital signs as above  Skin she has diffuse generalized psoriatic rash erythematous  Chronic flaky skin  No drainage noted  Otherwise warm and dry  Head normocephalic and atraumatic  Eyes IVAN a m  intact  Ears nose within normal limits  Throat gag reflex intact   Neck no masses thyromegaly lymphadenopathy palpable  Back no CVA or spinal tenderness   Lungs clear to a and P   Cor regular rate and rhythm no murmurs carotid bruits   Abdomen obese multiple scars are noted from previous surgery  Midline incision demonstrates at the most superior aspect a protruding tender area  No obvious drainage at this time  This is in the area of the xiphoid process  No other abdominal masses or hernias noted  Once again of course she has diffuse psoriasis noted in the abdomen  Extremities bilateral lower extremity 1+ edema  Left upper extremity flaccid paralysis  Neurologically A&O x3 cranial nerves 2-12 intact  Lymphatics no lymphadenopathy palpable      Lab Results: I have personally reviewed pertinent reports  See below  Imaging: I have personally reviewed pertinent reports  EKG, Pathology, and Other Studies: I have personally reviewed pertinent reports  No visits with results within 1 Day(s) from this visit     Latest known visit with results is:   Admission on 06/03/2021, Discharged on 06/03/2021   Component Date Value    WBC 06/03/2021 4 23*    RBC 06/03/2021 4 41     Hemoglobin 06/03/2021 13 6     Hematocrit 06/03/2021 41 6     MCV 06/03/2021 94     MCH 06/03/2021 30 8     MCHC 06/03/2021 32 7     RDW 06/03/2021 13 9     MPV 06/03/2021 10 9     Platelets 30/34/5868 213     nRBC 06/03/2021 0     Neutrophils Relative 06/03/2021 59     Immat GRANS % 06/03/2021 0     Lymphocytes Relative 06/03/2021 26     Monocytes Relative 06/03/2021 9     Eosinophils Relative 06/03/2021 5     Basophils Relative 06/03/2021 1     Neutrophils Absolute 06/03/2021 2 52     Immature Grans Absolute 06/03/2021 0 01     Lymphocytes Absolute 06/03/2021 1 09     Monocytes Absolute 06/03/2021 0 38     Eosinophils Absolute 06/03/2021 0 20     Basophils Absolute 06/03/2021 0 03     Sodium 06/03/2021 142     Potassium 06/03/2021 4 2     Chloride 06/03/2021 105     CO2 06/03/2021 27     ANION GAP 06/03/2021 10     BUN 06/03/2021 11     Creatinine 06/03/2021 0 62     Glucose 06/03/2021 105     Calcium 06/03/2021 8 8     Corrected Calcium 06/03/2021 9 5     AST 06/03/2021 21     ALT 06/03/2021 37     Alkaline Phosphatase 06/03/2021 163*    Total Protein 06/03/2021 7 2     Albumin 06/03/2021 3 1*    Total Bilirubin 06/03/2021 0 79     eGFR 06/03/2021 102     Troponin I 06/03/2021 <0 02     Color, UA 06/03/2021 Yellow     Clarity, UA 06/03/2021 Clear     Specific Gravity, UA 06/03/2021 >=1 030     pH, UA 06/03/2021 5 5     Leukocytes, UA 06/03/2021 Negative     Nitrite, UA 06/03/2021 Negative     Protein, UA 06/03/2021 Negative     Glucose, UA 06/03/2021 Negative     Ketones, UA 06/03/2021 Negative     Urobilinogen, UA 06/03/2021 1 0     Bilirubin, UA 06/03/2021 Interference- unable to analyze*    Blood, UA 06/03/2021 Negative     Ventricular Rate 06/03/2021 53     Atrial Rate 06/03/2021 53     VA Interval 06/03/2021 202     QRSD Interval 06/03/2021 82     QT Interval 06/03/2021 462     QTC Interval 06/03/2021 433     P Axis 06/03/2021 34     QRS Axis 06/03/2021 10     T Wave Axis 06/03/2021 30          Impression:   recurrent suture granuloma? Wound previously healed spontaneously opened as patient was on strong doses of anti-inflammatory drugs, biologics, etcetera  Plan:    Explore wound under local anesthesia with IV sedation at the earliest possible date

## 2021-08-31 DIAGNOSIS — S31.109D OPEN WOUND OF LATERAL ABDOMINAL WALL, SUBSEQUENT ENCOUNTER: Primary | ICD-10-CM

## 2021-08-31 RX ORDER — LEVOFLOXACIN 500 MG/1
500 TABLET, FILM COATED ORAL EVERY 24 HOURS
Qty: 7 TABLET | Refills: 0 | Status: SHIPPED | OUTPATIENT
Start: 2021-08-31 | End: 2021-09-07

## 2021-11-09 ENCOUNTER — CONSULT (OUTPATIENT)
Dept: SURGERY | Facility: CLINIC | Age: 56
End: 2021-11-09
Payer: COMMERCIAL

## 2021-11-09 ENCOUNTER — PREP FOR PROCEDURE (OUTPATIENT)
Dept: SURGERY | Facility: CLINIC | Age: 56
End: 2021-11-09

## 2021-11-09 VITALS — TEMPERATURE: 98.1 F | HEART RATE: 72 BPM | BODY MASS INDEX: 47.09 KG/M2 | WEIGHT: 293 LBS | HEIGHT: 66 IN

## 2021-11-09 DIAGNOSIS — T81.89XA SUTURE GRANULOMA: ICD-10-CM

## 2021-11-09 DIAGNOSIS — S31.109A: Primary | ICD-10-CM

## 2021-11-09 DIAGNOSIS — T81.89XD SUTURE GRANULOMA, SUBSEQUENT ENCOUNTER: Primary | ICD-10-CM

## 2021-11-09 PROCEDURE — 99214 OFFICE O/P EST MOD 30 MIN: CPT | Performed by: SPECIALIST

## 2021-11-09 RX ORDER — LEVOFLOXACIN 500 MG/1
500 TABLET, FILM COATED ORAL EVERY 24 HOURS
Qty: 10 TABLET | Refills: 1 | Status: SHIPPED | OUTPATIENT
Start: 2021-11-09 | End: 2021-11-19

## 2021-11-09 RX ORDER — BUDESONIDE AND FORMOTEROL FUMARATE DIHYDRATE 160; 4.5 UG/1; UG/1
AEROSOL RESPIRATORY (INHALATION)
COMMUNITY
Start: 2021-10-27 | End: 2021-11-12

## 2021-11-16 ENCOUNTER — ANESTHESIA EVENT (OUTPATIENT)
Dept: PERIOP | Facility: HOSPITAL | Age: 56
End: 2021-11-16
Payer: COMMERCIAL

## 2021-11-17 RX ORDER — LEVOFLOXACIN 5 MG/ML
500 INJECTION, SOLUTION INTRAVENOUS EVERY 24 HOURS
Status: CANCELLED | OUTPATIENT
Start: 2021-11-18

## 2021-11-18 ENCOUNTER — ANESTHESIA (OUTPATIENT)
Dept: PERIOP | Facility: HOSPITAL | Age: 56
End: 2021-11-18
Payer: COMMERCIAL

## 2021-11-18 ENCOUNTER — HOSPITAL ENCOUNTER (OUTPATIENT)
Facility: HOSPITAL | Age: 56
Setting detail: OUTPATIENT SURGERY
Discharge: HOME | End: 2021-11-18
Attending: SPECIALIST | Admitting: SPECIALIST
Payer: COMMERCIAL

## 2021-11-18 VITALS
DIASTOLIC BLOOD PRESSURE: 70 MMHG | HEIGHT: 67 IN | OXYGEN SATURATION: 99 % | RESPIRATION RATE: 20 BRPM | SYSTOLIC BLOOD PRESSURE: 147 MMHG | WEIGHT: 293 LBS | BODY MASS INDEX: 45.99 KG/M2 | HEART RATE: 75 BPM | TEMPERATURE: 96.7 F

## 2021-11-18 DIAGNOSIS — T81.89XA SUTURE GRANULOMA: Primary | ICD-10-CM

## 2021-11-18 DIAGNOSIS — S31.109A: ICD-10-CM

## 2021-11-18 PROCEDURE — 20102 EXPL PENTRG WND ABD/FLNK/BK: CPT | Performed by: SPECIALIST

## 2021-11-18 PROCEDURE — 88300 SURGICAL PATH GROSS: CPT | Performed by: PATHOLOGY

## 2021-11-18 PROCEDURE — 10120 INC&RMVL FB SUBQ TISS SMPL: CPT | Performed by: SPECIALIST

## 2021-11-18 RX ORDER — METOCLOPRAMIDE HYDROCHLORIDE 5 MG/ML
INJECTION INTRAMUSCULAR; INTRAVENOUS AS NEEDED
Status: DISCONTINUED | OUTPATIENT
Start: 2021-11-18 | End: 2021-11-18

## 2021-11-18 RX ORDER — ONDANSETRON 2 MG/ML
4 INJECTION INTRAMUSCULAR; INTRAVENOUS ONCE AS NEEDED
Status: DISCONTINUED | OUTPATIENT
Start: 2021-11-18 | End: 2021-11-18 | Stop reason: HOSPADM

## 2021-11-18 RX ORDER — LIDOCAINE HYDROCHLORIDE 10 MG/ML
INJECTION, SOLUTION EPIDURAL; INFILTRATION; INTRACAUDAL; PERINEURAL AS NEEDED
Status: DISCONTINUED | OUTPATIENT
Start: 2021-11-18 | End: 2021-11-18

## 2021-11-18 RX ORDER — LIDOCAINE HYDROCHLORIDE 10 MG/ML
INJECTION, SOLUTION EPIDURAL; INFILTRATION; INTRACAUDAL; PERINEURAL AS NEEDED
Status: DISCONTINUED | OUTPATIENT
Start: 2021-11-18 | End: 2021-11-18 | Stop reason: HOSPADM

## 2021-11-18 RX ORDER — ONDANSETRON 2 MG/ML
4 INJECTION INTRAMUSCULAR; INTRAVENOUS EVERY 8 HOURS PRN
Status: DISCONTINUED | OUTPATIENT
Start: 2021-11-18 | End: 2021-11-18 | Stop reason: HOSPADM

## 2021-11-18 RX ORDER — MIDAZOLAM HYDROCHLORIDE 2 MG/2ML
INJECTION, SOLUTION INTRAMUSCULAR; INTRAVENOUS AS NEEDED
Status: DISCONTINUED | OUTPATIENT
Start: 2021-11-18 | End: 2021-11-18

## 2021-11-18 RX ORDER — OXYCODONE HYDROCHLORIDE AND ACETAMINOPHEN 5; 325 MG/1; MG/1
1 TABLET ORAL EVERY 6 HOURS PRN
Qty: 20 TABLET | Refills: 0 | Status: SHIPPED | OUTPATIENT
Start: 2021-11-18 | End: 2021-11-28

## 2021-11-18 RX ORDER — PROPOFOL 10 MG/ML
INJECTION, EMULSION INTRAVENOUS CONTINUOUS PRN
Status: DISCONTINUED | OUTPATIENT
Start: 2021-11-18 | End: 2021-11-18

## 2021-11-18 RX ORDER — OXYCODONE HYDROCHLORIDE AND ACETAMINOPHEN 5; 325 MG/1; MG/1
2 TABLET ORAL EVERY 4 HOURS PRN
Status: DISCONTINUED | OUTPATIENT
Start: 2021-11-18 | End: 2021-11-18 | Stop reason: HOSPADM

## 2021-11-18 RX ORDER — HYDROMORPHONE HCL/PF 1 MG/ML
0.5 SYRINGE (ML) INJECTION
Status: DISCONTINUED | OUTPATIENT
Start: 2021-11-18 | End: 2021-11-18 | Stop reason: HOSPADM

## 2021-11-18 RX ORDER — OXYCODONE HYDROCHLORIDE AND ACETAMINOPHEN 5; 325 MG/1; MG/1
1 TABLET ORAL EVERY 4 HOURS PRN
Status: DISCONTINUED | OUTPATIENT
Start: 2021-11-18 | End: 2021-11-18 | Stop reason: HOSPADM

## 2021-11-18 RX ORDER — FENTANYL CITRATE 50 UG/ML
INJECTION, SOLUTION INTRAMUSCULAR; INTRAVENOUS AS NEEDED
Status: DISCONTINUED | OUTPATIENT
Start: 2021-11-18 | End: 2021-11-18

## 2021-11-18 RX ORDER — BUPIVACAINE HYDROCHLORIDE 5 MG/ML
INJECTION, SOLUTION EPIDURAL; INTRACAUDAL AS NEEDED
Status: DISCONTINUED | OUTPATIENT
Start: 2021-11-18 | End: 2021-11-18 | Stop reason: HOSPADM

## 2021-11-18 RX ORDER — FENTANYL CITRATE/PF 50 MCG/ML
50 SYRINGE (ML) INJECTION
Status: DISCONTINUED | OUTPATIENT
Start: 2021-11-18 | End: 2021-11-18 | Stop reason: HOSPADM

## 2021-11-18 RX ORDER — FENTANYL CITRATE/PF 50 MCG/ML
25 SYRINGE (ML) INJECTION
Status: COMPLETED | OUTPATIENT
Start: 2021-11-18 | End: 2021-11-18

## 2021-11-18 RX ORDER — LEVOFLOXACIN 5 MG/ML
500 INJECTION, SOLUTION INTRAVENOUS ONCE
Status: COMPLETED | OUTPATIENT
Start: 2021-11-18 | End: 2021-11-18

## 2021-11-18 RX ORDER — PROPOFOL 10 MG/ML
INJECTION, EMULSION INTRAVENOUS AS NEEDED
Status: DISCONTINUED | OUTPATIENT
Start: 2021-11-18 | End: 2021-11-18

## 2021-11-18 RX ORDER — SODIUM CHLORIDE, SODIUM LACTATE, POTASSIUM CHLORIDE, CALCIUM CHLORIDE 600; 310; 30; 20 MG/100ML; MG/100ML; MG/100ML; MG/100ML
125 INJECTION, SOLUTION INTRAVENOUS CONTINUOUS
Status: DISCONTINUED | OUTPATIENT
Start: 2021-11-18 | End: 2021-11-18 | Stop reason: HOSPADM

## 2021-11-18 RX ORDER — MAGNESIUM HYDROXIDE 1200 MG/15ML
LIQUID ORAL AS NEEDED
Status: DISCONTINUED | OUTPATIENT
Start: 2021-11-18 | End: 2021-11-18 | Stop reason: HOSPADM

## 2021-11-18 RX ORDER — EPHEDRINE SULFATE 50 MG/ML
INJECTION INTRAVENOUS AS NEEDED
Status: DISCONTINUED | OUTPATIENT
Start: 2021-11-18 | End: 2021-11-18

## 2021-11-18 RX ORDER — DIPHENHYDRAMINE HYDROCHLORIDE 50 MG/ML
12.5 INJECTION INTRAMUSCULAR; INTRAVENOUS ONCE AS NEEDED
Status: DISCONTINUED | OUTPATIENT
Start: 2021-11-18 | End: 2021-11-18 | Stop reason: HOSPADM

## 2021-11-18 RX ADMIN — FENTANYL CITRATE 50 MCG: 50 INJECTION, SOLUTION INTRAMUSCULAR; INTRAVENOUS at 07:30

## 2021-11-18 RX ADMIN — FENTANYL CITRATE 25 MCG: 50 INJECTION INTRAMUSCULAR; INTRAVENOUS at 08:51

## 2021-11-18 RX ADMIN — LIDOCAINE HYDROCHLORIDE 50 MG: 10 INJECTION, SOLUTION EPIDURAL; INFILTRATION; INTRACAUDAL; PERINEURAL at 07:36

## 2021-11-18 RX ADMIN — PROPOFOL 80 MG: 10 INJECTION, EMULSION INTRAVENOUS at 07:36

## 2021-11-18 RX ADMIN — FENTANYL CITRATE 25 MCG: 50 INJECTION INTRAMUSCULAR; INTRAVENOUS at 09:03

## 2021-11-18 RX ADMIN — PROPOFOL 100 MCG/KG/MIN: 10 INJECTION, EMULSION INTRAVENOUS at 07:38

## 2021-11-18 RX ADMIN — SODIUM CHLORIDE, SODIUM LACTATE, POTASSIUM CHLORIDE, AND CALCIUM CHLORIDE 125 ML/HR: .6; .31; .03; .02 INJECTION, SOLUTION INTRAVENOUS at 06:21

## 2021-11-18 RX ADMIN — HYDROMORPHONE HYDROCHLORIDE 0.5 MG: 1 INJECTION, SOLUTION INTRAMUSCULAR; INTRAVENOUS; SUBCUTANEOUS at 09:43

## 2021-11-18 RX ADMIN — FENTANYL CITRATE 25 MCG: 50 INJECTION INTRAMUSCULAR; INTRAVENOUS at 09:12

## 2021-11-18 RX ADMIN — LEVOFLOXACIN: 500 INJECTION, SOLUTION INTRAVENOUS at 07:25

## 2021-11-18 RX ADMIN — FENTANYL CITRATE 25 MCG: 50 INJECTION, SOLUTION INTRAMUSCULAR; INTRAVENOUS at 07:57

## 2021-11-18 RX ADMIN — HYDROMORPHONE HYDROCHLORIDE 0.5 MG: 1 INJECTION, SOLUTION INTRAMUSCULAR; INTRAVENOUS; SUBCUTANEOUS at 09:34

## 2021-11-18 RX ADMIN — METOCLOPRAMIDE 10 MG: 5 INJECTION, SOLUTION INTRAMUSCULAR; INTRAVENOUS at 07:59

## 2021-11-18 RX ADMIN — FENTANYL CITRATE 25 MCG: 50 INJECTION INTRAMUSCULAR; INTRAVENOUS at 09:08

## 2021-11-18 RX ADMIN — HYDROMORPHONE HYDROCHLORIDE 0.5 MG: 1 INJECTION, SOLUTION INTRAMUSCULAR; INTRAVENOUS; SUBCUTANEOUS at 09:27

## 2021-11-18 RX ADMIN — MIDAZOLAM 2 MG: 1 INJECTION INTRAMUSCULAR; INTRAVENOUS at 07:30

## 2021-11-18 RX ADMIN — EPHEDRINE SULFATE 10 MG: 50 INJECTION, SOLUTION INTRAVENOUS at 08:01

## 2021-11-18 RX ADMIN — FENTANYL CITRATE 25 MCG: 50 INJECTION, SOLUTION INTRAMUSCULAR; INTRAVENOUS at 08:30

## 2021-11-18 RX ADMIN — OXYCODONE HYDROCHLORIDE AND ACETAMINOPHEN 1 TABLET: 5; 325 TABLET ORAL at 10:35

## 2021-12-08 ENCOUNTER — OFFICE VISIT (OUTPATIENT)
Dept: SURGERY | Facility: CLINIC | Age: 56
End: 2021-12-08

## 2021-12-08 VITALS
DIASTOLIC BLOOD PRESSURE: 72 MMHG | HEIGHT: 67 IN | WEIGHT: 293 LBS | HEART RATE: 72 BPM | TEMPERATURE: 96.4 F | BODY MASS INDEX: 45.99 KG/M2 | SYSTOLIC BLOOD PRESSURE: 140 MMHG

## 2021-12-08 DIAGNOSIS — T81.89XD SUTURE GRANULOMA, SUBSEQUENT ENCOUNTER: Primary | ICD-10-CM

## 2021-12-08 PROCEDURE — 99024 POSTOP FOLLOW-UP VISIT: CPT | Performed by: SPECIALIST

## 2022-03-29 ENCOUNTER — OFFICE VISIT (OUTPATIENT)
Dept: SURGERY | Facility: CLINIC | Age: 57
End: 2022-03-29
Payer: COMMERCIAL

## 2022-03-29 VITALS
TEMPERATURE: 98.4 F | WEIGHT: 293 LBS | HEIGHT: 67 IN | SYSTOLIC BLOOD PRESSURE: 142 MMHG | HEART RATE: 86 BPM | OXYGEN SATURATION: 97 % | DIASTOLIC BLOOD PRESSURE: 74 MMHG | BODY MASS INDEX: 45.99 KG/M2

## 2022-03-29 DIAGNOSIS — B37.2 YEAST INFECTION OF THE SKIN: ICD-10-CM

## 2022-03-29 DIAGNOSIS — S31.109A OPEN ABDOMINAL WALL WOUND: Primary | ICD-10-CM

## 2022-03-29 PROCEDURE — 99213 OFFICE O/P EST LOW 20 MIN: CPT | Performed by: SPECIALIST

## 2022-03-29 RX ORDER — OXYCODONE HYDROCHLORIDE 5 MG/1
5 TABLET ORAL EVERY 8 HOURS PRN
COMMUNITY
Start: 2022-03-11 | End: 2022-07-14 | Stop reason: ALTCHOICE

## 2022-03-29 RX ORDER — AMOXICILLIN 250 MG
1 CAPSULE ORAL 2 TIMES DAILY
COMMUNITY
Start: 2022-02-25 | End: 2022-07-14 | Stop reason: ALTCHOICE

## 2022-03-29 RX ORDER — DELAFLOXACIN MEGLUMINE 450 MG/1
430 TABLET ORAL 2 TIMES DAILY
Qty: 20 TABLET | Refills: 1 | Status: SHIPPED | OUTPATIENT
Start: 2022-03-29 | End: 2022-04-08

## 2022-03-29 RX ORDER — METHOCARBAMOL 500 MG/1
500 TABLET, FILM COATED ORAL EVERY 6 HOURS
COMMUNITY
Start: 2022-03-11 | End: 2022-07-14 | Stop reason: ALTCHOICE

## 2022-03-29 RX ORDER — METHYL SALICYLATE/MENTHOL
1 CREAM (GRAM) TOPICAL 3 TIMES DAILY
COMMUNITY
Start: 2022-02-25 | End: 2022-07-14 | Stop reason: ALTCHOICE

## 2022-03-29 RX ORDER — METHOTREXATE SODIUM 25 MG/ML
15 INJECTION, SOLUTION INTRA-ARTERIAL; INTRAMUSCULAR; INTRAVENOUS
COMMUNITY
Start: 2022-02-25 | End: 2023-02-25

## 2022-03-29 RX ORDER — AMLODIPINE BESYLATE 5 MG/1
5 TABLET ORAL DAILY
COMMUNITY
Start: 2022-02-26

## 2022-03-29 RX ORDER — HYDROCORTISONE ACETATE, IODOQUINOL 19; 10 MG/G; MG/G
CREAM TOPICAL 4 TIMES DAILY
Qty: 40 G | Refills: 1 | Status: SHIPPED | OUTPATIENT
Start: 2022-03-29

## 2022-03-29 NOTE — PROGRESS NOTES
Chief Complaint:  Chronic wound      History of Present Illness:  Amrik Maloney is a 59-year-old white female a patient of ours for many years  Multiple previous operative procedures  Open gastric bypass [place] in 2000  At that time she weighed 600 lb  She currently weighs 296  Gastric bypass revision 2006  Tummy tuck 2007  Multiple incisional hernias repaired with mesh  Subsequent incisional hernia repair  Laparoscopic assisted repair of hernia with lysis of adhesions  Removal of suture granuloma from incision  Symptomatic gallstones laparoscopic cholecystectomy 2016  In the past she has developed a tender bulge at the superior aspect of her midline incision  Draining  Pain  Multiple suture granulomas removed at that time  When last explored a portion of mesh was removed  She has a long history of psoriatic arthritis and has been on various medications including steroids, biologics (Humira, Cosentyx),  These have obviously affected her healing   She presents today with recurrent drainage from the superior aspect of her incision  She says it is purulent and smells  She is here today in regards to this  She recently sustained a fall and was hospitalized at [facility] with multiple rib fractures          Past Medical History:   Past Medical History:   Diagnosis Date    Anemia     hx of receiving iron infusions    Anxiety     Arthritis     Asthma     Chronic pain disorder     left humerus/right shoulder/psoriatic arhtritis chronic tendonitis    Clotting disorder (HCC)     pt unsure of this,,,"did have to take coumadin for a blood clot/pulm emboli after a fracture surgery around 1995 or so"    Exercise involving cycling     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    GERD (gastroesophageal reflux disease)     History of bariatric surgery 2006    Roun-Y ; weight loss of 400lbs and has gained 30-50 lbs back or so    History of pneumonia     History of transfusion 2000  Limb alert care status     Left arm No BP/Labs or /IV's    Muscle weakness     left Arm    Psoriasis     Psoriatic arthritis ([facility] [address] ) 1995    Pulmonary emboli (HCC) 1995    post humeral frac    Radial nerve palsy, left     arm    Suture granuloma     Vitamin D deficiency     Wears glasses     reading         Past Surgical History:    Past Surgical History:   Procedure Laterality Date    ABDOMINAL SURGERY      CHOLECYSTECTOMY      COLON SURGERY      COLONOSCOPY      ESSURE TUBAL LIGATION      FACIAL/NECK BIOPSY N/A 7/17/2020    Procedure: NECK EXPLORATION;  Surgeon: Roxann Ludwig DO;  Location: [facility];  Service: ENT    FOREIGN BODY REMOVAL N/A 6/4/2021    Procedure: Removal of suture granuloma abdomen;  Surgeon: Laura Gar MD;  Location: [address] [place] [address] MAIN OR;  Service: General    FRACTURE SURGERY  1995    humeral frac repair/rods/metal plates left arm    GASTRIC BYPASS OPEN  2006    HERNIA REPAIR      X3 with mesh    INCISION AND DRAINAGE ANTERIOR NECK Right 6/16/2020    Procedure: INCISION AND DRAINAGE  (I&D) NECK;  Surgeon: Roaxnn Ludwig DO;  [facility];  Service: ENT    IVC Devinside    radha filter    PANNICULECTOMY      DE EXCISION SUBMAXILLARY GLAND Right 6/12/2020    Procedure: EXCISION SUBMANDIBULAR GLAND;  Surgeon: Roxann Ludwig DO;  [facility];  Service: ENT    DE EXPLORE WOUND,ABDOMEN/FLANK/BACK N/A 1/31/2020    Procedure: exploation of abdominal wound removal suture granulomas and foreign body;  Surgeon: Laura Gar MD;  Location: [address] [place] [address] MAIN OR;  Service: General    DE EXPLORE WOUND,ABDOMEN/FLANK/BACK N/A 3/2/2020    Procedure: EXPLORE WOUND OF BACK LEFT OPEN WITH PACKING;  Surgeon: Laura Gar MD;  Location: [address] [place] [address] MAIN OR;  Service: General    DE EXPLORE WOUND,ABDOMEN/FLANK/BACK N/A 11/18/2021    Procedure: WOUND EXPLORATION REMOVAL OF SUTURE GRANULOMA;  Surgeon: Laura Gar MD;  Location:  MAIN OR;  Service: General    SMALL INTESTINE SURGERY      VENA CAVA FILTER PLACEMENT           Allergies: Allergies   Allergen Reactions    Penicillins Other (See Comments) and Anaphylaxis     "PARALYZES HER"  Was paralized    "PARALYZES HER"  Was paralized  "PARALYZES HER"  Was paralized  "PARALYZES HER"  Was paralyzed    Morphine Itching    Prednisone Abdominal Pain     Worsening psoriasis;     Has taken it if needed/as prescribed         Medications:    Current Outpatient Medications:     amLODIPine (NORVASC) 5 mg tablet, Take 5 mg by mouth daily, Disp: , Rfl:     budesonide-formoterol (Symbicort) 80-4 5 MCG/ACT inhaler, Inhale 2 puffs 2 (two) times a day, Disp: , Rfl:     Cholecalciferol 50 MCG (2000 UT) CAPS, Take 1 capsule by mouth 4000 units daily , Disp: , Rfl:     loratadine (CLARITIN) 10 mg tablet, Take 10 mg by mouth 2 (two) times a day, Disp: , Rfl:     Methotrexate Sodium (methotrexate, PF,) 250 MG/10ML injection, Inject 15 mg under the skin, Disp: , Rfl:     omeprazole (PRILOSEC) 20 mg delayed release capsule, Take 20 mg by mouth every morning , Disp: , Rfl:     secukinumab (COSENTYX) 150 mg/mL SOAJ injection, Inject 300 mg under the skin every 28 days , Disp: , Rfl:     Delafloxacin Meglumine (Baxdela) 450 MG TABS, Take 430 mg by mouth 2 (two) times a day for 10 days, Disp: 20 tablet, Rfl: 1    Iodoquinol-Hydrocortisone-Aloe 1-1 9 % CREA, Apply topically 4 (four) times a day, Disp: 40 g, Rfl: 1    Menthol-Methyl Salicylate (Thera-Gesic) 0 5-15 % CREA, Apply 1 application topically Three times a day (Patient not taking: Reported on 3/29/2022 ), Disp: , Rfl:     methocarbamol (ROBAXIN) 500 mg tablet, Take 500 mg by mouth every 6 (six) hours (Patient not taking: Reported on 3/29/2022 ), Disp: , Rfl:     mupirocin (BACTROBAN) 2 % ointment, Apply topically 3 (three) times a day (Patient not taking: Reported on 6/22/2021), Disp: 30 g, Rfl: 0    oxyCODONE (ROXICODONE) 5 immediate release tablet, Take 5 mg by mouth every 8 (eight) hours as needed (Patient not taking: Reported on 3/29/2022 ), Disp: , Rfl:     oxyCODONE-acetaminophen (PERCOCET) 5-325 mg per tablet, Take 1 tablet by mouth every 6 (six) hours as needed for moderate painMax Daily Amount: 4 tablets (Patient not taking: Reported on 2021 ), Disp: 20 tablet, Rfl: 0    senna-docusate sodium (SENOKOT S) 8 6-50 mg per tablet, Take 1 tablet by mouth 2 (two) times a day (Patient not taking: Reported on 3/29/2022 ), Disp: , Rfl:       Social History:  Social History     Social History     Substance and Sexual Activity   Alcohol Use Not Currently     Social History     Substance and Sexual Activity   Drug Use Never     Social History     Tobacco Use   Smoking Status Former Smoker    Packs/day: 0 50    Types: Cigarettes    Quit date: 2021    Years since quittin 0   Smokeless Tobacco Never Used         Family History:    Family History   Problem Relation Age of Onset    Heart block Mother     Diabetes Mother     No Known Problems Father          Review of Systems:    Arthralgias  Psoriasis changes of the skin  Rib pain  Paralysis of left upper extremity  All other review of systems are negative    Vitals:  Vitals:    22 1316   BP: 142/74   Pulse: 86   Temp: 98 4 °F (36 9 °C)   SpO2: 97%       Physical Exam:  Middle-aged obese white female 5 ft 7-96 lb  She is awake alert no distress  Vital signs as above    Skin she has generalized psoriasis with raised erythematous and scaling rash  Midline incision:  Most superior aspect it bulges out  Some serous drainage is noted  The wound is probed  Minimal purulence is noted  Bloody drainage is present  Mesh is palpable with the probe  There is also a rash on the upper abdomen that appears to be involved with yeast       Lab Results: I have personally reviewed pertinent reports  See below  Imaging: I have personally reviewed pertinent reports     EKG, Pathology, and Other Studies: I have personally reviewed pertinent reports  No visits with results within 1 Day(s) from this visit  Latest known visit with results is:   Admission on 11/18/2021, Discharged on 11/18/2021   Component Date Value    Case Report 11/18/2021                      Value:Surgical Pathology Report                         Case: [id number]                                   Authorizing Provider:  Fela Wong MD           Collected:           11/18/2021 0085              Ordering Location:     [facility] Received:            11/18/2021 1406                                     Heart Operating Room                                                         Pathologist:           Suhas Morales MD                                                                 Specimen:    Foreign body, foreign body of abdomen                                                      Final Diagnosis 11/18/2021                      Value: This result contains rich text formatting which cannot be displayed here   Additional Information 11/18/2021                      Value: This result contains rich text formatting which cannot be displayed here  Luis Abreu Gross Description 11/18/2021                      Value: This result contains rich text formatting which cannot be displayed here  Impression:  Persistent low-grade mesh infection in an immunocompromised patient  Yeast infection    Plan:  Antibiotics  Topical antifungals  Return 2 weeks

## 2022-04-12 ENCOUNTER — OFFICE VISIT (OUTPATIENT)
Dept: SURGERY | Facility: CLINIC | Age: 57
End: 2022-04-12
Payer: COMMERCIAL

## 2022-04-12 VITALS
HEART RATE: 82 BPM | WEIGHT: 293 LBS | TEMPERATURE: 98.4 F | HEIGHT: 67 IN | BODY MASS INDEX: 45.99 KG/M2 | OXYGEN SATURATION: 97 %

## 2022-04-12 DIAGNOSIS — S31.109A OPEN ABDOMINAL WALL WOUND: Primary | ICD-10-CM

## 2022-04-12 PROCEDURE — 99212 OFFICE O/P EST SF 10 MIN: CPT | Performed by: SPECIALIST

## 2022-04-12 RX ORDER — LEVOFLOXACIN 500 MG/1
500 TABLET, FILM COATED ORAL EVERY 24 HOURS
Qty: 10 TABLET | Refills: 0 | Status: SHIPPED | OUTPATIENT
Start: 2022-04-12 | End: 2022-04-22

## 2022-04-12 NOTE — PROGRESS NOTES
Jaki Garza presents today for follow-up visit in regards to a chronic wound in the upper abdomen  She was seen 2 weeks ago and was also noted to have a yeast infection of the skin  She was placed on antibiotics for the wound topical antifungal for the skin  She is here today for follow-up visit  Today in the office the wound looks significantly better  It is more focal   Still small opening with minimal purulence present  Yeast infection also significantly improved  Wound appears raised with the suggestion of a small palpable mass in the subcutaneous tissue  ? Wound probed Q-tip  Silver nitrate applied  Impression:  Chronic wound  If there is a foreign body it should extrude through the wound  She did have gallstones come out of a wound in her back  ?    Plan:  10 days more of antibiotics  Finish antifungals  See how the silver nitrate affects the wound    Return 2 weeks

## 2022-04-26 ENCOUNTER — OFFICE VISIT (OUTPATIENT)
Dept: SURGERY | Facility: CLINIC | Age: 57
End: 2022-04-26
Payer: COMMERCIAL

## 2022-04-26 VITALS
TEMPERATURE: 98.2 F | HEIGHT: 67 IN | OXYGEN SATURATION: 99 % | BODY MASS INDEX: 45.99 KG/M2 | HEART RATE: 62 BPM | WEIGHT: 293 LBS

## 2022-04-26 DIAGNOSIS — E66.01 MORBID (SEVERE) OBESITY DUE TO EXCESS CALORIES (HCC): ICD-10-CM

## 2022-04-26 DIAGNOSIS — S31.109D OPEN WOUND OF ABDOMINAL WALL, SUBSEQUENT ENCOUNTER: Primary | ICD-10-CM

## 2022-04-26 PROCEDURE — 99212 OFFICE O/P EST SF 10 MIN: CPT | Performed by: SPECIALIST

## 2022-04-26 RX ORDER — LEVOFLOXACIN 500 MG/1
500 TABLET, FILM COATED ORAL EVERY 24 HOURS
Qty: 14 TABLET | Refills: 1 | Status: SHIPPED | OUTPATIENT
Start: 2022-04-26 | End: 2022-05-10

## 2022-04-26 NOTE — PROGRESS NOTES
Patient much improved  Minimal drainage from the wound itself  It is almost healed  Abdominal wound flatter with less protrusion  Small opening almost closed  Redressed  Impression:  Marlen Sales better? Plan: Additional antibiotics for 2 weeks  Bring her back for re-evaluation

## 2022-06-08 ENCOUNTER — TELEPHONE (OUTPATIENT)
Dept: OBGYN CLINIC | Facility: OTHER | Age: 57
End: 2022-06-08

## 2022-06-13 ENCOUNTER — OFFICE VISIT (OUTPATIENT)
Dept: OBGYN CLINIC | Facility: OTHER | Age: 57
End: 2022-06-13
Payer: OTHER MISCELLANEOUS

## 2022-06-13 ENCOUNTER — APPOINTMENT (OUTPATIENT)
Dept: RADIOLOGY | Facility: OTHER | Age: 57
End: 2022-06-13
Payer: OTHER MISCELLANEOUS

## 2022-06-13 VITALS — BODY MASS INDEX: 45.99 KG/M2 | HEIGHT: 67 IN | WEIGHT: 293 LBS

## 2022-06-13 DIAGNOSIS — M24.811 INTERNAL DERANGEMENT OF SHOULDER, RIGHT: Primary | ICD-10-CM

## 2022-06-13 DIAGNOSIS — M25.511 RIGHT SHOULDER PAIN, UNSPECIFIED CHRONICITY: ICD-10-CM

## 2022-06-13 PROCEDURE — 73030 X-RAY EXAM OF SHOULDER: CPT

## 2022-06-13 PROCEDURE — 99244 OFF/OP CNSLTJ NEW/EST MOD 40: CPT | Performed by: ORTHOPAEDIC SURGERY

## 2022-06-13 NOTE — LETTER
June 16, 2022     Emmanuelle Pinto MD  [address] [facility]  [address] [facility] [address]    Patient: Myron Jo   YOB: 1965   Date of Visit: 6/13/2022       Dear Dr Alex Ribera: Thank you for referring Dorothea Yang to me for evaluation  Below are my notes for this consultation  If you have questions, please do not hesitate to call me  I look forward to following your patient along with you  Sincerely,        Saul Gonzalez MD        CC: No Recipients  Saul Gonzalez MD  6/13/2022  9:05 AM  Signed    Assessment  Diagnoses and all orders for this visit:    Right shoulder pain, unspecified chronicity  Internal derangement of  shoulder , right          Discussion and Plan:  Discussed with patient  treatment plan  Will  ordering MRI of the right shoulder to rule out rotator cuff tear and or better define the pathology present /She is to continue with physical therapy in the meantime as we wait the results  If she continues to have numbness and tingling down the right arm will consider referring patient to occupational medicine  Work note was given out today, no use of the right arm at this time     She is to follow-up to discuss MRI of the right shoulder  She is status post a surgery in 2010 the details of which are unclear but does appear to me based on metallic anchors that she had a remplissage for Hill-Sachs deformity, the MRI may be more helpful at determining with this procedure was  I have also asked the patient to try to uncover those notes from procedure if at all possible which she felt was unlikely that she would be able to find this note       Subjective:   Patient ID: Myron Jo is a 64 y o  female      HPI  Patient presents today consultation for right shoulder pain  She has history of right shoulder Hill-Sachs deformity and had surgery in 2010 at [address] due to dislocating her shoulder     She had an injury at work on 05/09/2022  She states she was lifting files out of a box and fell forward striking her right shoulder on to shelving  She was taking to the ED by ambulance had x-rays taken of the right shoulder showed no acute osseous abnormalities  She states she was in hospital for a week due to not unable to move her right arm and then was inpatient rehab for one week  She started outpatient physical therapy is going 3 times a week  She states she is having generalized right shoulder pain and burning and tingling med lateral biceps radiating down to the thumb  She does state numbness in the left thumb since the injury  She has pain with lifting with her right arm  She is taking ibuprofen as needed for pain relief  She has history of left nonunion distal humerus fracture with radial palsy in 1994  The following portions of the patient's history were reviewed and updated as appropriate: allergies, current medications, past family history, past medical history, past social history, past surgical history and problem list     Review of Systems    Objective:  Ht 5' 7" (1 702 m)   Wt 134 kg (296 lb)   LMP 06/25/2020   BMI 46 36 kg/m²       Left Shoulder Exam     Other   Scars: present     Comments:  Limited active range of motion due to pain   Limited passive range of motion due to being            Physical Exam  Constitutional:       Appearance: She is well-developed  HENT:      Head: Normocephalic and atraumatic  Eyes:      Pupils: Pupils are equal, round, and reactive to light  Cardiovascular:      Rate and Rhythm: Normal rate and regular rhythm  Pulmonary:      Effort: Pulmonary effort is normal       Breath sounds: Normal breath sounds  Musculoskeletal:      Cervical back: Neck supple  Skin:     General: Skin is warm and dry  Neurological:      Mental Status: She is alert and oriented to person, place, and time     Psychiatric:         Behavior: Behavior normal          Thought Content: Thought content normal            I have personally reviewed pertinent films in PACS and my interpretation is as follows  X-ray right shoulder demonstrates hardware intact, mild degenerative changes and possible chronic rotator cuff tear    The metallic anchors are in the humeral head possibly from a remplissage for the Hill-Sachs deformity    Scribe Attestation    I,:  Rick Bradford am acting as a scribe while in the presence of the attending physician :       I,:  Mirna Palacios MD personally performed the services described in this documentation    as scribed in my presence :

## 2022-06-13 NOTE — LETTER
June 13, 2022     Patient: Odalis Santacruz  YOB: 1965  Date of Visit: 6/13/2022      To Whom it May Concern:    Diogo Patel is under my professional care  Jayson Saenz was seen in my office on 6/13/2022  Jayson Saenz may return back to work with no use of the right arm  If you have any questions or concerns, please don't hesitate to call           Sincerely,          Az Álvarez MD        CC: No Recipients

## 2022-06-13 NOTE — PROGRESS NOTES
Assessment  Diagnoses and all orders for this visit:    Right shoulder pain, unspecified chronicity  Internal derangement of  shoulder , right          Discussion and Plan:  Discussed with patient  treatment plan  Will  ordering MRI of the right shoulder to rule out rotator cuff tear and or better define the pathology present /She is to continue with physical therapy in the meantime as we wait the results  If she continues to have numbness and tingling down the right arm will consider referring patient to occupational medicine  Work note was given out today, no use of the right arm at this time     She is to follow-up to discuss MRI of the right shoulder  She is status post a surgery in 2010 the details of which are unclear but does appear to me based on metallic anchors that she had a remplissage for Hill-Sachs deformity, the MRI may be more helpful at determining with this procedure was  I have also asked the patient to try to uncover those notes from procedure if at all possible which she felt was unlikely that she would be able to find this note       Subjective:   Patient ID: Himanshu Landrum is a 64 y o  female      HPI  Patient presents today consultation for right shoulder pain  She has history of right shoulder Hill-Sachs deformity and had surgery in 2010 at [address] due to dislocating her shoulder  She had an injury at work on 05/09/2022  She states she was lifting files out of a box and fell forward striking her right shoulder on to shelving  She was taking to the ED by ambulance had x-rays taken of the right shoulder showed no acute osseous abnormalities  She states she was in hospital for a week due to not unable to move her right arm and then was inpatient rehab for one week  She started outpatient physical therapy is going 3 times a week  She states she is having generalized right shoulder pain and burning and tingling med lateral biceps radiating down to the thumb    She does state numbness in the left thumb since the injury  She has pain with lifting with her right arm  She is taking ibuprofen as needed for pain relief  She has history of left nonunion distal humerus fracture with radial palsy in 1994  The following portions of the patient's history were reviewed and updated as appropriate: allergies, current medications, past family history, past medical history, past social history, past surgical history and problem list     Review of Systems    Objective:  Ht 5' 7" (1 702 m)   Wt 134 kg (296 lb)   LMP 06/25/2020   BMI 46 36 kg/m²       Left Shoulder Exam     Other   Scars: present     Comments:  Limited active range of motion due to pain   Limited passive range of motion due to being            Physical Exam  Constitutional:       Appearance: She is well-developed  HENT:      Head: Normocephalic and atraumatic  Eyes:      Pupils: Pupils are equal, round, and reactive to light  Cardiovascular:      Rate and Rhythm: Normal rate and regular rhythm  Pulmonary:      Effort: Pulmonary effort is normal       Breath sounds: Normal breath sounds  Musculoskeletal:      Cervical back: Neck supple  Skin:     General: Skin is warm and dry  Neurological:      Mental Status: She is alert and oriented to person, place, and time  Psychiatric:         Behavior: Behavior normal          Thought Content: Thought content normal            I have personally reviewed pertinent films in PACS and my interpretation is as follows  X-ray right shoulder demonstrates hardware intact, mild degenerative changes and possible chronic rotator cuff tear    The metallic anchors are in the humeral head possibly from a remplissage for the Hill-Sachs deformity    Scribe Attestation    I,:  Samantha Paris am acting as a scribe while in the presence of the attending physician :       I,:  Az Álvarez MD personally performed the services described in this documentation    as scribed in my presence :

## 2022-06-14 ENCOUNTER — TELEPHONE (OUTPATIENT)
Dept: OBGYN CLINIC | Facility: HOSPITAL | Age: 57
End: 2022-06-14

## 2022-06-14 NOTE — TELEPHONE ENCOUNTER
Roberto Pinto, , calling in asking if patient needs to be seen after MRI  Confirmed Dr Maldonado Call instructions that she is to return after MRI  Also asked for office notes to be faxed  Fax # [phone]    Faxed to number provided

## 2022-06-27 ENCOUNTER — TELEPHONE (OUTPATIENT)
Dept: OBGYN CLINIC | Facility: HOSPITAL | Age: 57
End: 2022-06-27

## 2022-06-27 NOTE — TELEPHONE ENCOUNTER
Dr Adore Cadet is unexpectedly out of the office today and we need to reschedule this appointment  I left a voicemail for the patient to call back and reschedule  When the patient calls back, please reschedule appointment      Thank you

## 2022-07-11 ENCOUNTER — OFFICE VISIT (OUTPATIENT)
Dept: OBGYN CLINIC | Facility: OTHER | Age: 57
End: 2022-07-11
Payer: OTHER MISCELLANEOUS

## 2022-07-11 ENCOUNTER — TELEPHONE (OUTPATIENT)
Dept: OBGYN CLINIC | Facility: HOSPITAL | Age: 57
End: 2022-07-11

## 2022-07-11 VITALS — HEIGHT: 67 IN | WEIGHT: 290 LBS | BODY MASS INDEX: 45.52 KG/M2

## 2022-07-11 DIAGNOSIS — S46.811A TEAR OF RIGHT INFRASPINATUS TENDON, INITIAL ENCOUNTER: ICD-10-CM

## 2022-07-11 DIAGNOSIS — M75.101 TEAR OF RIGHT SUPRASPINATUS TENDON: Primary | ICD-10-CM

## 2022-07-11 PROCEDURE — 99214 OFFICE O/P EST MOD 30 MIN: CPT | Performed by: ORTHOPAEDIC SURGERY

## 2022-07-11 PROCEDURE — 20610 DRAIN/INJ JOINT/BURSA W/O US: CPT | Performed by: ORTHOPAEDIC SURGERY

## 2022-07-11 RX ORDER — BUPIVACAINE HYDROCHLORIDE 2.5 MG/ML
2 INJECTION, SOLUTION INFILTRATION; PERINEURAL
Status: COMPLETED | OUTPATIENT
Start: 2022-07-11 | End: 2022-07-11

## 2022-07-11 RX ORDER — BETAMETHASONE SODIUM PHOSPHATE AND BETAMETHASONE ACETATE 3; 3 MG/ML; MG/ML
6 INJECTION, SUSPENSION INTRA-ARTICULAR; INTRALESIONAL; INTRAMUSCULAR; SOFT TISSUE
Status: COMPLETED | OUTPATIENT
Start: 2022-07-11 | End: 2022-07-11

## 2022-07-11 RX ADMIN — BETAMETHASONE SODIUM PHOSPHATE AND BETAMETHASONE ACETATE 6 MG: 3; 3 INJECTION, SUSPENSION INTRA-ARTICULAR; INTRALESIONAL; INTRAMUSCULAR; SOFT TISSUE at 11:42

## 2022-07-11 RX ADMIN — BUPIVACAINE HYDROCHLORIDE 2 ML: 2.5 INJECTION, SOLUTION INFILTRATION; PERINEURAL at 11:42

## 2022-07-11 NOTE — LETTER
July 11, 2022     Patient: Wilfred Smith  YOB: 1965  Date of Visit: 7/11/2022      To Whom it May Concern:    Ji Resendiz is under my professional care  Don Velazquez was seen in my office on 7/11/2022  Dayadwoa Velazquez may return to work with limitations no pushing, pulling or lifting greater than one pound       If you have any questions or concerns, please don't hesitate to call           Sincerely,          Duglas Payne MD        CC: No Recipients

## 2022-07-11 NOTE — PATIENT INSTRUCTIONS

## 2022-07-11 NOTE — PROGRESS NOTES
Assessment    Complete tear the supraspinatus and  infraspinatus tendons with  muscle atrophy      Discussion and Plan:    · I did review the MRI findings with the patient and her  was present for this visit  Clearly there is a massive recurrent supraspinatus and infraspinatus tear with retraction and atrophy of both the supraspinatus and infraspinatus  This is not a repairable tear in my opinion and surgical options revolve around use of in space interposition balloon verses reverse total shoulder arthroplasty  Both of these are reasonable options and I would likely lean towards the in space balloon as she does not have proximal migration on her x-ray and only minimal degenerative change of the glenohumeral joint but I would not offer her surgical treatment at this time and instead focus on appropriate nonoperative care including an injection of corticosteroid and a trial of physical therapy  The patient did wish to proceed forward with this approach and this was provided as detailed in the note  · Patient is extremely limited with her right upper extremity and states she cannot use a computer or even right, I feel though symptoms are much more likely related to the cervical spine symptoms that we discussed at last visit and I again advised her to discuss this aspect of her injury with her case management and or a occupational medicine physician so they can help triage her to the appropriate treating physician for cervical spine radiculopathy  · As for her shoulder I do feel she is capable of restricted work with no pushing or pulling greater than 2 lb right upper extremity  A note to this effect was provided as I feel the other limitations (including using a computer and writing) are more likely related to a source other than her shoulder injury    · The patient did mention that she was referred to [facility] in-situ in the past by her previous surgeon Dr Josi Wallace and they said that she was a candidate for reverse total shoulder but she was too young to consider at that time  This is based on her report as I do not have those notes available to me  This confirms to me that she likely has had a problem that she was compensating well for until she had this acute injury and decompensated    Subjective:   Patient ID: Roverto Menendez is a 62 y o  female      HPI    43-year-old female who presents today follow-up for right shoulder pain due to a work injury on 05/09/2022  She is present today with her  Sandie Friday  She states she is doing the same since last office visit  She is not going to physical therapy due to the pain  She feels the pain is taking over her daily activities  She is having tingling radiating down the right shoulder and numbness in the thumb index and middle fingers  Pain is worse at night is on gabapentin with no relief  She does feel her right arm is weak and has trouble holding a coffee mug  She is taking gabapentin at night for her nerve pain  She is here to discuss MRIs of the right shoulder  She does have history of a Hill-Sachs deformity and dislocation had surgery in 2010 at [facility]  She states in the past she did seek second opinion her right shoulder for reverse total shoulder surgery was not a surgical candidate due to being at a young age      The following portions of the patient's history were reviewed and updated as appropriate: allergies, current medications, past family history, past medical history, past social history, past surgical history and problem list     Review of Systems    Objective:  Ht 5' 7" (1 702 m)   Wt 132 kg (290 lb)   LMP 06/25/2020   BMI 45 42 kg/m²       Right Shoulder Exam     Range of Motion   Active abduction: abnormal   External rotation: abnormal   Forward flexion: abnormal     Other   Erythema: absent  Scars: present    Comments:  Limited motion and weakness due to pain            Physical Exam      I have personally reviewed pertinent films in PACS and my interpretation is as follows  MRI right shoulder complete tear of supraspinatus and infraspinatus tendons  with muscle atrophy      Large joint arthrocentesis: R subacromial bursa  Universal Protocol:  Consent: Verbal consent obtained    Risks and benefits: risks, benefits and alternatives were discussed  Consent given by: patient  Timeout called at: 7/11/2022 11:41 AM   Patient understanding: patient states understanding of the procedure being performed  Site marked: the operative site was marked  Supporting Documentation  Indications: pain   Procedure Details  Location: shoulder - R subacromial bursa  Preparation: Patient was prepped and draped in the usual sterile fashion  Needle size: 22 G  Medications administered: 2 mL bupivacaine 0 25 %; 6 mg betamethasone acetate-betamethasone sodium phosphate 6 (3-3) mg/mL    Patient tolerance: patient tolerated the procedure well with no immediate complications  Dressing:  Sterile dressing applied          Scribe Attestation    I,:  Jl Weldon am acting as a scribe while in the presence of the attending physician :       I,:  Stephanie Robison MD personally performed the services described in this documentation    as scribed in my presence :

## 2022-07-11 NOTE — TELEPHONE ENCOUNTER
Patient's  called to confirm patient's appointment is still scheduled for today and we received her MRI results

## 2022-07-14 ENCOUNTER — TELEPHONE (OUTPATIENT)
Dept: OBGYN CLINIC | Facility: CLINIC | Age: 57
End: 2022-07-14

## 2022-07-14 DIAGNOSIS — M75.101 TEAR OF RIGHT SUPRASPINATUS TENDON: Primary | ICD-10-CM

## 2022-07-14 DIAGNOSIS — S46.811D TEAR OF RIGHT INFRASPINATUS TENDON, SUBSEQUENT ENCOUNTER: ICD-10-CM

## 2022-07-14 NOTE — TELEPHONE ENCOUNTER
Dr Leal   RE: Updated PT   CB#: [phone] opt 2       Amanda from [facility] called into the office requesting a new PT script for this patient to be ordered         Please fax to: [phone] Katherin Chi)

## 2022-07-19 ENCOUNTER — TELEPHONE (OUTPATIENT)
Dept: NEUROSURGERY | Facility: CLINIC | Age: 57
End: 2022-07-19

## 2022-07-21 NOTE — TELEPHONE ENCOUNTER
7/21/22 RECEIVED REFERRAL FROM [facility] NURSE  FOR PT TO SEE DKO  DX ON REFERRAL SCRIPT IS RIGHT SHOULDER/ARM  CALLED LESLIE [phone] AND LMOM FOR HER TO CB TO DISCUSS  PAPERWORK STATES DKO ON PANEL  SENT RECORDS TO MAGALIE TO BE SCANNED INTO CHART

## 2022-08-10 ENCOUNTER — TELEPHONE (OUTPATIENT)
Dept: OBGYN CLINIC | Facility: MEDICAL CENTER | Age: 57
End: 2022-08-10

## 2022-08-10 NOTE — TELEPHONE ENCOUNTER
Patient sees Dr Danette Cortez at [facility] is requesting a call back to speak about this patients  Right shoulder  She is asking for a call   If she cannot speak to the office today, please call her on Friday      CB #  [id number], opt 2

## 2022-08-15 ENCOUNTER — TELEPHONE (OUTPATIENT)
Dept: OBGYN CLINIC | Facility: OTHER | Age: 57
End: 2022-08-15

## 2022-08-15 NOTE — TELEPHONE ENCOUNTER
I spoke on the phone with Morales Betts, physical therapist, at [address] [place] [address] regarding Anca Bueno today  Morales Betts reports she is not making any progress  I am unable to see progress report sent via fax last week at this time  My recommendation is that she follow up in the office for further evaluation at which time we will determine what diagnostic evaluation should be considered    In the meantime I recommend a gentle passive and active range of motion in PT

## 2022-09-12 ENCOUNTER — OFFICE VISIT (OUTPATIENT)
Dept: OBGYN CLINIC | Facility: OTHER | Age: 57
End: 2022-09-12
Payer: OTHER MISCELLANEOUS

## 2022-09-12 VITALS — WEIGHT: 293 LBS | HEIGHT: 67 IN | BODY MASS INDEX: 45.99 KG/M2

## 2022-09-12 DIAGNOSIS — S46.811D TEAR OF RIGHT INFRASPINATUS TENDON, SUBSEQUENT ENCOUNTER: ICD-10-CM

## 2022-09-12 DIAGNOSIS — M75.101 TEAR OF RIGHT SUPRASPINATUS TENDON: Primary | ICD-10-CM

## 2022-09-12 PROCEDURE — 99214 OFFICE O/P EST MOD 30 MIN: CPT | Performed by: ORTHOPAEDIC SURGERY

## 2022-09-12 NOTE — PROGRESS NOTES
Assessment  Diagnoses and all orders for this visit:    Tear of right supraspinatus tendon    Tear of right infraspinatus tendon, subsequent encounter        Discussion and Plan:    · Again, explained to the patient that her MRI findings that there is a massive supraspinatus and infraspinatus tear with retraction and atrophy of both the supraspinatus and infraspinatus  This is not a repairable tear in my opinion and surgical options revolve around use of in space interposition balloon verses reverse total shoulder arthroplasty  Both of these are reasonable options and I would likely lean towards the reverse total shoulder arthroplasty due to the fact that performing the surgery on the right shoulder would remove her independence as she has nonfunctional with her left upper extremity and I would consider the surgery that has the most rapid recovery time and would be the most reliable 1 time surgery we can provide; that would be reverse total shoulder  However I would not offer her surgical treatment at this time and instead focus on appropriate nonoperative care of her cervical spine and right sided radicular symptoms  She is currently waiting to see a neurologist and pain management  · Patient 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 so she has no use of her left upper extremity  · Continue work restrictions as per occupational medicine  · Follow up on an as needed basis    Subjective:   Patient ID: Lili Ma is a 62 y o  female    79-year-old female who presents today follow-up for right shoulder pain due to a work injury on 05/09/2022  She states she is doing the same since last office visit  She has been going to physical therapy without benefit  She was provided with a cortisone injection on 7/11/2022 into her right shoulder without benefit  She feels the pain is taking over her daily activities    She is having numbness and tingling radiating down the right shoulder and numbness in the thumb index and middle fingers  Pain is worse at night is on gabapentin with no relief  She does feel her right arm is weak  She is taking gabapentin at night for her nerve pain  She does have history of a Hill-Sachs deformity and dislocation had surgery in 2010 at [facility]  She states in the past she did seek second opinion her right shoulder for reverse total shoulder surgery was not a surgical candidate due to being at a young age  The following portions of the patient's history were reviewed and updated as appropriate: allergies, current medications, past family history, past medical history, past social history, past surgical history and problem list     Objective:  Ht 5' 7" (1 702 m)   Wt 135 kg (296 lb 9 6 oz)   LMP 06/25/2020   BMI 46 45 kg/m²       Right Shoulder Exam     Tenderness   The patient is experiencing no tenderness  Range of Motion   Right shoulder forward flexion: AROM: 50  PROM: 140  Muscle Strength   Abduction: 4/5     Tests   Drop arm: positive    Other   Erythema: absent  Sensation: normal  Pulse: present            Physical Exam  Constitutional:       Appearance: She is well-developed  Eyes:      Pupils: Pupils are equal, round, and reactive to light  Pulmonary:      Effort: Pulmonary effort is normal       Breath sounds: Normal breath sounds  Skin:     General: Skin is warm and dry  Neurological:      Mental Status: She is alert and oriented to person, place, and time  Psychiatric:         Behavior: Behavior normal          Thought Content: Thought content normal          Judgment: Judgment normal            I have personally reviewed pertinent films in PACS and my interpretation is as follows  MRI Right Shoulder 6/20/2022: Complete tear of the supraspinatus and infraspinatus tendons with muscle atrophy       Scribe Attestation    I,:  Becka Snow am acting as a scribe while in the presence of the attending physician :       I,:  Marily Villela MD personally performed the services described in this documentation    as scribed in my presence :

## 2022-09-13 ENCOUNTER — TELEPHONE (OUTPATIENT)
Dept: OBGYN CLINIC | Facility: HOSPITAL | Age: 57
End: 2022-09-13

## 2022-09-14 ENCOUNTER — OFFICE VISIT (OUTPATIENT)
Dept: SURGERY | Facility: CLINIC | Age: 57
End: 2022-09-14
Payer: COMMERCIAL

## 2022-09-14 VITALS
HEART RATE: 69 BPM | SYSTOLIC BLOOD PRESSURE: 132 MMHG | OXYGEN SATURATION: 97 % | BODY MASS INDEX: 45.99 KG/M2 | WEIGHT: 293 LBS | HEIGHT: 67 IN | DIASTOLIC BLOOD PRESSURE: 78 MMHG | TEMPERATURE: 98.1 F

## 2022-09-14 DIAGNOSIS — T81.89XD SUTURE GRANULOMA, SUBSEQUENT ENCOUNTER: Primary | ICD-10-CM

## 2022-09-14 PROCEDURE — 99214 OFFICE O/P EST MOD 30 MIN: CPT | Performed by: SPECIALIST

## 2022-09-14 RX ORDER — HYDROCODONE BITARTRATE AND ACETAMINOPHEN 5; 300 MG/1; MG/1
1 TABLET ORAL EVERY 4 HOURS PRN
COMMUNITY
Start: 2022-05-20 | End: 2022-10-04

## 2022-09-14 RX ORDER — OXYCODONE HYDROCHLORIDE AND ACETAMINOPHEN 5; 325 MG/1; MG/1
1 TABLET ORAL EVERY 6 HOURS PRN
Qty: 20 TABLET | Refills: 0 | Status: SHIPPED | OUTPATIENT
Start: 2022-09-14

## 2022-09-19 PROBLEM — M54.2 NECK PAIN: Status: ACTIVE | Noted: 2022-09-19

## 2022-09-19 NOTE — ASSESSMENT & PLAN NOTE
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  · On exam, cannot abduct shoulder, with passive movement can abduct about 45 degrees before stopping due to pain   is 4/5  Deltoid, biceps, triceps intact  Intermittent numbness and tingling to thumb, index and middle fingers  Left arm plegic  Some wrist and IO movement  Imaging:  · MRI cervical spine w/o, 5/11/2022: 1  Mild spondylosis  2  No significant foramina stenosis  Plan:  · Reviewed imaging extensively with patient  · Discussed that do not appreciate any findings on MRI that would explain numbness to right arm  · Reviewed that she may be developing some sort of neuropathy (ulnar or radial) secondary to keeping her arm flexed at the elbow for most of the time due to pain for 4 months  · She has a second opinion scheduled with another orthopedic surgeon  I recommend discussing utility of EMG however suspect this may not be clinically useful (patient likely cannot undergo right shoulder surgery as then she would use utility of both arms)  · Follow up as needed

## 2022-09-21 ENCOUNTER — CONSULT (OUTPATIENT)
Dept: NEUROSURGERY | Facility: CLINIC | Age: 57
End: 2022-09-21
Payer: COMMERCIAL

## 2022-09-21 VITALS
BODY MASS INDEX: 45.99 KG/M2 | DIASTOLIC BLOOD PRESSURE: 100 MMHG | RESPIRATION RATE: 18 BRPM | HEART RATE: 80 BPM | TEMPERATURE: 98 F | HEIGHT: 67 IN | SYSTOLIC BLOOD PRESSURE: 142 MMHG | WEIGHT: 293 LBS

## 2022-09-21 DIAGNOSIS — R20.0 RIGHT ARM NUMBNESS: Primary | ICD-10-CM

## 2022-09-21 PROCEDURE — 99203 OFFICE O/P NEW LOW 30 MIN: CPT | Performed by: NURSE PRACTITIONER

## 2022-09-21 NOTE — PROGRESS NOTES
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  · On exam, cannot abduct shoulder, with passive movement can abduct about 45 degrees before stopping due to pain   is 4/5  Deltoid, biceps, triceps intact  Intermittent numbness and tingling to thumb, index and middle fingers  Left arm plegic  Some wrist and IO movement  Imaging:  · MRI cervical spine w/o, 5/11/2022: 1  Mild spondylosis  2  No significant foramina stenosis  Plan:  · Reviewed imaging extensively with patient  · Discussed that do not appreciate any findings on MRI that would explain numbness to right arm  · Reviewed that she may be developing some sort of neuropathy (ulnar or radial) secondary to keeping her arm flexed at the elbow for most of the time due to pain for 4 months  · She has a second opinion scheduled with another orthopedic surgeon  I recommend discussing utility of EMG however suspect this may not be clinically useful (patient likely cannot undergo right shoulder surgery as then she would use utility of both arms)  · Follow up as needed  Diagnoses and all orders for this visit:    Right arm numbness          I spent 30 minutes with the patient today in which >50% of the time was spent counseling/coordination of care regarding diagnosis, imaging review, symptoms and treatment plan  CHIEF COMPLAINT    No chief complaint on file        HISTORY    History of Present Illness     62y o  year old female With past medical history of psoriasis, 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, asthma, status post gastric bypass, anemia, chronic pain, possible clotting disorder, presents as referral from occupational health for evaluation of right arm weakness and numbness  In May of this year she was attempting to lift a box of heavy files at her job and fell into a shelf  She remained in the hospital for a week for treatment of this  She was ultimately noted with right supraspinatus and infraspinatus tears  She is been evaluated by Orthopedic surgery and recommended to defer any surgical intervention due to this resulting in nonweightbearing and without use of her right arm for the postoperative period which would then result in not having use of both arms for an extended period of time  She presents with an MRI of her cervical spine to evaluate whether some of her symptoms of intermittent numbness and tingling to her right arm are result of cervical spine disease  She denies any neck pain but continues to endorse significant tenderness to her right shoulder  She can not really abduct her arm at all without any pain  The pain continues to be severe and she is taking narcotic medications for relief  She is trialed physical therapy as well as gabapentin without relief  She states that the numbness and tingling in her thumb, middle finger and index finger are worse after driving or when she is lying down at night  She states she has trouble gripping things and is often dropping things out of her right arm  She lives at home alone  She has a sister who lives an hour away  See Discussion    REVIEW OF SYSTEMS    Review of Systems   Gastrointestinal: Positive for nausea  Musculoskeletal: Positive for neck pain (neck pain (sometimes) has gotten increasing worse the last ten days, )          Right shoulder-fell into shelf spent a week in the hospital and 1 week at rehab  burning and tingling right arm (elbow down) & thumb,index and middle finger (difficult , dropping items) mostly at night, neck pain (sometimes) has gotten increasing worse the last ten days,   unable to drive    Symptoms since accident in may 2022    Medications: oxycodone     Physical therapy: YES, STARTED YESTERDAY (7/20/22) FOR HER NECK AT     Epidural injection: NO    No previous surgeries   Neurological: Positive for dizziness, weakness, light-headedness and numbness (burning and tingling right arm (elbow down) & thumb,index and middle finger (difficult , dropping items all the time) )  Negative for headaches  Hematological:        Neg AC     All other systems reviewed and are negative  ROS reviewed and edited as needed       Meds/Allergies     Current Outpatient Medications   Medication Sig Dispense Refill    budesonide-formoterol (SYMBICORT) 80-4 5 MCG/ACT inhaler Inhale 2 puffs 2 (two) times a day      Cholecalciferol 50 MCG (2000 UT) CAPS Take 1 capsule by mouth 5000 units daily      HYDROcodone-acetaminophen (XODOL) 5-300 MG per tablet Take 1 tablet by mouth every 4 (four) hours as needed      loratadine (CLARITIN) 10 mg tablet Take 10 mg by mouth 2 (two) times a day      Methotrexate Sodium (methotrexate, PF,) 250 MG/10ML injection Inject 15 mg under the skin      omeprazole (PriLOSEC) 20 mg delayed release capsule Take 20 mg by mouth every morning       oxyCODONE-acetaminophen (Percocet) 5-325 mg per tablet Take 1 tablet by mouth every 6 (six) hours as needed for moderate pain Max Daily Amount: 4 tablets 20 tablet 0    secukinumab (COSENTYX) 150 mg/mL SOAJ injection Inject 300 mg under the skin every 28 days       amLODIPine (NORVASC) 5 mg tablet Take 5 mg by mouth daily (Patient not taking: No sig reported)      Iodoquinol-Hydrocortisone-Aloe 1-1 9 % CREA Apply topically 4 (four) times a day (Patient not taking: Reported on 9/21/2022) 40 g 1    sertraline (ZOLOFT) 50 mg tablet Take 50 mg by mouth daily (Patient not taking: No sig reported)       No current facility-administered medications for this visit  Allergies   Allergen Reactions    Penicillins Other (See Comments) and Anaphylaxis     "PARALYZES HER"  Was paralized    "PARALYZES HER"  Was paralized  "PARALYZES HER"  Was paralized  "PARALYZES HER"  Was paralyzed    Morphine Itching    Prednisone Abdominal Pain     Worsening psoriasis;     Has taken it if needed/as prescribed       PAST HISTORY    Past Medical History:   Diagnosis Date    Anemia     hx of receiving iron infusions    Anxiety     Arthritis     Asthma     Chronic pain disorder     left humerus/right shoulder/psoriatic arhtritis chronic tendonitis    Clotting disorder ([address] )     pt unsure of this,,,"did have to take coumadin for a blood clot/pulm emboli after a fracture surgery around 1995 or so"    Exercise involving cycling     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    GERD (gastroesophageal reflux disease)     History of bariatric surgery 2006    Roun-Y ; weight loss of 400lbs and has gained 30-50 lbs back or so    History of pneumonia     History of transfusion 2000    Limb alert care status     Left arm No BP/Labs or /IV's    Muscle weakness     left Arm    Psoriasis     Psoriatic arthritis ([address] ) 1995    Pulmonary emboli ([address] ) 1995    post humeral frac    Radial nerve palsy, left     arm    Suture granuloma     Vitamin D deficiency     Wears glasses     reading       Past Surgical History:   Procedure Laterality Date    ABDOMINAL SURGERY      CHOLECYSTECTOMY      COLON SURGERY      COLONOSCOPY      ESSURE TUBAL LIGATION      FACIAL/NECK BIOPSY N/A 7/17/2020    Procedure: NECK EXPLORATION;  Surgeon: Saleem Su DO;  [facility];  Service: ENT    FOREIGN BODY REMOVAL N/A 6/4/2021    Procedure: Removal of suture granuloma abdomen;  Surgeon: Lalo Gomez MD;  Location: [address] [place] [address] MAIN OR;  Service: [address] [place] [address] humeral frac repair/rods/metal plates left arm    GASTRIC BYPASS OPEN  2006    HERNIA REPAIR      X3 with mesh    INCISION AND DRAINAGE ANTERIOR NECK Right 2020    Procedure: INCISION AND DRAINAGE  (I&D) NECK;  Surgeon: Amisha Townsend DO;  [facility];  Service: ENT    IVC Devinside    radha filter    PANNICULECTOMY      KS EXCISION SUBMAXILLARY GLAND Right 2020    Procedure: EXCISION SUBMANDIBULAR GLAND;  Surgeon: Amisha Townsend DO;  [facility];  Service: ENT    KS EXPLORE WOUND,ABDOMEN/FLANK/BACK N/A 2020    Procedure: exploation of abdominal wound removal suture granulomas and foreign body;  Surgeon: Suzanna Diaz MD;  Location:  [facility] MAIN OR;  Service: General    KS EXPLORE WOUND,ABDOMEN/FLANK/BACK N/A 3/2/2020    Procedure: EXPLORE WOUND OF BACK LEFT OPEN WITH PACKING;  Surgeon: Suzanna Diaz MD;  Location: [address] [place] [address] MAIN OR;  Service: General    KS EXPLORE WOUND,ABDOMEN/FLANK/BACK N/A 2021    Procedure: WOUND EXPLORATION REMOVAL OF SUTURE GRANULOMA;  Surgeon: Suzanna Diaz MD;  Location: [address] [place] [address] MAIN OR;  Service: General    SMALL INTESTINE SURGERY      VENA CAVA FILTER PLACEMENT         Social History     Tobacco Use    Smoking status: Former Smoker     Packs/day: 0 50     Types: Cigarettes     Quit date: 2021     Years since quittin 5    Smokeless tobacco: Never Used   Vaping Use    Vaping Use: Never used   Substance Use Topics    Alcohol use: Not Currently     Comment: occasionally    Drug use: Never       Family History   Problem Relation Age of Onset    Heart block Mother     Diabetes Mother     No Known Problems Father          Above history personally reviewed  EXAM    Vitals:Blood pressure 142/100, pulse 80, temperature 98 °F (36 7 °C), temperature source Temporal, resp  rate 18, height 5' 7" (1 702 m), weight 134 kg (296 lb), last menstrual period 2020, not currently breastfeeding  ,Body mass index is 46 36 kg/m²  Physical Exam  Constitutional:       Appearance: She is well-developed  She is obese  HENT:      Head: Normocephalic and atraumatic  Eyes:      Extraocular Movements: EOM normal       Pupils: Pupils are equal, round, and reactive to light  Pulmonary:      Effort: Pulmonary effort is normal       Breath sounds: Normal breath sounds  Abdominal:      Palpations: Abdomen is soft  Musculoskeletal:         General: Tenderness, deformity and signs of injury present  Normal range of motion  Cervical back: Normal range of motion and neck supple  Comments: LUE plegia/deformity  RUE cannot abduct without pain   Skin:     General: Skin is warm and dry  Findings: Rash present  Comments: Psoriatic plaques throughout trunk and extremities   Neurological:      General: No focal deficit present  Mental Status: She is alert and oriented to person, place, and time  Mental status is at baseline  Cranial Nerves: No cranial nerve deficit  Sensory: No sensory deficit  Motor: No weakness  Coordination: Coordination normal  Finger-Nose-Finger Test normal       Deep Tendon Reflexes:      Reflex Scores:       Tricep reflexes are 2+ on the right side and 2+ on the left side  Bicep reflexes are 2+ on the right side and 2+ on the left side  Brachioradialis reflexes are 2+ on the right side and 2+ on the left side  Patellar reflexes are 2+ on the right side and 2+ on the left side  Achilles reflexes are 2+ on the right side and 2+ on the left side  Psychiatric:         Speech: Speech normal          Neurologic Exam     Mental Status   Oriented to person, place, and time  Oriented to person  Oriented to place  Oriented to time  Oriented to year, month and date  Registration: recalls 3 of 3 objects  Attention: normal  Concentration: normal    Speech: speech is normal   Level of consciousness: alert  Knowledge: good and consistent with education     Able to name object  Cranial Nerves     CN III, IV, VI   Pupils are equal, round, and reactive to light  Extraocular motions are normal    Right pupil: Size: 3 mm  Shape: regular  Reactivity: brisk  Consensual response: intact  Accommodation: intact  Left pupil: Size: 3 mm  Shape: regular  Reactivity: brisk  Consensual response: intact  Accommodation: intact  Nystagmus: none   Diplopia: none  Conjugate gaze: present    CN V   Right facial sensation deficit: none  Left facial sensation deficit: none    CN VII   Facial expression full, symmetric       CN VIII   Hearing: intact    CN IX, X   Palate: symmetric    CN XI   Right sternocleidomastoid strength: normal  Left sternocleidomastoid strength: normal  Right trapezius strength: normal  Left trapezius strength: normal    CN XII   Tongue: not atrophic  Fasciculations: absent  Tongue deviation: none    Motor Exam   Muscle bulk: normal  Overall muscle tone: normal  Right arm pronator drift: absent  Left arm pronator drift: absent    Strength   Right deltoid: 4/5  Left deltoid: 5/5  Right biceps: 4/5  Left biceps: 0/5  Right triceps: 4/5  Left triceps: 0/5  Right wrist flexion: 4/5  Left wrist flexion: 3/5  Right wrist extension: 4/5  Left wrist extension: 2/5  Right interossei: 4/5  Left interossei: 2/5  Right quadriceps: 5/5  Left quadriceps: 5/5  Right hamstrin/5  Left hamstrin/5  Right anterior tibial: 5/5  Left anterior tibial: 5/5  Right posterior tibial: 5/5  Left posterior tibial: 5/5  Right peroneal: 5/5  Left peroneal: 5/5  Right gastroc: 5/5  Left gastroc: 5/5    Sensory Exam   Light touch normal    Proprioception normal      Gait, Coordination, and Reflexes     Coordination   Finger to nose coordination: normal    Tremor   Resting tremor: absent  Intention tremor: absent  Action tremor: absent    Reflexes   Right brachioradialis: 2+  Left brachioradialis: 2+  Right biceps: 2+  Left biceps: 2+  Right triceps: 2+  Left triceps: 2+  Right patellar: 2+  Left patellar: 2+  Right achilles: 2+  Left achilles: 2+  Right : 2+  Left : 2+  Right Hua: absent  Left Hua: absent  Right ankle clonus: absent  Left ankle clonus: absent        MEDICAL DECISION MAKING    Imaging Studies:     No results found  I have personally reviewed pertinent reports     and I have personally reviewed pertinent films in PACS

## 2022-09-22 RX ORDER — LEVOFLOXACIN 5 MG/ML
750 INJECTION, SOLUTION INTRAVENOUS ONCE
Status: CANCELLED | OUTPATIENT
Start: 2022-10-06

## 2022-09-22 NOTE — H&P
Chief Complaint:  Recurrent suture granuloma versus infected mesh      History of Present Illness:  Alvaro Diaz is a 70-year-old white female 1 of our favorite patients  on whom we performed many procedures over the past many years  She has a long and colorful past medical history  Open Vicenta-en-Y gastric bypass at [facility] in [place] in 2000  At that time she weighed about 600 lb  She currently weighs about 290 lb  She underwent a revision in 2006  She underwent a tummy tuck 2007  Incisional hernia repair at the same time  Subsequent incisional hernia repair with mesh  Laparoscopic assisted repair of hernia and lysis of adhesions 2008  Two thousand fifteen removal of suture granuloma from the incision  Laparoscopic cholecystectomy 2016  And on and on and so forth and so forth  She has had problem with recurrent suture granulomas at the superior aspect of her mesh which is right at the level of the costal sternal margin  She has had sutures removed pieces of mesh removed etcetera etcetera  Unfortunately this has recurred  The complicating factor in this is that she has 1 functional arm  Her left arm is nonfunctional from severe nerve diverge many years ago  She fell recently and developed significant right shoulder problems  She was hospitalized for a week for this  The bottom line is she needs shoulder surgery  She would like to have any issues with this draining wound taking care of prior to undergoing shoulder surgery  She is here today in regards to this  She also has a long history of psoriatic arthritis and currently is on Humira and Cosentyx            Past Medical History:   Past Medical History:   Diagnosis Date    Anemia     hx of receiving iron infusions    Anxiety     Arthritis     Asthma     Chronic pain disorder     left humerus/right shoulder/psoriatic arhtritis chronic tendonitis    Clotting disorder [address] [address] )     pt unsure of this,,,"did have to take coumadin for a blood clot/pulm emboli after a fracture surgery around 1995 or so"    Exercise involving cycling     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    GERD (gastroesophageal reflux disease)     History of bariatric surgery 2006    Roun-Y ; weight loss of 400lbs and has gained 30-50 lbs back or so    History of pneumonia     History of transfusion 2000    Limb alert care status     Left arm No BP/Labs or /IV's    Muscle weakness     left Arm    Psoriasis     P[facility] arthritis [facility] [address] ) 1995    Pulmonary emboli ([facility] [address] ) 1995    post humeral frac    Radial nerve palsy, left     arm    Suture granuloma     Vitamin D deficiency     Wears glasses     reading         Past Surgical History:    Past Surgical History:   Procedure Laterality Date    ABDOMINAL SURGERY      CHOLECYSTECTOMY      COLON SURGERY      COLONOSCOPY      ESSURE TUBAL LIGATION      FACIAL/NECK BIOPSY N/A 7/17/2020    Procedure: NECK EXPLORATION;  Surgeon: Mariana Hall DO;  [facility];  Service: ENT    FOREIGN BODY REMOVAL N/A 6/4/2021    Procedure: Removal of suture granuloma abdomen;  Surgeon: Moustapha Perez MD;  Location: [facility] MAIN OR;  Service: General    FRACTURE SURGERY  1995    humeral frac repair/rods/metal plates left arm    GASTRIC BYPASS OPEN  2006    HERNIA REPAIR      X3 with mesh    INCISION AND DRAINAGE ANTERIOR NECK Right 6/16/2020    Procedure: INCISION AND DRAINAGE  (I&D) NECK;  Surgeon: Mariana Hall DO;  [facility];  Service: ENT    IVC Devinside    radha filter    PANNICULECTOMY      [place] EXCISION SUBMAXILLARY GLAND Right 6/12/2020    Procedure: EXCISION SUBMANDIBULAR GLAND;  Surgeon: Mariana Hall DO;  [facility];  Service: ENT    OH EXPLORE WOUND,ABDOMEN/FLANK/BACK N/A 1/31/2020    Procedure: exploation of abdominal wound removal suture granulomas and foreign body;  Surgeon: Moustapha Perez MD;  Location:  MAIN OR;  Service: General    OH EXPLORE WOUND,ABDOMEN/FLANK/BACK N/A 3/2/2020    Procedure: EXPLORE WOUND OF BACK LEFT OPEN WITH PACKING;  Surgeon: Marjorie Burgess MD;  Location: [facility] MAIN OR;  Service: General    MA EXPLORE WOUND,ABDOMEN/FLANK/BACK N/A 11/18/2021    Procedure: WOUND EXPLORATION REMOVAL OF SUTURE GRANULOMA;  Surgeon: Marjorie Burgess MD;  Location:  MAIN OR;  Service: General    SMALL INTESTINE SURGERY      VENA CAVA FILTER PLACEMENT           Allergies: Allergies   Allergen Reactions    Penicillins Other (See Comments) and Anaphylaxis     "PARALYZES HER"  Was paralized    "PARALYZES HER"  Was paralized  "PARALYZES HER"  Was paralized  "PARALYZES HER"  Was paralyzed    Morphine Itching    Prednisone Abdominal Pain     Worsening psoriasis;     Has taken it if needed/as prescribed         Medications:    Current Outpatient Medications:     budesonide-formoterol (SYMBICORT) 80-4 5 MCG/ACT inhaler, Inhale 2 puffs 2 (two) times a day, Disp: , Rfl:     Cholecalciferol 50 MCG (2000 UT) CAPS, Take 1 capsule by mouth 5000 units daily, Disp: , Rfl:     Iodoquinol-Hydrocortisone-Aloe 1-1 9 % CREA, Apply topically 4 (four) times a day (Patient not taking: Reported on 9/21/2022), Disp: 40 g, Rfl: 1    loratadine (CLARITIN) 10 mg tablet, Take 10 mg by mouth 2 (two) times a day, Disp: , Rfl:     Methotrexate Sodium (methotrexate, PF,) 250 MG/10ML injection, Inject 15 mg under the skin, Disp: , Rfl:     omeprazole (PriLOSEC) 20 mg delayed release capsule, Take 20 mg by mouth every morning , Disp: , Rfl:     oxyCODONE-acetaminophen (Percocet) 5-325 mg per tablet, Take 1 tablet by mouth every 6 (six) hours as needed for moderate pain Max Daily Amount: 4 tablets, Disp: 20 tablet, Rfl: 0    secukinumab (COSENTYX) 150 mg/mL SOAJ injection, Inject 300 mg under the skin every 28 days , Disp: , Rfl:     amLODIPine (NORVASC) 5 mg tablet, Take 5 mg by mouth daily (Patient not taking: No sig reported), Disp: , Rfl:     HYDROcodone-acetaminophen (Kylie Ghada) 5-300 MG per tablet, Take 1 tablet by mouth every 4 (four) hours as needed, Disp: , Rfl:     sertraline (ZOLOFT) 50 mg tablet, Take 50 mg by mouth daily (Patient not taking: No sig reported), Disp: , Rfl:       Social History:  Social History     Social History     Substance and Sexual Activity   Alcohol Use Not Currently    Comment: occasionally     Social History     Substance and Sexual Activity   Drug Use Never     Social History     Tobacco Use   Smoking Status Former Smoker    Packs/day: 0 50    Types: Cigarettes    Quit date: 2021    Years since quittin 5   Smokeless Tobacco Never Used         Family History:    Family History   Problem Relation Age of Onset    Heart block Mother     Diabetes Mother     No Known Problems Father          Review of Systems:    Right shoulder pain and weakness  Left upper extremity weakness  Arthralgias  Midline incisional pain  No weight loss weight gain fever chills night sweats chest pain nausea vomiting diarrhea constipation shortness of breath headaches blurry vision double vision sore throat etcetera    Vitals:  Vitals:    22 1255   BP: 132/78   Pulse: 69   Temp: 98 1 °F (36 7 °C)   SpO2: 97%       Physical Exam:  Middle-aged obese white female awake alert no distress  5 ft 7 in 296 lb  Vital signs as above    Skin warm dry  Diffuse generalized psoriatic rash  Chronic flaky skin  No drainage  Head normocephalic and atraumatic  Eyes IVAN a m  Intact  Ears nose within normal limits  Throat gag reflex intact  Neck no masses thyromegaly lymphadenopathy palpable  Back no CVA or spinal tenderness  Lungs clear to a and P  Cor regular rate and rhythm no murmurs carotid bruits  Abdomen multiple scars are noted  Obese  Midline incision demonstrates the most superior aspect an open area draining serous fluid consistent with either a suture granuloma or mesh  No erythema no pus    There was also a firm area in her left upper quadrant that she says is new  Extremities left upper extremity flaccid paralysis  Right upper extremity has limited movement secondary to pain  She has bilateral lower extremity 1+ pitting edema  Neurologically A&O x3 cranial nerves 2-12 intact  Lymphatics no lymphadenopathy palpable  Lab Results: I have personally reviewed pertinent reports  See below  Imaging: I have personally reviewed pertinent reports  EKG, Pathology, and Other Studies: I have personally reviewed pertinent reports  No visits with results within 1 Day(s) from this visit  Latest known visit with results is:   Admission on 11/18/2021, Discharged on 11/18/2021   Component Date Value    Case Report 11/18/2021                      Value:Surgical Pathology Report                         Case: [id number]                                   Authorizing Provider:  Tano Juan MD           Collected:           11/18/2021 4236              Ordering Location:     [facility] Received:            11/18/2021 1406                                     Heart Operating Room                                                         Pathologist:           Brandan Diaz MD                                                                 Specimen:    Foreign body, foreign body of abdomen                                                      Final Diagnosis 11/18/2021                      Value: This result contains rich text formatting which cannot be displayed here   Additional Information 11/18/2021                      Value: This result contains rich text formatting which cannot be displayed here  [employer] Description 11/18/2021                      Value: This result contains rich text formatting which cannot be displayed here  Impression:  Recurrent suture granuloma versus low-grade mesh infection  Plan:  Explore the wound under local anesthesia with IV sedation at the earliest possible date

## 2022-09-28 ENCOUNTER — ANESTHESIA EVENT (OUTPATIENT)
Dept: PERIOP | Facility: HOSPITAL | Age: 57
End: 2022-09-28
Payer: COMMERCIAL

## 2022-10-03 ENCOUNTER — OFFICE VISIT (OUTPATIENT)
Dept: LAB | Facility: HOSPITAL | Age: 57
End: 2022-10-03
Payer: COMMERCIAL

## 2022-10-03 ENCOUNTER — APPOINTMENT (OUTPATIENT)
Dept: LAB | Facility: HOSPITAL | Age: 57
End: 2022-10-03
Payer: COMMERCIAL

## 2022-10-03 DIAGNOSIS — T81.89XA SUTURE GRANULOMA: ICD-10-CM

## 2022-10-03 LAB
ALBUMIN SERPL BCP-MCNC: 3.2 G/DL (ref 3.5–5)
ALP SERPL-CCNC: 260 U/L (ref 46–116)
ALT SERPL W P-5'-P-CCNC: 33 U/L (ref 12–78)
ANION GAP SERPL CALCULATED.3IONS-SCNC: 8 MMOL/L (ref 4–13)
AST SERPL W P-5'-P-CCNC: 36 U/L (ref 5–45)
BASOPHILS # BLD AUTO: 0.03 THOUSANDS/ΜL (ref 0–0.1)
BASOPHILS NFR BLD AUTO: 1 % (ref 0–1)
BILIRUB SERPL-MCNC: 0.46 MG/DL (ref 0.2–1)
BUN SERPL-MCNC: 7 MG/DL (ref 5–25)
CALCIUM ALBUM COR SERPL-MCNC: 9.8 MG/DL (ref 8.3–10.1)
CALCIUM SERPL-MCNC: 9.2 MG/DL (ref 8.3–10.1)
CHLORIDE SERPL-SCNC: 101 MMOL/L (ref 96–108)
CO2 SERPL-SCNC: 29 MMOL/L (ref 21–32)
CREAT SERPL-MCNC: 0.68 MG/DL (ref 0.6–1.3)
EOSINOPHIL # BLD AUTO: 0.07 THOUSAND/ΜL (ref 0–0.61)
EOSINOPHIL NFR BLD AUTO: 1 % (ref 0–6)
ERYTHROCYTE [DISTWIDTH] IN BLOOD BY AUTOMATED COUNT: 14 % (ref 11.6–15.1)
GFR SERPL CREATININE-BSD FRML MDRD: 97 ML/MIN/1.73SQ M
GLUCOSE SERPL-MCNC: 107 MG/DL (ref 65–140)
HCT VFR BLD AUTO: 42.6 % (ref 34.8–46.1)
HGB BLD-MCNC: 13.7 G/DL (ref 11.5–15.4)
IMM GRANULOCYTES # BLD AUTO: 0.01 THOUSAND/UL (ref 0–0.2)
IMM GRANULOCYTES NFR BLD AUTO: 0 % (ref 0–2)
LYMPHOCYTES # BLD AUTO: 0.99 THOUSANDS/ΜL (ref 0.6–4.47)
LYMPHOCYTES NFR BLD AUTO: 20 % (ref 14–44)
MCH RBC QN AUTO: 31.7 PG (ref 26.8–34.3)
MCHC RBC AUTO-ENTMCNC: 32.2 G/DL (ref 31.4–37.4)
MCV RBC AUTO: 99 FL (ref 82–98)
MONOCYTES # BLD AUTO: 0.47 THOUSAND/ΜL (ref 0.17–1.22)
MONOCYTES NFR BLD AUTO: 10 % (ref 4–12)
NEUTROPHILS # BLD AUTO: 3.35 THOUSANDS/ΜL (ref 1.85–7.62)
NEUTS SEG NFR BLD AUTO: 68 % (ref 43–75)
NRBC BLD AUTO-RTO: 0 /100 WBCS
PLATELET # BLD AUTO: 298 THOUSANDS/UL (ref 149–390)
PMV BLD AUTO: 9.9 FL (ref 8.9–12.7)
POTASSIUM SERPL-SCNC: 3.7 MMOL/L (ref 3.5–5.3)
PROT SERPL-MCNC: 7.9 G/DL (ref 6.4–8.4)
RBC # BLD AUTO: 4.32 MILLION/UL (ref 3.81–5.12)
SODIUM SERPL-SCNC: 138 MMOL/L (ref 135–147)
WBC # BLD AUTO: 4.92 THOUSAND/UL (ref 4.31–10.16)

## 2022-10-03 PROCEDURE — 85025 COMPLETE CBC W/AUTO DIFF WBC: CPT

## 2022-10-03 PROCEDURE — 93005 ELECTROCARDIOGRAM TRACING: CPT

## 2022-10-03 PROCEDURE — 80053 COMPREHEN METABOLIC PANEL: CPT

## 2022-10-03 PROCEDURE — 36415 COLL VENOUS BLD VENIPUNCTURE: CPT

## 2022-10-04 ENCOUNTER — OFFICE VISIT (OUTPATIENT)
Dept: SURGERY | Facility: CLINIC | Age: 57
End: 2022-10-04
Payer: COMMERCIAL

## 2022-10-04 VITALS
OXYGEN SATURATION: 97 % | TEMPERATURE: 98.3 F | HEIGHT: 67 IN | HEART RATE: 71 BPM | BODY MASS INDEX: 45.99 KG/M2 | WEIGHT: 293 LBS

## 2022-10-04 DIAGNOSIS — T81.89XD SUTURE GRANULOMA, SUBSEQUENT ENCOUNTER: Primary | ICD-10-CM

## 2022-10-04 LAB
ATRIAL RATE: 66 BPM
P AXIS: 15 DEGREES
PR INTERVAL: 192 MS
QRS AXIS: 2 DEGREES
QRSD INTERVAL: 78 MS
QT INTERVAL: 436 MS
QTC INTERVAL: 457 MS
T WAVE AXIS: 26 DEGREES
VENTRICULAR RATE: 66 BPM

## 2022-10-04 PROCEDURE — 99212 OFFICE O/P EST SF 10 MIN: CPT | Performed by: SPECIALIST

## 2022-10-04 PROCEDURE — 93010 ELECTROCARDIOGRAM REPORT: CPT | Performed by: INTERNAL MEDICINE

## 2022-10-04 RX ORDER — IXEKIZUMAB 80 MG/ML
80 INJECTION, SOLUTION SUBCUTANEOUS
COMMUNITY
Start: 2022-09-23

## 2022-10-04 RX ORDER — ERGOCALCIFEROL 1.25 MG/1
CAPSULE ORAL
COMMUNITY
Start: 2022-09-15

## 2022-10-04 NOTE — PROGRESS NOTES
Arnaldo Gautam presents today for a unscheduled preoperative visit prior to undergoing wound exploration for suture granulomas in 2 days  She developed a new opening to the left of the midline in the epigastric area  She said it drained a significant amount of serosanguineous fluid  She said it "squirted out" at 4:30 a m  when she went to the bathroom  It has been draining ever since  Physical exam:  Middle-aged obese white female awake alert no distress     Abdomen multiple scars are noted  To the left of the midline there is a firm area with a small opening draining serosanguineous fluid  It is not purulent  No foul odor  Impression:  Additional suture granulomas? Plan: We will explore this area at the time of her upcoming surgery

## 2022-10-04 NOTE — PRE-PROCEDURE INSTRUCTIONS
Pre-Surgery Instructions:   Medication Instructions    budesonide-formoterol (SYMBICORT) 80-4 5 MCG/ACT inhaler Take day of surgery   Cholecalciferol 50 MCG (2000 UT) CAPS Stop taking 7 days prior to surgery   ergocalciferol (VITAMIN D2) 50,000 units Hold day of surgery   loratadine (CLARITIN) 10 mg tablet Take day of surgery   Methotrexate Sodium (methotrexate, PF,) 250 MG/10ML injection Hold day of surgery   omeprazole (PriLOSEC) 20 mg delayed release capsule Take day of surgery   oxyCODONE-acetaminophen (Percocet) 5-325 mg per tablet Uses PRN- OK to take day of surgery           Reviewed with patient, in detail, instructions from "My Surgical Experience"  Instructed to avoid all  OTC vitamins/supplements and NSAIDS from now until after surgery per anesthesia guidelines  Tylenol ok to take PRN  Advised patient that [facility] will call with surgery arrival time and hospital directions the business day prior to surgery  Advised patient nothing eat or drink after midnight prior to surgery  Instructed to call surgeon's office in meantime with any new illnesses/exposure  Patient verbalized understanding of current visitor restrictions/masking guidelines and advised that he/she can confirm these at time of arrival call with [facility]  Patient verbalized understanding and knows to call surgeon's office with any additional questions prior to surgery

## 2022-10-04 NOTE — H&P (VIEW-ONLY)
Juve Swanson presents today for a unscheduled preoperative visit prior to undergoing wound exploration for suture granulomas in 2 days  She developed a new opening to the left of the midline in the epigastric area  She said it drained a significant amount of serosanguineous fluid  She said it "squirted out" at 4:30 a m  when she went to the bathroom  It has been draining ever since  Physical exam:  Middle-aged obese white female awake alert no distress     Abdomen multiple scars are noted  To the left of the midline there is a firm area with a small opening draining serosanguineous fluid  It is not purulent  No foul odor  Impression:  Additional suture granulomas? Plan: We will explore this area at the time of her upcoming surgery

## 2022-10-05 NOTE — ANESTHESIA PREPROCEDURE EVALUATION
Procedure:  WOUND EXPLORATION AND REMOVAL SUTURE GRANULOMA, REMOVAL OF FOREIGN BODY OF ABDOMEN (N/A Abdomen)    Relevant Problems   CARDIO   (+) Deep vein thrombosis (DVT) of lower extremity (HCC)      GI/HEPATIC   (+) Gastroesophageal reflux disease without esophagitis   (+) H/O bariatric surgery      HEMATOLOGY   (+) Coagulation disorder (HCC)   (+) Immunocompromised (HCC)   (+) Iron deficiency anemia      MUSCULOSKELETAL   (+) Arthritis      NEURO/PSYCH   (+) Other chronic pain      PULMONARY   (+) Pulmonary emphysema (HCC)   (+) Smoking      Respiratory   (+) Acute maxillary sinusitis      Digestive   (+) Submandibular sialoadenitis      Musculoskeletal and Integument   (+) Internal derangement of shoulder, right   (+) Tear of right infraspinatus tendon   (+) Tear of right supraspinatus tendon      Other   (+) Back wound   (+) Elevated TSH   (+) Hyponatremia   (+) Insomnia   (+) Morbid (severe) obesity due to excess calories (HCC)   (+) Neck abscess   (+) Neck pain   (+) Open abdominal wall wound   (+) Tobacco dependence   (+) Wound dehiscence        Physical Exam    Airway    Mallampati score: II  TM Distance: >3 FB  Neck ROM: full     Dental   No notable dental hx     Cardiovascular  Cardiovascular exam normal    Pulmonary  Pulmonary exam normal     Other Findings        Anesthesia Plan  ASA Score- 3     Anesthesia Type- IV sedation with anesthesia with ASA Monitors  Additional Monitors:   Airway Plan: ETT and LMA  Plan Factors-Exercise tolerance (METS): <4 METS  Chart reviewed  Existing labs reviewed  Patient is not a current smoker  Patient not instructed to abstain from smoking on day of procedure  Patient did not smoke on day of surgery  Induction- intravenous  Postoperative Plan-     Informed Consent- Anesthetic plan and risks discussed with patient  I personally reviewed this patient with the CRNA  Discussed and agreed on the Anesthesia Plan with the CRNA  Javier Calvo

## 2022-10-06 ENCOUNTER — HOSPITAL ENCOUNTER (OUTPATIENT)
Facility: HOSPITAL | Age: 57
Setting detail: OUTPATIENT SURGERY
Discharge: HOME | End: 2022-10-06
Attending: SPECIALIST | Admitting: SPECIALIST
Payer: COMMERCIAL

## 2022-10-06 ENCOUNTER — ANESTHESIA (OUTPATIENT)
Dept: PERIOP | Facility: HOSPITAL | Age: 57
End: 2022-10-06
Payer: COMMERCIAL

## 2022-10-06 VITALS
BODY MASS INDEX: 45.99 KG/M2 | OXYGEN SATURATION: 98 % | DIASTOLIC BLOOD PRESSURE: 78 MMHG | HEART RATE: 50 BPM | SYSTOLIC BLOOD PRESSURE: 142 MMHG | TEMPERATURE: 96.7 F | HEIGHT: 67 IN | RESPIRATION RATE: 16 BRPM | WEIGHT: 293 LBS

## 2022-10-06 DIAGNOSIS — T81.89XA SUTURE GRANULOMA: ICD-10-CM

## 2022-10-06 DIAGNOSIS — S31.109A OPEN ABDOMINAL WALL WOUND: Primary | ICD-10-CM

## 2022-10-06 PROCEDURE — 10140 I&D HMTMA SEROMA/FLUID COLLJ: CPT | Performed by: SPECIALIST

## 2022-10-06 PROCEDURE — 11043 DBRDMT MUSC&/FSCA 1ST 20/<: CPT | Performed by: SPECIALIST

## 2022-10-06 PROCEDURE — 20520 RMVL FB MUSC/TDN SIMPLE: CPT | Performed by: SPECIALIST

## 2022-10-06 PROCEDURE — 88304 TISSUE EXAM BY PATHOLOGIST: CPT | Performed by: PATHOLOGY

## 2022-10-06 RX ORDER — OXYCODONE HYDROCHLORIDE AND ACETAMINOPHEN 5; 325 MG/1; MG/1
1 TABLET ORAL EVERY 4 HOURS PRN
Status: DISCONTINUED | OUTPATIENT
Start: 2022-10-06 | End: 2022-10-06 | Stop reason: HOSPADM

## 2022-10-06 RX ORDER — FENTANYL CITRATE 50 UG/ML
INJECTION, SOLUTION INTRAMUSCULAR; INTRAVENOUS AS NEEDED
Status: DISCONTINUED | OUTPATIENT
Start: 2022-10-06 | End: 2022-10-06

## 2022-10-06 RX ORDER — MIDAZOLAM HYDROCHLORIDE 2 MG/2ML
INJECTION, SOLUTION INTRAMUSCULAR; INTRAVENOUS AS NEEDED
Status: DISCONTINUED | OUTPATIENT
Start: 2022-10-06 | End: 2022-10-06

## 2022-10-06 RX ORDER — BUPIVACAINE HYDROCHLORIDE 5 MG/ML
INJECTION, SOLUTION EPIDURAL; INTRACAUDAL AS NEEDED
Status: DISCONTINUED | OUTPATIENT
Start: 2022-10-06 | End: 2022-10-06 | Stop reason: HOSPADM

## 2022-10-06 RX ORDER — DIPHENHYDRAMINE HYDROCHLORIDE 50 MG/ML
12.5 INJECTION INTRAMUSCULAR; INTRAVENOUS ONCE AS NEEDED
Status: DISCONTINUED | OUTPATIENT
Start: 2022-10-06 | End: 2022-10-06 | Stop reason: HOSPADM

## 2022-10-06 RX ORDER — ALBUTEROL SULFATE 90 UG/1
2 AEROSOL, METERED RESPIRATORY (INHALATION) EVERY 6 HOURS PRN
COMMUNITY

## 2022-10-06 RX ORDER — OXYCODONE HYDROCHLORIDE AND ACETAMINOPHEN 5; 325 MG/1; MG/1
1 TABLET ORAL EVERY 6 HOURS PRN
Qty: 30 TABLET | Refills: 0 | Status: SHIPPED | OUTPATIENT
Start: 2022-10-06 | End: 2022-10-16

## 2022-10-06 RX ORDER — SODIUM CHLORIDE, SODIUM LACTATE, POTASSIUM CHLORIDE, CALCIUM CHLORIDE 600; 310; 30; 20 MG/100ML; MG/100ML; MG/100ML; MG/100ML
125 INJECTION, SOLUTION INTRAVENOUS CONTINUOUS
Status: DISCONTINUED | OUTPATIENT
Start: 2022-10-06 | End: 2022-10-06 | Stop reason: HOSPADM

## 2022-10-06 RX ORDER — OXYCODONE HYDROCHLORIDE AND ACETAMINOPHEN 5; 325 MG/1; MG/1
2 TABLET ORAL EVERY 4 HOURS PRN
Status: DISCONTINUED | OUTPATIENT
Start: 2022-10-06 | End: 2022-10-06 | Stop reason: HOSPADM

## 2022-10-06 RX ORDER — LIDOCAINE HYDROCHLORIDE 10 MG/ML
INJECTION, SOLUTION EPIDURAL; INFILTRATION; INTRACAUDAL; PERINEURAL AS NEEDED
Status: DISCONTINUED | OUTPATIENT
Start: 2022-10-06 | End: 2022-10-06

## 2022-10-06 RX ORDER — PROPOFOL 10 MG/ML
INJECTION, EMULSION INTRAVENOUS AS NEEDED
Status: DISCONTINUED | OUTPATIENT
Start: 2022-10-06 | End: 2022-10-06

## 2022-10-06 RX ORDER — KETOROLAC TROMETHAMINE 30 MG/ML
INJECTION, SOLUTION INTRAMUSCULAR; INTRAVENOUS AS NEEDED
Status: DISCONTINUED | OUTPATIENT
Start: 2022-10-06 | End: 2022-10-06

## 2022-10-06 RX ORDER — ONDANSETRON 2 MG/ML
4 INJECTION INTRAMUSCULAR; INTRAVENOUS EVERY 8 HOURS PRN
Status: DISCONTINUED | OUTPATIENT
Start: 2022-10-06 | End: 2022-10-06 | Stop reason: HOSPADM

## 2022-10-06 RX ORDER — LEVOFLOXACIN 5 MG/ML
750 INJECTION, SOLUTION INTRAVENOUS ONCE
Status: COMPLETED | OUTPATIENT
Start: 2022-10-06 | End: 2022-10-06

## 2022-10-06 RX ORDER — ONDANSETRON 2 MG/ML
INJECTION INTRAMUSCULAR; INTRAVENOUS AS NEEDED
Status: DISCONTINUED | OUTPATIENT
Start: 2022-10-06 | End: 2022-10-06

## 2022-10-06 RX ORDER — ALBUTEROL SULFATE 90 UG/1
AEROSOL, METERED RESPIRATORY (INHALATION) AS NEEDED
Status: DISCONTINUED | OUTPATIENT
Start: 2022-10-06 | End: 2022-10-06

## 2022-10-06 RX ORDER — FENTANYL CITRATE/PF 50 MCG/ML
25 SYRINGE (ML) INJECTION
Status: COMPLETED | OUTPATIENT
Start: 2022-10-06 | End: 2022-10-06

## 2022-10-06 RX ORDER — MAGNESIUM HYDROXIDE 1200 MG/15ML
LIQUID ORAL AS NEEDED
Status: DISCONTINUED | OUTPATIENT
Start: 2022-10-06 | End: 2022-10-06 | Stop reason: HOSPADM

## 2022-10-06 RX ORDER — PROPOFOL 10 MG/ML
INJECTION, EMULSION INTRAVENOUS CONTINUOUS PRN
Status: DISCONTINUED | OUTPATIENT
Start: 2022-10-06 | End: 2022-10-06

## 2022-10-06 RX ORDER — LIDOCAINE HYDROCHLORIDE 10 MG/ML
INJECTION, SOLUTION EPIDURAL; INFILTRATION; INTRACAUDAL; PERINEURAL AS NEEDED
Status: DISCONTINUED | OUTPATIENT
Start: 2022-10-06 | End: 2022-10-06 | Stop reason: HOSPADM

## 2022-10-06 RX ORDER — HYDROMORPHONE HCL/PF 1 MG/ML
0.5 SYRINGE (ML) INJECTION
Status: DISCONTINUED | OUTPATIENT
Start: 2022-10-06 | End: 2022-10-06 | Stop reason: HOSPADM

## 2022-10-06 RX ADMIN — FENTANYL CITRATE 25 MCG: 50 INJECTION, SOLUTION INTRAMUSCULAR; INTRAVENOUS at 15:21

## 2022-10-06 RX ADMIN — ALBUTEROL SULFATE 4 PUFF: 90 AEROSOL, METERED RESPIRATORY (INHALATION) at 13:03

## 2022-10-06 RX ADMIN — PROPOFOL 30 MG: 10 INJECTION, EMULSION INTRAVENOUS at 13:25

## 2022-10-06 RX ADMIN — OXYCODONE HYDROCHLORIDE AND ACETAMINOPHEN 1 TABLET: 5; 325 TABLET ORAL at 17:57

## 2022-10-06 RX ADMIN — SODIUM CHLORIDE, SODIUM LACTATE, POTASSIUM CHLORIDE, AND CALCIUM CHLORIDE: .6; .31; .03; .02 INJECTION, SOLUTION INTRAVENOUS at 12:50

## 2022-10-06 RX ADMIN — FENTANYL CITRATE 25 MCG: 50 INJECTION, SOLUTION INTRAMUSCULAR; INTRAVENOUS at 13:31

## 2022-10-06 RX ADMIN — PROPOFOL 80 MCG/KG/MIN: 10 INJECTION, EMULSION INTRAVENOUS at 13:21

## 2022-10-06 RX ADMIN — FENTANYL CITRATE 25 MCG: 50 INJECTION, SOLUTION INTRAMUSCULAR; INTRAVENOUS at 15:16

## 2022-10-06 RX ADMIN — FENTANYL CITRATE 100 MCG: 50 INJECTION, SOLUTION INTRAMUSCULAR; INTRAVENOUS at 15:03

## 2022-10-06 RX ADMIN — LIDOCAINE HYDROCHLORIDE 50 MG: 10 INJECTION, SOLUTION EPIDURAL; INFILTRATION; INTRACAUDAL; PERINEURAL at 13:20

## 2022-10-06 RX ADMIN — FENTANYL CITRATE 25 MCG: 50 INJECTION, SOLUTION INTRAMUSCULAR; INTRAVENOUS at 15:31

## 2022-10-06 RX ADMIN — PROPOFOL 50 MG: 10 INJECTION, EMULSION INTRAVENOUS at 13:20

## 2022-10-06 RX ADMIN — KETOROLAC TROMETHAMINE 15 MG: 30 INJECTION, SOLUTION INTRAMUSCULAR; INTRAVENOUS at 14:57

## 2022-10-06 RX ADMIN — LEVOFLOXACIN: 750 INJECTION, SOLUTION INTRAVENOUS at 12:57

## 2022-10-06 RX ADMIN — FENTANYL CITRATE 25 MCG: 50 INJECTION, SOLUTION INTRAMUSCULAR; INTRAVENOUS at 15:26

## 2022-10-06 RX ADMIN — FENTANYL CITRATE 50 MCG: 50 INJECTION, SOLUTION INTRAMUSCULAR; INTRAVENOUS at 13:21

## 2022-10-06 RX ADMIN — FENTANYL CITRATE 25 MCG: 50 INJECTION, SOLUTION INTRAMUSCULAR; INTRAVENOUS at 14:03

## 2022-10-06 RX ADMIN — HYDROMORPHONE HYDROCHLORIDE 0.5 MG: 1 INJECTION, SOLUTION INTRAMUSCULAR; INTRAVENOUS; SUBCUTANEOUS at 15:41

## 2022-10-06 RX ADMIN — PROPOFOL 40 MG: 10 INJECTION, EMULSION INTRAVENOUS at 13:31

## 2022-10-06 RX ADMIN — MIDAZOLAM HYDROCHLORIDE 2 MG: 1 INJECTION, SOLUTION INTRAMUSCULAR; INTRAVENOUS at 13:16

## 2022-10-06 RX ADMIN — ONDANSETRON 4 MG: 2 INJECTION INTRAMUSCULAR; INTRAVENOUS at 14:14

## 2022-10-06 NOTE — NURSING NOTE
Abdominal binder applied in [facility] prior to discharge per Dr Isabel Alexander orders  Pt requesting percocet prior to leaving, has taken percocet before in the past without adverse reactions  AVS printed and reviewed with patient, verbalized understanding

## 2022-10-06 NOTE — ANESTHESIA POSTPROCEDURE EVALUATION
Post-Op Assessment Note    CV Status:  Stable  Pain Score: 5    Pain management: adequate     Mental Status:  Alert and awake   Hydration Status:  Euvolemic   PONV Controlled:  Controlled   Airway Patency:  Patent      Post Op Vitals Reviewed: Yes      Staff: CRNA         No complications documented      /73 (10/06/22 1509)    Temp (!) 97 3 °F (36 3 °C) (10/06/22 1509)    Pulse 69 (10/06/22 1509)   Resp 20 (10/06/22 1509)    SpO2 98 % (10/06/22 1509)

## 2022-10-06 NOTE — OP NOTE
OPERATIVE REPORT  PATIENT NAME: Marcie Chung    :  1965  MRN: [id number]  Pt Location:  OR ROOM 08    SURGERY DATE: 10/6/2022    Surgeon(s) and Role:     Lali Horner MD - Primary    Preop Diagnosis:  Suture granuloma [T81 89XA]    Post-Op Diagnosis Codes:     * Suture granuloma [T81 89XA]    Procedure(s) (LRB):  WOUND EXPLORATION AND REMOVAL SUTURE GRANULOMA, REMOVAL OF FOREIGN BODY OF ABDOMEN (N/A) drainage of seroma, debridement sharp and blunt down to and including the fascia  Specimen(s):  ID Type Source Tests Collected by Time Destination   1 : ABDOMINAL PRODUCTS OF DEBRIDEMENT AND SUTURE GRANULOMA  Tissue Abdominal TISSUE EXAM Megan Turpin MD 10/6/2022  2:19 PM        Estimated Blood Loss:   Minimal    Drains:  Open Drain Anterior;Midline Abdomen (Active)   Number of days: 0       Anesthesia Type:   IV Sedation with Anesthesia    Operative Indications:  Suture granuloma [T81 89XA]  Acute drainage from abdominal wall  Operative Findings:  Necrotic tissue, serous fluid and suture granulomas in the lower portion of the abdominal wall  In the upper wound some necrotic tissue was identified but no obvious suture granulomas  Complications:   None    Procedure and Technique:  Patient brought the operating room postop table supine position  Under adequate IV sedation the abdomen was prepped and draped in usual sterile fashion  Starting with the lower wound there was a small opening, a pinhole, that was draining turbid fluid  It was not purulent 1% lidocaine was used to infiltrate this area  A 15 scalpel blade was used make an incision starting at the pinhole and extending upwards  This carried down subcutaneous tissue using the Bovie  There was some necrotic fat identified  Dissection carried further and moving slightly toward the left and then cavity was entered  The fluid was suction from the field  A suture granuloma was identified was removed    No obvious purulent drainage or succus entericus was present  Any loculations were broken up with the gloved finger  This was down to the fascia  Sharp and blunt debridement was performed utilizing the scalpel and also the Bovie  After draining the entire area a sponge was placed in the wound and attention was placed to the upper site  This was infiltrated with 1% lidocaine  An up and down incision was made in this area also using the 15 scalpel blade  Scar tissue was encountered along with some not to healthy-appearing tissue  The area was explored for suture granulomas, pieces of mesh, etcetera etcetera  Nothing was actually identified  The tissue was debrided  The the right rib was palpated  Debridement was carried down to the fascia using sharp and blunt dissection  Continued exploration was performed but no obvious abnormalities, pieces of mesh, tacks or any other foreign bodies were encountered  Sponge was placed in this wound and attention was placed to the lower wound  This was inspected for bleeding and no bleeding was noted  The area was continued to be debrided sharply and bluntly and then this was completed  The wound was irrigated with saline solution this was suction from the field  Bleeding was controlled electrocautery  A Penrose drain was obtained and placed in the wound  The wound was closed in layers using 3-0 Vicryl in a subcutaneous tissue  The skin was closed with 3-0 nylon vertical mattress fashion  The  drain was sewn in with 3-0 nylon  Dry sterile dressings applied  Attention was placed to the superior wound  This was also closed in layers using 2 0 PDS for the deep wound  3-0 Vicryl was used to close the subcutaneous tissue and dermis  Skin was closed with 3-0 nylon vertical mattress fashion  Xeroform was placed in the wound and a dry sterile dressing  Prior to closing these wounds 0 5% Marcaine was infiltrated liberally into both of these wounds    The estimated blood was minimal patient tolerated procedure well delivered the room stable condition  Thanks     I was present for the entire procedure    Patient Disposition:  PACU         SIGNATURE: Finn Ac MD  DATE: October 6, 2022  TIME: 4:46 PM

## 2022-10-12 ENCOUNTER — OFFICE VISIT (OUTPATIENT)
Dept: SURGERY | Facility: CLINIC | Age: 57
End: 2022-10-12

## 2022-10-12 VITALS
HEIGHT: 67 IN | WEIGHT: 293 LBS | HEART RATE: 88 BPM | TEMPERATURE: 99.4 F | OXYGEN SATURATION: 97 % | BODY MASS INDEX: 45.99 KG/M2

## 2022-10-12 DIAGNOSIS — S31.109D OPEN WOUND OF ABDOMINAL WALL, SUBSEQUENT ENCOUNTER: Primary | ICD-10-CM

## 2022-10-12 DIAGNOSIS — T81.89XD SUTURE GRANULOMA, SUBSEQUENT ENCOUNTER: ICD-10-CM

## 2022-10-12 PROCEDURE — 99024 POSTOP FOLLOW-UP VISIT: CPT | Performed by: SPECIALIST

## 2022-10-12 RX ORDER — OXYCODONE HYDROCHLORIDE AND ACETAMINOPHEN 5; 325 MG/1; MG/1
1 TABLET ORAL EVERY 6 HOURS PRN
Qty: 20 TABLET | Refills: 0 | Status: SHIPPED | OUTPATIENT
Start: 2022-10-12

## 2022-10-12 RX ORDER — LEVOFLOXACIN 750 MG/1
750 TABLET ORAL EVERY 24 HOURS
Qty: 10 TABLET | Refills: 1 | Status: SHIPPED | OUTPATIENT
Start: 2022-10-12 | End: 2022-10-22

## 2022-10-12 NOTE — PROGRESS NOTES
Gavinelizabethgris Pepper presents today for follow-up visit status post wound exploration in 2 separate areas  Left side her abdominal wall began draining clearish fluid this was explored  A significant amount of serosanguineous fluid was encountered in the operating room  A fairly large cavity was encountered  This was probed with the gloved finger breaking up any loculations that may have been in there  No pus  No succus entericus  A Penrose drain was placed at that time  Etiology? This area is draining serosanguineous fluid but no succus entericus or evidence of enteric contents  This is great  Right upper area at the superior aspect of the previous midline incision was explored  Not much was found in that area  Previously old mesh was removed and a metallic tack  This time nothing was really encountered except some unhealthy appearing tissue  This was debrided the area was attempted to be closed  It was closed in layers using subcutaneous and dermal stitches along with skin stitches  This area is draining serous fluid  No pus  The skin is red due to the stitches and the tension on the stitches  Impression:  As above  Plan: We will leave the drain in and also the stitches  One additional week  Empiric antibiotics and also some more analgesia is ordered for her  Call if there is any changes or problems

## 2022-10-17 PROCEDURE — 88304 TISSUE EXAM BY PATHOLOGIST: CPT | Performed by: PATHOLOGY

## 2022-10-18 ENCOUNTER — OFFICE VISIT (OUTPATIENT)
Dept: SURGERY | Facility: CLINIC | Age: 57
End: 2022-10-18

## 2022-10-18 VITALS
WEIGHT: 293 LBS | HEART RATE: 72 BPM | TEMPERATURE: 98 F | OXYGEN SATURATION: 97 % | BODY MASS INDEX: 45.99 KG/M2 | HEIGHT: 67 IN

## 2022-10-18 DIAGNOSIS — T81.89XD SUTURE GRANULOMA, SUBSEQUENT ENCOUNTER: Primary | ICD-10-CM

## 2022-10-18 PROCEDURE — 99024 POSTOP FOLLOW-UP VISIT: CPT | Performed by: SPECIALIST

## 2022-10-18 RX ORDER — OXYCODONE HYDROCHLORIDE AND ACETAMINOPHEN 5; 325 MG/1; MG/1
1 TABLET ORAL EVERY 4 HOURS PRN
Qty: 20 TABLET | Refills: 0 | Status: SHIPPED | OUTPATIENT
Start: 2022-10-18

## 2022-10-18 RX ORDER — TETRACYCLINE HYDROCHLORIDE 500 MG/1
500 CAPSULE ORAL 2 TIMES DAILY
Qty: 20 CAPSULE | Refills: 0 | Status: SHIPPED | OUTPATIENT
Start: 2022-10-18 | End: 2022-10-19 | Stop reason: SDUPTHER

## 2022-10-18 NOTE — PROGRESS NOTES
Urszula Galo presents today for follow-up visit status post wound exploration in 2 separate areas of the abdominal wall  Left side of her abdominal wall began draining clearish fluid was explored  A significant amount of serosanguineous fluid was encountered about 4 centimeters sq  It was debrided  A drain was placed  She was seen last week and the drain was continued  She also had an upper abdominal wound that was explored and debrided  This was closed with sutures  Today she says she has pain in the left side with a drain is  Started hurting her last night actually woke her from sleep  The drainage is clear yellowish and somewhat mucousy  No real pus was identified  No succus entericus  The drain was removed  The wound is probed with Q-tips and peroxide  A dry sterile dressing is placed  The upper abdominal wound appears much better than it did last week  Half the sutures removed and was redressed  She says that she is having pain in her joints that she relates to the [address]  Impression:  Improving  Plan:  Changed antibiotics to tetracycline  Percocet for pain  Redressed the wound p r n     Bring her back 1 week for re-evaluation removal of the remaining sutures

## 2022-10-19 DIAGNOSIS — T81.89XD SUTURE GRANULOMA, SUBSEQUENT ENCOUNTER: Primary | ICD-10-CM

## 2022-10-19 RX ORDER — TETRACYCLINE HYDROCHLORIDE 500 MG/1
500 CAPSULE ORAL 2 TIMES DAILY
Qty: 20 CAPSULE | Refills: 0 | Status: SHIPPED | OUTPATIENT
Start: 2022-10-19 | End: 2022-10-29

## 2022-10-26 ENCOUNTER — OFFICE VISIT (OUTPATIENT)
Dept: SURGERY | Facility: CLINIC | Age: 57
End: 2022-10-26

## 2022-10-26 VITALS
BODY MASS INDEX: 45.99 KG/M2 | HEIGHT: 67 IN | WEIGHT: 293 LBS | OXYGEN SATURATION: 97 % | HEART RATE: 82 BPM | TEMPERATURE: 97 F

## 2022-10-26 DIAGNOSIS — S31.109D OPEN WOUND OF ABDOMINAL WALL, SUBSEQUENT ENCOUNTER: ICD-10-CM

## 2022-10-26 DIAGNOSIS — T81.89XD SUTURE GRANULOMA, SUBSEQUENT ENCOUNTER: Primary | ICD-10-CM

## 2022-10-26 PROCEDURE — 99024 POSTOP FOLLOW-UP VISIT: CPT | Performed by: SPECIALIST

## 2022-10-26 NOTE — PROGRESS NOTES
Kathryn Guzman presents today for her 2nd postoperative visit status post wound exploration in 2 separate areas of the abdominal wall  When last seen her Penrose drain was removed from the left-sided abdominal wound  The upper midline wound was healing with sutures in place  Today in the office she said she had a low-grade fever this morning which upset her  She does not feel any worse than she normally does  Physical exam:  Middle-aged obese white female awake alert no distress    Abdomen multiple scars are noted  Small open wound in left upper quadrant probed with a Q-tip in serous fluid is encountered  It drains the area  No pus  No succus entericus  Clean with Q-tip peroxide  Dry sterile dressing  Upper midline incision with sutures in place  These removed  No evidence of infection  Dry sterile dressings applied  Impression:  Progressing after suture granuloma removal and drainage of fluid collection  Plan:  Continue same  Return to the office in 10 days  Call if any problems  Kathryn Guzman is a great patient and we love her

## 2022-11-08 ENCOUNTER — OFFICE VISIT (OUTPATIENT)
Dept: SURGERY | Facility: CLINIC | Age: 57
End: 2022-11-08

## 2022-11-08 VITALS
WEIGHT: 293 LBS | HEIGHT: 67 IN | HEART RATE: 79 BPM | OXYGEN SATURATION: 98 % | BODY MASS INDEX: 45.99 KG/M2 | TEMPERATURE: 98 F

## 2022-11-08 DIAGNOSIS — R10.84 ABDOMINAL PAIN, GENERALIZED: Primary | ICD-10-CM

## 2022-11-08 RX ORDER — OXYCODONE HYDROCHLORIDE AND ACETAMINOPHEN 5; 325 MG/1; MG/1
1 TABLET ORAL EVERY 6 HOURS PRN
Qty: 20 TABLET | Refills: 0 | Status: SHIPPED | OUTPATIENT
Start: 2022-11-08

## 2022-11-08 NOTE — PROGRESS NOTES
Jeff Flores presents today for her follow-up visit status post wound exploration in 2 separate areas to the abdominal wall  She developed spontaneous tender mass in left upper quadrant that was drained in the operating room  Serous fluid was noted  No blood, pus, or succus entericus  She also had an upper midline incision that has been a chronic recurrent problem  Today in the office she complains of fairly significant discomfort in the left upper quadrant above the drainage site  She is quite upset about this and is teary-eyed  The wound is probed and the fluid appears to be gone  No pus or blood noted  She is quite tender in the left upper quadrant  There is no erythema of the skin  Impression:  Spontaneous fluid collection in a patient who has a significant amount of mesh in her abdominal wall  Must rule out enterocutaneous fistula as etiology of the fluid and pain  Although there is no evidence of succus entericus from the wound itself  Plan:  CT scan abdomen and pelvis with p o  And IV contrast to evaluate the abdominal wall  And EC fistula would be a huge problem in this patient who was quite obese and has a significant amount of mesh in her abdominal wall  CT scan ASAP to help define the problem

## 2022-11-20 ENCOUNTER — HOSPITAL ENCOUNTER (OUTPATIENT)
Dept: CT IMAGING | Facility: HOSPITAL | Age: 57
Discharge: HOME | End: 2022-11-20
Attending: SPECIALIST

## 2022-11-20 DIAGNOSIS — R10.84 ABDOMINAL PAIN, GENERALIZED: ICD-10-CM

## 2022-11-20 RX ADMIN — IOHEXOL 100 ML: 350 INJECTION, SOLUTION INTRAVENOUS at 12:35

## 2022-11-23 ENCOUNTER — TELEPHONE (OUTPATIENT)
Dept: UROLOGY | Facility: MEDICAL CENTER | Age: 57
End: 2022-11-23

## 2022-11-23 ENCOUNTER — APPOINTMENT (OUTPATIENT)
Dept: LAB | Facility: CLINIC | Age: 57
End: 2022-11-23

## 2022-11-23 ENCOUNTER — OFFICE VISIT (OUTPATIENT)
Dept: SURGERY | Facility: CLINIC | Age: 57
End: 2022-11-23

## 2022-11-23 VITALS
TEMPERATURE: 97.2 F | WEIGHT: 293 LBS | HEIGHT: 67 IN | OXYGEN SATURATION: 98 % | HEART RATE: 75 BPM | BODY MASS INDEX: 45.99 KG/M2

## 2022-11-23 DIAGNOSIS — R10.9 RIGHT FLANK PAIN: Primary | ICD-10-CM

## 2022-11-23 DIAGNOSIS — R31.9 HEMATURIA: ICD-10-CM

## 2022-11-23 DIAGNOSIS — R10.9 FLANK PAIN: ICD-10-CM

## 2022-11-23 DIAGNOSIS — R31.9 HEMATURIA, UNSPECIFIED TYPE: ICD-10-CM

## 2022-11-23 DIAGNOSIS — R31.9 HEMATURIA, UNSPECIFIED TYPE: Primary | ICD-10-CM

## 2022-11-23 LAB
BACTERIA UR QL AUTO: ABNORMAL /HPF
BILIRUB UR QL STRIP: NEGATIVE
CLARITY UR: CLEAR
COLOR UR: ABNORMAL
GLUCOSE UR STRIP-MCNC: NEGATIVE MG/DL
HGB UR QL STRIP.AUTO: NEGATIVE
KETONES UR STRIP-MCNC: NEGATIVE MG/DL
LEUKOCYTE ESTERASE UR QL STRIP: NEGATIVE
NITRITE UR QL STRIP: NEGATIVE
NON-SQ EPI CELLS URNS QL MICRO: ABNORMAL /HPF
PH UR STRIP.AUTO: 6 [PH]
PROT UR STRIP-MCNC: ABNORMAL MG/DL
RBC #/AREA URNS AUTO: ABNORMAL /HPF
SP GR UR STRIP.AUTO: 1.02 (ref 1–1.03)
UROBILINOGEN UR STRIP-ACNC: <2 MG/DL
WBC #/AREA URNS AUTO: ABNORMAL /HPF

## 2022-11-23 NOTE — TELEPHONE ENCOUNTER
Call received directly from Dr Dierdre Hansen about this patient  He received referral from Dr Georgina Cardenas in regards to patient and symptoms she is having  He asked if we can contact patient and schedule an appointment with him next week to discuss her symptoms  Also, he would like patient to urine testing completed prior to her office visit  Call placed to patient and spoke with her  She is scheduled with Dr Deirdre Hansen on 11- at 1:15pm in the [facility] office  Pt is aware of urine testing and will have this completed prior to [id number] [facility] appointment

## 2022-11-23 NOTE — PROGRESS NOTES
Isaac Escoto presents today for follow-up visit in regards to abdominal pain  She was seen a few weeks ago and has significant left-sided abdominal pain  She had undergone operative drainage of a fluid collection of the abdominal wall that appeared to be serous in nature  Due to the significant pain she had a CT scan of the abdomen pelvis was obtained  The possibility of enterocutaneous fistula was entertained although clinically this was not the case  The CT scan did not demonstrate any enterocutaneous fistula  It did however demonstrate sub cm renal hypodensities that were too small to characterize  She developed right flank pain and also hematuria  The pain was significant  She presents today for follow-up visit  She denies any persistent left-sided abdominal pain  She does have right flank pain to percussion  She has never seen a urologist before  Impression:  Renal issue rule out renal or ureteral calculi  Plan:  She needs to see a urologist   I notified urology in regards to this  I gave her name and birth date to them  She will call to make an appointment ASAP for evaluation of this problem  She is a friend that we have been taking care of for many years  Will try to expedite her evaluation by Urology  If there is anything else we can do for of course will was available

## 2022-11-25 LAB — BACTERIA UR CULT: NORMAL

## 2022-11-29 ENCOUNTER — OFFICE VISIT (OUTPATIENT)
Dept: UROLOGY | Facility: MEDICAL CENTER | Age: 57
End: 2022-11-29

## 2022-11-29 VITALS
OXYGEN SATURATION: 98 % | HEIGHT: 67 IN | BODY MASS INDEX: 45.99 KG/M2 | WEIGHT: 293 LBS | SYSTOLIC BLOOD PRESSURE: 142 MMHG | DIASTOLIC BLOOD PRESSURE: 86 MMHG | HEART RATE: 66 BPM

## 2022-11-29 DIAGNOSIS — R31.9 HEMATURIA: ICD-10-CM

## 2022-11-29 DIAGNOSIS — E66.01 MORBID (SEVERE) OBESITY DUE TO EXCESS CALORIES (HCC): ICD-10-CM

## 2022-11-29 DIAGNOSIS — R10.9 RIGHT FLANK PAIN: Primary | ICD-10-CM

## 2022-11-29 NOTE — PROGRESS NOTES
Assessment/Plan:    Right flank pain  CT with contrast is reviewed  There is no definite stone or obstruction  I did recommend obtaining a noncontrast study so that stones can be further excluded  Potential etiologies to include musculoskeletal pain as her discomfort is worse with movement  She will return after the CT scan for follow-up  ER precautions were discussed  Hematuria  Microscopic hematuria is noted  Urine culture is negative  No mass or definite cause is noted on contrast CT  We will obtain a noncontrast study and then decide about cystoscopy  Diagnoses and all orders for this visit:    Right flank pain  -     Ambulatory Referral to Urology  -     CT abdomen pelvis wo contrast; Future    Hematuria  -     Ambulatory Referral to Urology  -     CT abdomen pelvis wo contrast; Future    Morbid (severe) obesity due to excess calories ([facility] [address] )          Subjective:      Patient ID: Ashleigh Wagoner is a 62 y o  female  CC: Right flank pain    Pain for the last month  Stabbing pain, severe, no radiation  NO fever  She had an episode of gross hematuria 2 weeks ago  No dysuria but she is going frequency and with urgency  Pain worse with moving, twisting and wakes her up at night at times  The following portions of the patient's history were reviewed and updated as appropriate: allergies, current medications, past family history, past medical history, past social history, past surgical history and problem list     Review of Systems   Constitutional: Positive for appetite change  Negative for activity change, fatigue and fever  HENT: Negative  Eyes: Negative  Respiratory: Negative  Cardiovascular: Negative  Gastrointestinal: Positive for nausea  Negative for blood in stool, constipation, diarrhea and vomiting  Endocrine: Negative  Genitourinary: Positive for flank pain, frequency and hematuria  See HPI   Skin: Negative  Allergic/Immunologic: Negative  Neurological: Negative  Hematological: Negative  Psychiatric/Behavioral: Negative  Objective:      /86   Pulse 66   Ht 5' 7" (1 702 m)   Wt 134 kg (296 lb)   LMP 06/25/2020   SpO2 98%   BMI 46 36 kg/m²          Physical Exam  Constitutional:       General: She is not in acute distress  Appearance: Normal appearance  She is well-developed and well-nourished  She is obese  She is not ill-appearing, toxic-appearing or diaphoretic  HENT:      Head: Normocephalic and atraumatic  Eyes:      Conjunctiva/sclera: Conjunctivae normal    Cardiovascular:      Rate and Rhythm: Normal rate  Pulmonary:      Effort: Pulmonary effort is normal    Abdominal:      General: There is no distension  Palpations: Abdomen is soft  There is no mass  Tenderness: There is no abdominal tenderness  There is right CVA tenderness  There is no CVA tenderness, left CVA tenderness, guarding or rebound  Comments: Superficial tenderness right flank and ribs   Musculoskeletal:         General: No edema  Cervical back: Neck supple  Skin:     General: Skin is warm and dry  Findings: Rash (psoriasis) present  Neurological:      General: No focal deficit present  Mental Status: She is alert and oriented to person, place, and time  Psychiatric:         Mood and Affect: Mood and affect and mood normal          Behavior: Behavior normal          Thought Content:  Thought content normal          Judgment: Judgment normal

## 2022-11-29 NOTE — ASSESSMENT & PLAN NOTE
CT with contrast is reviewed  There is no definite stone or obstruction  I did recommend obtaining a noncontrast study so that stones can be further excluded  Potential etiologies to include musculoskeletal pain as her discomfort is worse with movement  She will return after the CT scan for follow-up  ER precautions were discussed

## 2022-11-29 NOTE — ASSESSMENT & PLAN NOTE
Microscopic hematuria is noted  Urine culture is negative  No mass or definite cause is noted on contrast CT  We will obtain a noncontrast study and then decide about cystoscopy

## 2022-11-30 ENCOUNTER — TELEPHONE (OUTPATIENT)
Dept: UROLOGY | Facility: AMBULATORY SURGERY CENTER | Age: 57
End: 2022-11-30

## 2022-11-30 ENCOUNTER — OFFICE VISIT (OUTPATIENT)
Dept: SURGERY | Facility: CLINIC | Age: 57
End: 2022-11-30

## 2022-11-30 VITALS
HEART RATE: 104 BPM | TEMPERATURE: 97.5 F | OXYGEN SATURATION: 97 % | HEIGHT: 67 IN | WEIGHT: 293 LBS | BODY MASS INDEX: 45.99 KG/M2

## 2022-11-30 DIAGNOSIS — R10.9 RIGHT FLANK PAIN: Primary | ICD-10-CM

## 2022-11-30 RX ORDER — OXYCODONE HYDROCHLORIDE AND ACETAMINOPHEN 5; 325 MG/1; MG/1
1 TABLET ORAL EVERY 4 HOURS PRN
Qty: 20 TABLET | Refills: 0 | Status: SHIPPED | OUTPATIENT
Start: 2022-11-30

## 2022-11-30 NOTE — TELEPHONE ENCOUNTER
Pt called and stated that she has her CT scan on 12/06/22    She said when she was in to see Dr Jiménez that he wanted to see her after the CT scan was done and that there were no appointments  She stated that the office was looking for a date and time  She doesn't want to wait for 2 months because of the pain she is in       Please review and advice     Pt can be reached at [phone]

## 2022-11-30 NOTE — PROGRESS NOTES
[address] presents today for an unscheduled visit in regards to some drainage from a recent abdominal wound  She had developed an abdominal wall fluid collection that was drained  Clear serosanguineous fluid was aspirated  No pus  A Penrose drain was placed at that time subsequent removed 2 weeks postop  The drainage had dried up when she was seen last week  Her main complaint at that time was right flank pain  She had some CT scan evidence of some abnormalities of her kidney and she also had hematuria  She was referred to Urology for their consideration of this problem  The drainage started this morning but was minimal and eventually stop  Physical exam:  Middle-aged obese white female awake alert no distress    Abdomen multiple scars are noted  The drainage site is inspected noted be dry  No further drainage was noted as it was manipulated  Impression:  Minimal residual drainage at the previous drain site  Plan:  She has fairly severe right flank pain  Some analgesics were prescribed  She is due to undergo a CT scan without contrast to evaluate the kidney for calculous disease  She will be seen here on a p r n  Basis but of course she can call any time and we would see her      She brought some [place] presents which was quite nice of her

## 2022-12-09 ENCOUNTER — HOSPITAL ENCOUNTER (OUTPATIENT)
Dept: CT IMAGING | Facility: HOSPITAL | Age: 57
End: 2022-12-09
Attending: UROLOGY

## 2022-12-09 DIAGNOSIS — R10.9 RIGHT FLANK PAIN: ICD-10-CM

## 2022-12-09 DIAGNOSIS — R31.9 HEMATURIA: ICD-10-CM

## 2022-12-12 ENCOUNTER — OFFICE VISIT (OUTPATIENT)
Dept: URGENT CARE | Facility: CLINIC | Age: 57
End: 2022-12-12

## 2022-12-12 VITALS
HEART RATE: 103 BPM | RESPIRATION RATE: 24 BRPM | SYSTOLIC BLOOD PRESSURE: 158 MMHG | TEMPERATURE: 98.2 F | OXYGEN SATURATION: 99 % | DIASTOLIC BLOOD PRESSURE: 88 MMHG

## 2022-12-12 DIAGNOSIS — R06.02 SHORTNESS OF BREATH: Primary | ICD-10-CM

## 2022-12-12 DIAGNOSIS — R50.9 FEVER, UNSPECIFIED FEVER CAUSE: ICD-10-CM

## 2022-12-12 DIAGNOSIS — R68.89 FLU-LIKE SYMPTOMS: ICD-10-CM

## 2022-12-12 RX ORDER — ALBUTEROL SULFATE 90 UG/1
2 AEROSOL, METERED RESPIRATORY (INHALATION) EVERY 6 HOURS PRN
Qty: 6.7 G | Refills: 0 | Status: SHIPPED | OUTPATIENT
Start: 2022-12-12

## 2022-12-12 RX ORDER — ALBUTEROL SULFATE 2.5 MG/3ML
2.5 SOLUTION RESPIRATORY (INHALATION) ONCE
Status: COMPLETED | OUTPATIENT
Start: 2022-12-12 | End: 2022-12-12

## 2022-12-12 RX ORDER — OSELTAMIVIR PHOSPHATE 75 MG/1
75 CAPSULE ORAL 2 TIMES DAILY
Qty: 10 CAPSULE | Refills: 0 | Status: SHIPPED | OUTPATIENT
Start: 2022-12-12 | End: 2022-12-17

## 2022-12-12 RX ORDER — BUDESONIDE AND FORMOTEROL FUMARATE DIHYDRATE 80; 4.5 UG/1; UG/1
2 AEROSOL RESPIRATORY (INHALATION) 2 TIMES DAILY
Qty: 10.2 G | Refills: 0 | Status: SHIPPED | OUTPATIENT
Start: 2022-12-12

## 2022-12-12 RX ADMIN — ALBUTEROL SULFATE 2.5 MG: 2.5 SOLUTION RESPIRATORY (INHALATION) at 14:36

## 2022-12-12 NOTE — PATIENT INSTRUCTIONS
Influenza   AMBULATORY CARE:   Influenza  (the flu) is an infection caused by the influenza virus  The flu is easily spread when an infected person coughs, sneezes, or has close contact with others  You may be able to spread the flu to others for 1 week or longer after signs or symptoms appear  Common signs and symptoms include the following:   Fever and chills    Headaches, body aches, and muscle or joint pain    Cough, runny nose, and sore throat    Loss of appetite, nausea, vomiting, or diarrhea    Tiredness    Trouble breathing    Call your local emergency number (911 in the [address] [facility]) if:   You have trouble breathing, and your lips look purple or blue  You have a seizure  Seek care immediately if:   You are dizzy, or you are urinating less or not at all  You have a headache with a stiff neck, and you feel tired or confused  You have new pain or pressure in your chest     Your symptoms, such as shortness of breath, vomiting, or diarrhea, get worse  Your symptoms, such as fever and coughing, seem to get better, but then get worse  Call your doctor if:   You have new muscle pain or weakness  You have questions or concerns about your condition or care  Treatment for influenza  may include any of the following:  Acetaminophen  decreases pain and fever  It is available without a doctor's order  Ask how much to take and how often to take it  Follow directions  Read the labels of all other medicines you are using to see if they also contain acetaminophen, or ask your doctor or pharmacist  Acetaminophen can cause liver damage if not taken correctly  Do not use more than 4 grams (4,000 milligrams) total of acetaminophen in one day  NSAIDs , such as ibuprofen, help decrease swelling, pain, and fever  This medicine is available with or without a doctor's order  NSAIDs can cause stomach bleeding or kidney problems in certain people   If you take blood thinner medicine, always ask your healthcare provider if NSAIDs are safe for you  Always read the medicine label and follow directions  Antivirals  help fight a viral infection  Manage your symptoms:   Rest  as much as you can to help you recover  Drink liquids as directed  to help prevent dehydration  Ask how much liquid to drink each day and which liquids are best for you  Prevent the spread of germs:       Wash your hands often  Wash your hands several times each day  Wash after you use the bathroom, change a child's diaper, and before you prepare or eat food  Use soap and water every time  Rub your soapy hands together, lacing your fingers  Wash the front and back of your hands, and in between your fingers  Use the fingers of one hand to scrub under the fingernails of the other hand  Wash for at least 20 seconds  Rinse with warm, running water for several seconds  Then dry your hands with a clean towel or paper towel  Use hand  that contains alcohol if soap and water are not available  Do not touch your eyes, nose, or mouth without washing your hands first          Cover a sneeze or cough  Use a tissue that covers your mouth and nose  Throw the tissue away in a trash can right away  Use the bend of your arm if a tissue is not available  Wash your hands well with soap and water or use a hand   Stay away from others while you are sick  Avoid crowds as much as possible  Ask about vaccines you may need  Talk to your healthcare provider about your vaccine history  He or she will tell you which vaccines you need, and when to get them  Get the influenza (flu) vaccine as soon as it is available  Flu viruses change, so it is important to get a flu vaccine every year  Get the pneumonia vaccine if recommended  This vaccine is usually recommended every 5 years  Your provider will tell you when to get this vaccine, if needed      Follow up with your doctor as directed:  Write down your questions so you remember to ask them during your visits  © Copyright [employer] 2022 Information is for End User's use only and may not be sold, redistributed or otherwise used for commercial purposes  All illustrations and images included in CareNotes® are the copyrighted property of A D A M , Inc  or Jd Barron  The above information is an  only  It is not intended as medical advice for individual conditions or treatments  Talk to your doctor, nurse or pharmacist before following any medical regimen to see if it is safe and effective for you

## 2022-12-13 LAB
FLUAV RNA RESP QL NAA+PROBE: NEGATIVE
FLUBV RNA RESP QL NAA+PROBE: NEGATIVE
SARS-COV-2 RNA RESP QL NAA+PROBE: NEGATIVE

## 2022-12-13 NOTE — PROGRESS NOTES
[facility]        NAME: Sravani Padilla is a 62 y o  female  : 1965    MRN: [id number]  DATE: 2022  TIME: 7:53 PM    Assessment and Plan   Shortness of breath [R06 02]  1  Shortness of breath  budesonide-formoterol (SYMBICORT) 80-4 5 MCG/ACT inhaler    albuterol (PROVENTIL HFA,VENTOLIN HFA) 90 mcg/act inhaler    albuterol inhalation solution 2 5 mg    Covid/Flu-Office Collect    oseltamivir (TAMIFLU) 75 mg capsule      2  Fever, unspecified fever cause  Covid/Flu-Office Collect    oseltamivir (TAMIFLU) 75 mg capsule      3  Flu-like symptoms  oseltamivir (TAMIFLU) 75 mg capsule          Albuterol neb given in office - pt reports improved symptoms after neb   Covid/flu collected  Start tamiflu   symbicort and albuterol refilled   F/u with pcp   Pt in agreement with plan of care  Patient Instructions     Follow up with PCP in 3-5 days  Proceed to  ER if symptoms worsen  Chief Complaint     Chief Complaint   Patient presents with   • Cold Like Symptoms     Patient with cough, wheezing, SOB  States she is out of symbicort and pcp wont refil  History of Present Illness   Sravani Padilla presents to the clinic c/o    Cold Like Symptoms (Patient with cough, wheezing, SOB  States she is out of symbicort and pcp wont refil  )      Review of Systems   Review of Systems   All other systems reviewed and are negative          Current Medications     Long-Term Medications   Medication Sig Dispense Refill   • budesonide-formoterol (SYMBICORT) 80-4 5 MCG/ACT inhaler Inhale 2 puffs 2 (two) times a day 10 2 g 0   • Cholecalciferol 50 MCG ( UT) CAPS Take 1 capsule by mouth 5000 units daily     • omeprazole (PriLOSEC) 20 mg delayed release capsule Take 20 mg by mouth every morning      • Iodoquinol-Hydrocortisone-Aloe 1-1 9 % CREA Apply topically 4 (four) times a day (Patient not taking: Reported on 2022) 40 g 1   • Ixekizumab (Taltz) 80 MG/ML SOAJ Inject 80 mg under the skin every 14 (fourteen) days (Patient not taking: Reported on 10/4/2022)     • sertraline (ZOLOFT) 50 mg tablet Take 50 mg by mouth daily (Patient not taking: Reported on 9/14/2022)     • [DISCONTINUED] budesonide-formoterol (SYMBICORT) 80-4 5 MCG/ACT inhaler Inhale 2 puffs 2 (two) times a day (Patient not taking: Reported on 12/12/2022)         Current Allergies     Allergies as of 12/12/2022 - Reviewed 12/12/2022   Allergen Reaction Noted   • Penicillins Other (See Comments) and Anaphylaxis 09/27/2007   • Morphine Itching 09/27/2007   • Prednisone Abdominal Pain 10/02/2020            The following portions of the patient's history were reviewed and updated as appropriate: allergies, current medications, past family history, past medical history, past social history, past surgical history and problem list     Objective   /88   Pulse 103   Temp 98 2 °F (36 8 °C) (Tympanic)   Resp (!) 24   LMP 06/25/2020   SpO2 99%        Physical Exam     Physical Exam  Vitals and nursing note reviewed  Constitutional:       Appearance: Normal appearance  She is well-developed  HENT:      Head: Normocephalic and atraumatic  Right Ear: Tympanic membrane, ear canal and external ear normal       Left Ear: Tympanic membrane, ear canal and external ear normal       Nose: Congestion present  Mouth/Throat:      Mouth: Mucous membranes are moist    Eyes:      General: Lids are normal       Conjunctiva/sclera: Conjunctivae normal       Pupils: Pupils are equal, round, and reactive to light  Cardiovascular:      Rate and Rhythm: Normal rate and regular rhythm  Pulses: Normal pulses  Heart sounds: Normal heart sounds, S1 normal and S2 normal    Pulmonary:      Effort: Pulmonary effort is normal       Breath sounds: Examination of the right-upper field reveals wheezing  Examination of the left-upper field reveals wheezing  Examination of the right-middle field reveals wheezing   Examination of the left-middle field reveals wheezing  Wheezing present  Musculoskeletal:      Cervical back: Normal range of motion and neck supple  Skin:     General: Skin is warm and dry  Neurological:      Mental Status: She is alert and oriented to person, place, and time  Psychiatric:         Speech: Speech normal          Behavior: Behavior normal  Behavior is cooperative  Thought Content:  Thought content normal          Judgment: Judgment normal

## 2022-12-16 ENCOUNTER — TELEPHONE (OUTPATIENT)
Dept: UROLOGY | Facility: MEDICAL CENTER | Age: 57
End: 2022-12-16

## 2022-12-16 NOTE — RESULT ENCOUNTER NOTE
Inform pt of abnormal test result  There are 2 tiny nonobstructing stones in the lower part of the right kidney  These would be an unlikely cause for pain  She can keep her follow-up appointment

## 2022-12-16 NOTE — TELEPHONE ENCOUNTER
----- Message from Charla Campos MD sent at 12/16/2022 11:10 AM EST -----  Inform pt of abnormal test result  There are 2 tiny nonobstructing stones in the lower part of the right kidney  These would be an unlikely cause for pain  She can keep her follow-up appointment

## 2023-03-01 ENCOUNTER — OFFICE VISIT (OUTPATIENT)
Dept: SURGERY | Facility: CLINIC | Age: 58
End: 2023-03-01

## 2023-03-01 VITALS
HEART RATE: 69 BPM | HEIGHT: 67 IN | BODY MASS INDEX: 45.99 KG/M2 | WEIGHT: 293 LBS | TEMPERATURE: 97.8 F | OXYGEN SATURATION: 98 %

## 2023-03-01 DIAGNOSIS — E66.01 MORBID (SEVERE) OBESITY DUE TO EXCESS CALORIES (HCC): ICD-10-CM

## 2023-03-01 DIAGNOSIS — T81.89XD SUTURE GRANULOMA, SUBSEQUENT ENCOUNTER: Primary | ICD-10-CM

## 2023-03-01 RX ORDER — OXYCODONE HYDROCHLORIDE AND ACETAMINOPHEN 5; 325 MG/1; MG/1
1 TABLET ORAL EVERY 4 HOURS PRN
Qty: 20 TABLET | Refills: 0 | Status: SHIPPED | OUTPATIENT
Start: 2023-03-01

## 2023-03-01 RX ORDER — LEVOFLOXACIN 500 MG/1
500 TABLET, FILM COATED ORAL EVERY 24 HOURS
Qty: 14 TABLET | Refills: 1 | Status: SHIPPED | OUTPATIENT
Start: 2023-03-01 | End: 2023-03-15

## 2023-03-15 ENCOUNTER — OFFICE VISIT (OUTPATIENT)
Dept: SURGERY | Facility: CLINIC | Age: 58
End: 2023-03-15

## 2023-03-15 VITALS
OXYGEN SATURATION: 97 % | HEIGHT: 67 IN | TEMPERATURE: 99 F | WEIGHT: 293 LBS | BODY MASS INDEX: 45.99 KG/M2 | HEART RATE: 73 BPM

## 2023-03-15 DIAGNOSIS — R10.9 RIGHT FLANK PAIN: ICD-10-CM

## 2023-03-15 DIAGNOSIS — T81.89XD SUTURE GRANULOMA, SUBSEQUENT ENCOUNTER: Primary | ICD-10-CM

## 2023-03-15 DIAGNOSIS — F41.9 ANXIETY: ICD-10-CM

## 2023-03-15 DIAGNOSIS — R06.02 SHORTNESS OF BREATH: ICD-10-CM

## 2023-03-15 RX ORDER — LEVOFLOXACIN 500 MG/1
500 TABLET, FILM COATED ORAL EVERY 24 HOURS
Qty: 14 TABLET | Refills: 10 | Status: SHIPPED | OUTPATIENT
Start: 2023-03-15 | End: 2023-03-29

## 2023-03-15 RX ORDER — OXYCODONE HYDROCHLORIDE AND ACETAMINOPHEN 5; 325 MG/1; MG/1
1 TABLET ORAL EVERY 4 HOURS PRN
Qty: 20 TABLET | Refills: 0 | Status: SHIPPED | OUTPATIENT
Start: 2023-03-15

## 2023-03-15 RX ORDER — HYDROXYZINE HYDROCHLORIDE 25 MG/1
25 TABLET, FILM COATED ORAL 2 TIMES DAILY
Qty: 30 TABLET | Refills: 2 | Status: SHIPPED | OUTPATIENT
Start: 2023-03-15

## 2023-03-15 RX ORDER — BUDESONIDE AND FORMOTEROL FUMARATE DIHYDRATE 80; 4.5 UG/1; UG/1
2 AEROSOL RESPIRATORY (INHALATION) 2 TIMES DAILY
Qty: 10.2 G | Refills: 0 | Status: SHIPPED | OUTPATIENT
Start: 2023-03-15

## 2023-03-22 RX ORDER — OXYCODONE HYDROCHLORIDE AND ACETAMINOPHEN 5; 325 MG/1; MG/1
1 TABLET ORAL EVERY 4 HOURS PRN
Qty: 20 TABLET | Refills: 0 | Status: SHIPPED | OUTPATIENT
Start: 2023-03-22

## 2023-03-25 RX ORDER — LEVOFLOXACIN 5 MG/ML
750 INJECTION, SOLUTION INTRAVENOUS ONCE
OUTPATIENT
Start: 2023-03-30

## 2023-03-25 NOTE — H&P
Chief Complaint: Draining abdominal wall fluid collection and suture granuloma      History of Present Illness: Lyman Cranker is a 80-year-old white female one of our favorite patients on whom we performed many procedures over the past several years  She has a long and cold with past medical history  Open Vicenta-en-Y gastric bypass a month from our hospital in [place] in 2000  At that time she weighed about 600 pounds  She currently weighs about 300 pounds  She underwent a revision of her procedure in 2006  A subsequent tummy tuck 2007  Multiple incisional hernia repairs at the same time and since  Of course with mesh  Upper scopic assisted repair of hernia and lysis of adhesions 2008 subsequent laparoscopic cholecystectomy wound explorations for recurrent suture granulomatous etc   Most recently she had developed drainage from her wound in September 2022  It was serous fluid, no pus or succus entericus  We explored the wound drained it and it eventually healed  Recently she has developed recurrence of a suture granuloma at the superior aspect of her wound and also some drainage from her abdominal wall  Once again it appears serous in nature  No succus no bile no pus  She was placed on empiric antibiotics and is here today for reevaluation  Nothing is changed and she was originally seen 2 weeks ago  She still has drainage  She has a long history of psoriatic arthritis and is currently under fairly good control with Cosentyx and Humira        Past Medical History:   Past Medical History:   Diagnosis Date   • Anemia     hx of receiving iron infusions   • Anxiety    • Arthritis    • Asthma    • Chronic pain disorder     left humerus/right shoulder/psoriatic arhtritis chronic tendonitis   • Clotting disorder ([facility] [address] )     pt unsure of this,,,"did have to take coumadin for a blood clot/pulm emboli after a fracture surgery around 1995 or so"   • Exercise involving cycling     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   • GERD (gastroesophageal reflux disease)    • History of bariatric surgery 2006    Roun-Y ; weight loss of 400lbs and has gained 30-50 lbs back or so   • History of pneumonia    • History of transfusion 2000   • Limb alert care status     Left arm No BP/Labs or /IV's   • Muscle weakness     left Arm   • Psoriasis    • Psoriatic arthritis ([facility] [place] [address] ) 1995   • Pulmonary emboli ([facility] [place] [address] ) 1995    post humeral frac   • Radial nerve palsy, left     arm   • Suture granuloma    • Vitamin D deficiency    • Wears glasses     reading         Past Surgical History:    Past Surgical History:   Procedure Laterality Date   • ABDOMINAL SURGERY     • CHOLECYSTECTOMY     • COLON SURGERY     • COLONOSCOPY     • ESSURE TUBAL LIGATION     • FACIAL/NECK BIOPSY N/A 7/17/2020    Procedure: NECK EXPLORATION;  Surgeon: Danielle Riggins DO;  [facility];  Service: ENT   • FOREIGN BODY REMOVAL N/A 6/4/2021    Procedure: Removal of suture granuloma abdomen;  Surgeon: Ammon Maddox MD;  Location: [address] [place] [address] OR;  Service: General   • FRACTURE SURGERY  1995    humeral frac repair/rods/metal plates left arm   • GASTRIC BYPASS OPEN  2006   • HERNIA REPAIR      X3 with mesh   • INCISION AND DRAINAGE ANTERIOR NECK Right 6/16/2020    Procedure: INCISION AND DRAINAGE  (I&D) NECK;  Surgeon: Danielle Riggins DO;  [facility];  Service: ENT   • IVC FILTER INSERTION  1995    [place] filter   • PANNICULECTOMY     • HI EXCISION SUBMAXILLARY GLAND Right 6/12/2020    Procedure: EXCISION SUBMANDIBULAR GLAND;  Surgeon: Danielle Riggins DO;  [facility];  Service: ENT   • HI EXPLORE WOUND,ABDOMEN/FLANK/BACK N/A 1/31/2020    Procedure: exploation of abdominal wound removal suture granulomas and foreign body;  Surgeon: Ammon Maddox MD;  Location: [address] [place] [address] OR;  Service: General   • HI EXPLORE WOUND,ABDOMEN/FLANK/BACK N/A 3/2/2020    Procedure: EXPLORE WOUND OF BACK LEFT OPEN WITH PACKING;  Surgeon: Ammon Maddox MD;  Location: [address] [place] [address] OR;  Service: General   • NM EXPLORE WOUND,ABDOMEN/FLANK/BACK N/A 11/18/2021    Procedure: WOUND EXPLORATION REMOVAL OF SUTURE GRANULOMA;  Surgeon: Laura Gar MD;  Location: [address] [place] [address] OR;  Service: General   • NM EXPLORE WOUND,ABDOMEN/FLANK/BACK N/A 10/6/2022    Procedure: WOUND EXPLORATION AND REMOVAL SUTURE GRANULOMA, REMOVAL OF FOREIGN BODY OF ABDOMEN;  Surgeon: Laura Gar MD;  Location: [address] [place] [address] OR;  Service: General   • SMALL INTESTINE SURGERY     • VENA CAVA FILTER PLACEMENT           Allergies: Allergies   Allergen Reactions   • Penicillins Other (See Comments) and Anaphylaxis     "PARALYZES HER"  Was paralized    "PARALYZES HER"  Was paralized  "PARALYZES HER"  Was paralized  "PARALYZES HER"  Was paralyzed   • Morphine Itching   • Prednisone Abdominal Pain     Worsening psoriasis;     Has taken it if needed/as prescribed         Medications:    Current Outpatient Medications:   •  albuterol (PROVENTIL HFA,VENTOLIN HFA) 90 mcg/act inhaler, Inhale 2 puffs every 6 (six) hours as needed for wheezing or shortness of breath, Disp: 6 7 g, Rfl: 0  •  budesonide-formoterol (SYMBICORT) 80-4 5 MCG/ACT inhaler, Inhale 2 puffs 2 (two) times a day, Disp: 10 2 g, Rfl: 0  •  Cholecalciferol 50 MCG (2000 UT) CAPS, Take 1 capsule by mouth 5000 units daily, Disp: , Rfl:   •  ergocalciferol (VITAMIN D2) 50,000 units, , Disp: , Rfl:   •  hydrOXYzine HCL (ATARAX) 25 mg tablet, Take 1 tablet (25 mg total) by mouth 2 (two) times a day, Disp: 30 tablet, Rfl: 2  •  Ixekizumab (Taltz) 80 MG/ML SOAJ, Inject 80 mg under the skin every 14 (fourteen) days, Disp: , Rfl:   •  levofloxacin (LEVAQUIN) 500 mg tablet, Take 1 tablet (500 mg total) by mouth every 24 hours for 14 days, Disp: 14 tablet, Rfl: 10  •  loratadine (CLARITIN) 10 mg tablet, Take 10 mg by mouth 2 (two) times a day, Disp: , Rfl:   •  omeprazole (PriLOSEC) 20 mg delayed release capsule, Take 20 mg by mouth every morning , Disp: , Rfl:   •  oxyCODONE-acetaminophen (Percocet) 5-325 mg per tablet, Take 1 tablet by mouth every 4 (four) hours as needed for moderate pain Max Daily Amount: 6 tablets, Disp: 20 tablet, Rfl: 0  •  oxyCODONE-acetaminophen (Percocet) 5-325 mg per tablet, Take 1 tablet by mouth every 4 (four) hours as needed for moderate pain Max Daily Amount: 6 tablets, Disp: 20 tablet, Rfl: 0  •  oxyCODONE-acetaminophen (Percocet) 5-325 mg per tablet, Take 1 tablet by mouth every 4 (four) hours as needed for moderate pain Max Daily Amount: 6 tablets, Disp: 20 tablet, Rfl: 0  •  amLODIPine (NORVASC) 5 mg tablet, Take 5 mg by mouth daily (Patient not taking: Reported on 2022), Disp: , Rfl:   •  Iodoquinol-Hydrocortisone-Aloe 1-1 9 % CREA, Apply topically 4 (four) times a day (Patient not taking: Reported on 2022), Disp: 40 g, Rfl: 1  •  sertraline (ZOLOFT) 50 mg tablet, Take 50 mg by mouth daily (Patient not taking: Reported on 2022), Disp: , Rfl:       Social History:  Social History     Social History     Substance and Sexual Activity   Alcohol Use Not Currently    Comment: occasionally     Social History     Substance and Sexual Activity   Drug Use Never     Social History     Tobacco Use   Smoking Status Former   • Packs/day: 0 50   • Types: Cigarettes   • Quit date: 2021   • Years since quittin 0   Smokeless Tobacco Never         Family History:    Family History   Problem Relation Age of Onset   • Heart block Mother    • Diabetes Mother    • No Known Problems Father          Review of Systems:    As per the HPI  Also right shoulder pain and weakness  Chronic left upper extremity paresis  Arthralgias  Psoriatic skin lesions    No weight loss weight gain fever chills night sweats chest pain nausea vomit diarrhea constipation shortness of breath headaches blurry vision double vision sore throat chronic cough dysuria etc     Vitals:  Vitals:    03/15/23 1230   Pulse: 73   Temp: 99 °F (37 2 °C)   SpO2: 97%       Physical Exam:  Middle-aged obese white female 5 foot 7 inches 300 pounds  Vital signs as above    Skin warm dry  There is diffuse generalized psoriatic rash  Flaky skin  No drainage  Head normocephalic and atraumatic  Eyes PERRLA EOM intact  Ears nose within normal limits-throat X intact  Neck no masses I am able lymphadenopathy palpable  Back no CVA or spinal tenderness  Lungs clear to AMP  Cor regular rate in rhythm no murmur carotid bruits  Abdomen multiple scars are noted  Obese  Midline incision demonstrates no superior aspect an open area somewhat puckered in some serosanguineous fluid no pus  There is some edema of the skin and some mild erythema  No tenderness is present  Extremities left upper extremity demonstrates flaccid paralysis  Right upper extremity is limited movement secondary to pain  She has bilateral lower extremity edema 1+  Neurologically ANO x3 cranials 2-12 intact  Lymphatics no lymphadenopathy palpable  Lab Results: I have personally reviewed pertinent reports  See below  Imaging: I have personally reviewed pertinent reports  EKG, Pathology, and Other Studies: I have personally reviewed pertinent reports  No visits with results within 1 Day(s) from this visit  Latest known visit with results is:   Office Visit on 12/12/2022   Component Date Value   • SARS-CoV-2 12/12/2022 Negative    • INFLUENZA A PCR 12/12/2022 Negative    • INFLUENZA B PCR 12/12/2022 Negative          Impression:  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

## 2023-04-04 DIAGNOSIS — T81.89XD SUTURE GRANULOMA, SUBSEQUENT ENCOUNTER: Primary | ICD-10-CM

## 2023-04-04 RX ORDER — OXYCODONE HYDROCHLORIDE AND ACETAMINOPHEN 5; 325 MG/1; MG/1
1 TABLET ORAL EVERY 6 HOURS PRN
Qty: 20 TABLET | Refills: 0 | Status: SHIPPED | OUTPATIENT
Start: 2023-04-04 | End: 2023-04-17

## 2023-04-12 DIAGNOSIS — T81.89XD SUTURE GRANULOMA, SUBSEQUENT ENCOUNTER: Primary | ICD-10-CM

## 2023-04-12 RX ORDER — OXYCODONE HYDROCHLORIDE AND ACETAMINOPHEN 5; 325 MG/1; MG/1
1 TABLET ORAL EVERY 4 HOURS PRN
Qty: 20 TABLET | Refills: 0 | Status: SHIPPED | OUTPATIENT
Start: 2023-04-12

## 2023-04-19 ENCOUNTER — APPOINTMENT (OUTPATIENT)
Dept: LAB | Facility: CLINIC | Age: 58
End: 2023-04-19

## 2023-04-19 DIAGNOSIS — T81.89XA SUTURE GRANULOMA: ICD-10-CM

## 2023-04-19 DIAGNOSIS — S31.109A OPEN WOUND OF ABDOMINAL WALL: ICD-10-CM

## 2023-04-19 PROBLEM — E87.1 HYPONATREMIA: Status: RESOLVED | Noted: 2020-05-17 | Resolved: 2023-04-19

## 2023-04-19 PROBLEM — L40.50 PSORIATIC ARTHRITIS (HCC): Status: ACTIVE | Noted: 2023-04-19

## 2023-04-19 PROBLEM — J01.00 ACUTE MAXILLARY SINUSITIS: Status: RESOLVED | Noted: 2020-05-17 | Resolved: 2023-04-19

## 2023-04-19 LAB
ALBUMIN SERPL BCP-MCNC: 3.3 G/DL (ref 3.5–5)
ALP SERPL-CCNC: 110 U/L (ref 46–116)
ALT SERPL W P-5'-P-CCNC: 20 U/L (ref 12–78)
ANION GAP SERPL CALCULATED.3IONS-SCNC: 6 MMOL/L (ref 4–13)
AST SERPL W P-5'-P-CCNC: 24 U/L (ref 5–45)
BASOPHILS # BLD AUTO: 0.03 THOUSANDS/ΜL (ref 0–0.1)
BASOPHILS NFR BLD AUTO: 1 % (ref 0–1)
BILIRUB SERPL-MCNC: 0.63 MG/DL (ref 0.2–1)
BUN SERPL-MCNC: 8 MG/DL (ref 5–25)
CALCIUM ALBUM COR SERPL-MCNC: 9.5 MG/DL (ref 8.3–10.1)
CALCIUM SERPL-MCNC: 8.9 MG/DL (ref 8.3–10.1)
CHLORIDE SERPL-SCNC: 104 MMOL/L (ref 96–108)
CO2 SERPL-SCNC: 27 MMOL/L (ref 21–32)
CREAT SERPL-MCNC: 0.55 MG/DL (ref 0.6–1.3)
EOSINOPHIL # BLD AUTO: 0.06 THOUSAND/ΜL (ref 0–0.61)
EOSINOPHIL NFR BLD AUTO: 1 % (ref 0–6)
ERYTHROCYTE [DISTWIDTH] IN BLOOD BY AUTOMATED COUNT: 15.5 % (ref 11.6–15.1)
GFR SERPL CREATININE-BSD FRML MDRD: 104 ML/MIN/1.73SQ M
GLUCOSE P FAST SERPL-MCNC: 103 MG/DL (ref 65–99)
HCT VFR BLD AUTO: 40.5 % (ref 34.8–46.1)
HGB BLD-MCNC: 12.7 G/DL (ref 11.5–15.4)
IMM GRANULOCYTES # BLD AUTO: 0.01 THOUSAND/UL (ref 0–0.2)
IMM GRANULOCYTES NFR BLD AUTO: 0 % (ref 0–2)
LYMPHOCYTES # BLD AUTO: 1.15 THOUSANDS/ΜL (ref 0.6–4.47)
LYMPHOCYTES NFR BLD AUTO: 23 % (ref 14–44)
MCH RBC QN AUTO: 28.9 PG (ref 26.8–34.3)
MCHC RBC AUTO-ENTMCNC: 31.4 G/DL (ref 31.4–37.4)
MCV RBC AUTO: 92 FL (ref 82–98)
MONOCYTES # BLD AUTO: 0.51 THOUSAND/ΜL (ref 0.17–1.22)
MONOCYTES NFR BLD AUTO: 10 % (ref 4–12)
NEUTROPHILS # BLD AUTO: 3.18 THOUSANDS/ΜL (ref 1.85–7.62)
NEUTS SEG NFR BLD AUTO: 65 % (ref 43–75)
NRBC BLD AUTO-RTO: 0 /100 WBCS
PLATELET # BLD AUTO: 238 THOUSANDS/UL (ref 149–390)
PMV BLD AUTO: 10.8 FL (ref 8.9–12.7)
POTASSIUM SERPL-SCNC: 3.9 MMOL/L (ref 3.5–5.3)
PROT SERPL-MCNC: 7.4 G/DL (ref 6.4–8.4)
RBC # BLD AUTO: 4.39 MILLION/UL (ref 3.81–5.12)
SODIUM SERPL-SCNC: 137 MMOL/L (ref 135–147)
WBC # BLD AUTO: 4.94 THOUSAND/UL (ref 4.31–10.16)

## 2023-04-26 DIAGNOSIS — T81.89XD SUTURE GRANULOMA, SUBSEQUENT ENCOUNTER: Primary | ICD-10-CM

## 2023-04-26 DIAGNOSIS — S31.109D OPEN WOUND OF ABDOMINAL WALL, SUBSEQUENT ENCOUNTER: ICD-10-CM

## 2023-04-26 RX ORDER — OXYCODONE HYDROCHLORIDE AND ACETAMINOPHEN 5; 325 MG/1; MG/1
1 TABLET ORAL EVERY 4 HOURS PRN
Qty: 30 TABLET | Refills: 0 | Status: SHIPPED | OUTPATIENT
Start: 2023-04-26

## 2023-05-02 ENCOUNTER — OFFICE VISIT (OUTPATIENT)
Dept: SURGERY | Facility: CLINIC | Age: 58
End: 2023-05-02

## 2023-05-02 ENCOUNTER — APPOINTMENT (EMERGENCY)
Dept: CT IMAGING | Facility: HOSPITAL | Age: 58
End: 2023-05-02

## 2023-05-02 ENCOUNTER — HOSPITAL ENCOUNTER (INPATIENT)
Facility: HOSPITAL | Age: 58
LOS: 3 days | Discharge: HOME HEALTH | End: 2023-05-06
Attending: EMERGENCY MEDICINE | Admitting: INTERNAL MEDICINE

## 2023-05-02 VITALS
WEIGHT: 293 LBS | HEART RATE: 90 BPM | TEMPERATURE: 98 F | OXYGEN SATURATION: 97 % | BODY MASS INDEX: 45.99 KG/M2 | HEIGHT: 67 IN

## 2023-05-02 DIAGNOSIS — S31.109D OPEN WOUND OF ABDOMINAL WALL, SUBSEQUENT ENCOUNTER: Primary | ICD-10-CM

## 2023-05-02 DIAGNOSIS — T81.9XXA POSTOPERATIVE COMPLICATION: ICD-10-CM

## 2023-05-02 DIAGNOSIS — R53.81 MALAISE AND FATIGUE: ICD-10-CM

## 2023-05-02 DIAGNOSIS — I10 PRIMARY HYPERTENSION: ICD-10-CM

## 2023-05-02 DIAGNOSIS — R53.83 MALAISE AND FATIGUE: ICD-10-CM

## 2023-05-02 DIAGNOSIS — S31.109A OPEN ABDOMINAL WALL WOUND: Primary | ICD-10-CM

## 2023-05-02 LAB
ALBUMIN SERPL BCP-MCNC: 3.6 G/DL (ref 3.5–5)
ALP SERPL-CCNC: 97 U/L (ref 34–104)
ALT SERPL W P-5'-P-CCNC: 13 U/L (ref 7–52)
ANION GAP SERPL CALCULATED.3IONS-SCNC: 9 MMOL/L (ref 4–13)
AST SERPL W P-5'-P-CCNC: 33 U/L (ref 13–39)
BASOPHILS # BLD AUTO: 0.03 THOUSANDS/ÂΜL (ref 0–0.1)
BASOPHILS NFR BLD AUTO: 1 % (ref 0–1)
BILIRUB SERPL-MCNC: 0.77 MG/DL (ref 0.2–1)
BUN SERPL-MCNC: 12 MG/DL (ref 5–25)
CALCIUM SERPL-MCNC: 8.7 MG/DL (ref 8.4–10.2)
CHLORIDE SERPL-SCNC: 97 MMOL/L (ref 96–108)
CO2 SERPL-SCNC: 31 MMOL/L (ref 21–32)
CREAT SERPL-MCNC: 0.56 MG/DL (ref 0.6–1.3)
EOSINOPHIL # BLD AUTO: 0.06 THOUSAND/ÂΜL (ref 0–0.61)
EOSINOPHIL NFR BLD AUTO: 1 % (ref 0–6)
ERYTHROCYTE [DISTWIDTH] IN BLOOD BY AUTOMATED COUNT: 16.5 % (ref 11.6–15.1)
GFR SERPL CREATININE-BSD FRML MDRD: 103 ML/MIN/1.73SQ M
GLUCOSE SERPL-MCNC: 98 MG/DL (ref 65–140)
HCT VFR BLD AUTO: 40.4 % (ref 34.8–46.1)
HGB BLD-MCNC: 13.1 G/DL (ref 11.5–15.4)
IMM GRANULOCYTES # BLD AUTO: 0.01 THOUSAND/UL (ref 0–0.2)
IMM GRANULOCYTES NFR BLD AUTO: 0 % (ref 0–2)
LYMPHOCYTES # BLD AUTO: 1.35 THOUSANDS/ÂΜL (ref 0.6–4.47)
LYMPHOCYTES NFR BLD AUTO: 28 % (ref 14–44)
MCH RBC QN AUTO: 29.3 PG (ref 26.8–34.3)
MCHC RBC AUTO-ENTMCNC: 32.4 G/DL (ref 31.4–37.4)
MCV RBC AUTO: 90 FL (ref 82–98)
MONOCYTES # BLD AUTO: 0.4 THOUSAND/ÂΜL (ref 0.17–1.22)
MONOCYTES NFR BLD AUTO: 8 % (ref 4–12)
NEUTROPHILS # BLD AUTO: 2.9 THOUSANDS/ÂΜL (ref 1.85–7.62)
NEUTS SEG NFR BLD AUTO: 62 % (ref 43–75)
NRBC BLD AUTO-RTO: 0 /100 WBCS
PLATELET # BLD AUTO: 185 THOUSANDS/UL (ref 149–390)
PMV BLD AUTO: 10.1 FL (ref 8.9–12.7)
POTASSIUM SERPL-SCNC: 4.2 MMOL/L (ref 3.5–5.3)
PROT SERPL-MCNC: 7.2 G/DL (ref 6.4–8.4)
RBC # BLD AUTO: 4.47 MILLION/UL (ref 3.81–5.12)
SODIUM SERPL-SCNC: 137 MMOL/L (ref 135–147)
WBC # BLD AUTO: 4.75 THOUSAND/UL (ref 4.31–10.16)

## 2023-05-02 RX ORDER — METRONIDAZOLE 500 MG/100ML
500 INJECTION, SOLUTION INTRAVENOUS EVERY 8 HOURS
Status: DISCONTINUED | OUTPATIENT
Start: 2023-05-02 | End: 2023-05-03

## 2023-05-02 RX ORDER — OXYCODONE HYDROCHLORIDE 5 MG/1
5 TABLET ORAL EVERY 4 HOURS PRN
Status: DISCONTINUED | OUTPATIENT
Start: 2023-05-02 | End: 2023-05-06 | Stop reason: HOSPADM

## 2023-05-02 RX ORDER — NICOTINE 21 MG/24HR
1 PATCH, TRANSDERMAL 24 HOURS TRANSDERMAL DAILY
Status: DISCONTINUED | OUTPATIENT
Start: 2023-05-03 | End: 2023-05-06 | Stop reason: HOSPADM

## 2023-05-02 RX ORDER — ALBUTEROL SULFATE 90 UG/1
2 AEROSOL, METERED RESPIRATORY (INHALATION) EVERY 6 HOURS PRN
Status: DISCONTINUED | OUTPATIENT
Start: 2023-05-02 | End: 2023-05-06 | Stop reason: HOSPADM

## 2023-05-02 RX ORDER — ACETAMINOPHEN 325 MG/1
650 TABLET ORAL EVERY 4 HOURS PRN
Status: DISCONTINUED | OUTPATIENT
Start: 2023-05-02 | End: 2023-05-06 | Stop reason: HOSPADM

## 2023-05-02 RX ORDER — OXYCODONE HYDROCHLORIDE AND ACETAMINOPHEN 5; 325 MG/1; MG/1
1 TABLET ORAL EVERY 4 HOURS PRN
Status: DISCONTINUED | OUTPATIENT
Start: 2023-05-02 | End: 2023-05-02

## 2023-05-02 RX ORDER — CIPROFLOXACIN 2 MG/ML
400 INJECTION, SOLUTION INTRAVENOUS EVERY 12 HOURS
Status: DISCONTINUED | OUTPATIENT
Start: 2023-05-02 | End: 2023-05-03

## 2023-05-02 RX ORDER — LORATADINE 10 MG/1
10 TABLET ORAL 2 TIMES DAILY
Status: DISCONTINUED | OUTPATIENT
Start: 2023-05-02 | End: 2023-05-06 | Stop reason: HOSPADM

## 2023-05-02 RX ORDER — ONDANSETRON 4 MG/1
4 TABLET, ORALLY DISINTEGRATING ORAL ONCE
Status: COMPLETED | OUTPATIENT
Start: 2023-05-02 | End: 2023-05-02

## 2023-05-02 RX ORDER — HYDROMORPHONE HCL/PF 1 MG/ML
0.5 SYRINGE (ML) INJECTION EVERY 6 HOURS PRN
Status: DISCONTINUED | OUTPATIENT
Start: 2023-05-02 | End: 2023-05-06 | Stop reason: HOSPADM

## 2023-05-02 RX ORDER — HYDROXYZINE HYDROCHLORIDE 25 MG/1
25 TABLET, FILM COATED ORAL 2 TIMES DAILY
Status: DISCONTINUED | OUTPATIENT
Start: 2023-05-02 | End: 2023-05-03

## 2023-05-02 RX ORDER — OXYCODONE HYDROCHLORIDE 5 MG/1
10 TABLET ORAL EVERY 4 HOURS PRN
Status: DISCONTINUED | OUTPATIENT
Start: 2023-05-02 | End: 2023-05-06 | Stop reason: HOSPADM

## 2023-05-02 RX ORDER — BUDESONIDE AND FORMOTEROL FUMARATE DIHYDRATE 80; 4.5 UG/1; UG/1
2 AEROSOL RESPIRATORY (INHALATION) 2 TIMES DAILY
Status: DISCONTINUED | OUTPATIENT
Start: 2023-05-02 | End: 2023-05-06 | Stop reason: HOSPADM

## 2023-05-02 RX ORDER — ENOXAPARIN SODIUM 100 MG/ML
40 INJECTION SUBCUTANEOUS DAILY
Status: DISCONTINUED | OUTPATIENT
Start: 2023-05-03 | End: 2023-05-06 | Stop reason: HOSPADM

## 2023-05-02 RX ORDER — HYDROMORPHONE HCL/PF 1 MG/ML
0.5 SYRINGE (ML) INJECTION ONCE
Status: DISCONTINUED | OUTPATIENT
Start: 2023-05-02 | End: 2023-05-02

## 2023-05-02 RX ORDER — PANTOPRAZOLE SODIUM 40 MG/1
40 TABLET, DELAYED RELEASE ORAL
Status: DISCONTINUED | OUTPATIENT
Start: 2023-05-03 | End: 2023-05-06 | Stop reason: HOSPADM

## 2023-05-02 RX ORDER — ONDANSETRON 2 MG/ML
4 INJECTION INTRAMUSCULAR; INTRAVENOUS ONCE
Status: DISCONTINUED | OUTPATIENT
Start: 2023-05-02 | End: 2023-05-02

## 2023-05-02 RX ORDER — OXYCODONE HYDROCHLORIDE AND ACETAMINOPHEN 5; 325 MG/1; MG/1
1 TABLET ORAL ONCE
Status: COMPLETED | OUTPATIENT
Start: 2023-05-02 | End: 2023-05-02

## 2023-05-02 RX ORDER — ONDANSETRON 2 MG/ML
4 INJECTION INTRAMUSCULAR; INTRAVENOUS EVERY 6 HOURS PRN
Status: DISCONTINUED | OUTPATIENT
Start: 2023-05-02 | End: 2023-05-06 | Stop reason: HOSPADM

## 2023-05-02 RX ADMIN — IOHEXOL 70 ML: 350 INJECTION, SOLUTION INTRAVENOUS at 16:28

## 2023-05-02 RX ADMIN — CIPROFLOXACIN 400 MG: 2 INJECTION, SOLUTION INTRAVENOUS at 20:12

## 2023-05-02 RX ADMIN — METRONIDAZOLE 500 MG: 500 INJECTION, SOLUTION INTRAVENOUS at 21:21

## 2023-05-02 RX ADMIN — LORATADINE 10 MG: 10 TABLET ORAL at 20:24

## 2023-05-02 RX ADMIN — HYDROXYZINE HYDROCHLORIDE 25 MG: 25 TABLET ORAL at 20:24

## 2023-05-02 RX ADMIN — BUDESONIDE AND FORMOTEROL FUMARATE DIHYDRATE 2 PUFF: 80; 4.5 AEROSOL RESPIRATORY (INHALATION) at 20:22

## 2023-05-02 RX ADMIN — OXYCODONE HYDROCHLORIDE AND ACETAMINOPHEN 1 TABLET: 5; 325 TABLET ORAL at 17:41

## 2023-05-02 RX ADMIN — ONDANSETRON 4 MG: 4 TABLET, ORALLY DISINTEGRATING ORAL at 17:41

## 2023-05-02 RX ADMIN — HYDROMORPHONE HYDROCHLORIDE 0.5 MG: 1 INJECTION, SOLUTION INTRAMUSCULAR; INTRAVENOUS; SUBCUTANEOUS at 20:22

## 2023-05-02 RX ADMIN — OXYCODONE HYDROCHLORIDE 10 MG: 5 TABLET ORAL at 23:22

## 2023-05-02 NOTE — ASSESSMENT & PLAN NOTE
· Present on admission as evidenced by BMI of 49  · Counseled on weight loss and diet/lifestyle modifications

## 2023-05-02 NOTE — H&P
[address] [place] [address]  H&P  Name: Rudi Mosquera 62 y o  female I MRN: [id number]  Unit/Bed#: ED 07 I Date of Admission: 5/2/2023   Date of Service: 5/2/2023 I Hospital Day: 0      Assessment/Plan   * Open abdominal wall wound  Assessment & Plan  · Abdominal wound has developed suture granuloma which patient states is painful and draining fluid  · Presents from Dr Phyliss Kussmaul office  · Patient is nonseptic appearing  · Afebrile and without leukocytosis  · Initiate ciprofloxacin and Flagyl for now ; de-escalate antibiotics as appropriate ; patient has severe penicillin allergy  · Trend fever curve and WBC count    Gastroesophageal reflux disease without esophagitis  Assessment & Plan  · Continue home PPI    Psoriatic arthritis ([facility] )  Assessment & Plan  · Outpatient follow-up with Rheumatology    Pulmonary emphysema ([facility] )  Assessment & Plan  · Not in acute exacerbation  · Continue home albuterol and Symbicort  · Monitor respiratory status    Morbid (severe) obesity due to excess calories ([facility] )  Assessment & Plan  · Present on admission as evidenced by BMI of 49  · Counseled on weight loss and diet/lifestyle modifications    Tobacco dependence  Assessment & Plan  · Nicotine patch while inpatient         VTE Prophylaxis: Lovenox  Code Status: Level 1 full code    Anticipated Length of Stay:  Patient will be admitted on an Observation basis with an anticipated length of stay of  2 midnights  Justification for Hospital Stay: Abdominal pain    Total Time for Visit, including Counseling / Coordination of Care: 60 minutes  Greater than 50% of this total time spent on direct patient counseling and coordination of care      Chief Complaint: Abdominal pain    History of Present Illness:    Rudi Mosquera is a 62 y o  female with a past medical history significant for morbid obesity status post Vicenta-en-Y, multiple abdominal surgeries, emphysema, psoriatic arthritis who presents from her general "surgeon's office with complaints of abdominal pain  The patient was instructed to present to the ED for further evaluation as she states that serous drainage has been coming out of her abdominal wound over the course of the past few weeks  She is nonseptic appearing  CT abdomen pending  General surgery was consulted for management of open abdominal wound with serous drainage  Review of Systems:    Review of Systems   Constitutional: Negative for chills and fever  HENT: Negative for ear pain and sore throat  Eyes: Negative for pain and visual disturbance  Respiratory: Negative for cough and shortness of breath  Cardiovascular: Negative for chest pain and palpitations  Gastrointestinal: Positive for abdominal pain  Negative for vomiting  Genitourinary: Negative for dysuria and hematuria  Musculoskeletal: Negative for arthralgias and back pain  Skin: Negative for color change and rash  Neurological: Negative for seizures and syncope  All other systems reviewed and are negative        Past Medical and Surgical History:     Past Medical History:   Diagnosis Date   • Anemia     hx of receiving iron infusions   • Anxiety    • Arthritis    • Asthma    • Chronic pain disorder     left humerus/right shoulder/psoriatic arhtritis chronic tendonitis   • Clotting disorder ([facility] )     pt unsure of this,,,\"did have to take coumadin for a blood clot/pulm emboli after a fracture surgery around 1995 or so\"   • Exercise involving cycling     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   • GERD (gastroesophageal reflux disease)    • History of bariatric surgery 2006    Roun-Y ; weight loss of 400lbs and has gained 30-50 lbs back or so   • History of pneumonia    • History of transfusion 2000   • Limb alert care status     Left arm No BP/Labs or /IV's   • Muscle weakness     left Arm   • Psoriasis    • Psoriatic arthritis ([facility] ) 1995   • Pulmonary emboli ([facility] ) 1995    post humeral frac   • Radial nerve " palsy, left     arm   • Suture granuloma    • Vitamin D deficiency    • Wears glasses     reading       Past Surgical History:   Procedure Laterality Date   • ABDOMINAL SURGERY     • CHOLECYSTECTOMY     • COLON SURGERY     • COLONOSCOPY     • ESSURE TUBAL LIGATION     • FACIAL/NECK BIOPSY N/A 7/17/2020    Procedure: NECK EXPLORATION;  Surgeon: Mariella Pisaon DO;  [facility];  Service: ENT   • FOREIGN BODY REMOVAL N/A 6/4/2021    Procedure: Removal of suture granuloma abdomen;  Surgeon: Whitley Peacock MD;  Location: [address] [place] [address] MAIN OR;  Service: General   • FRACTURE SURGERY  1995    humeral frac repair/rods/metal plates left arm   • GASTRIC BYPASS OPEN  2006   • HERNIA REPAIR      X3 with mesh   • INCISION AND DRAINAGE ANTERIOR NECK Right 6/16/2020    Procedure: INCISION AND DRAINAGE  (I&D) NECK;  Surgeon: Mariella Pisano DO;  [facility];  Service: ENT   • IVC FILTER INSERTION  1995    radha filter   • PANNICULECTOMY     • NJ EXCISION SUBMANDIBULAR SUBMAXILLARY GLAND Right 6/12/2020    Procedure: EXCISION SUBMANDIBULAR GLAND;  Surgeon: Mariella Pisano DO;  [facility];  Service: ENT   • NJ EXPL PENETRATING WOUND SPX ABDOMEN/FLANK/BACK N/A 1/31/2020    Procedure: exploation of abdominal wound removal suture granulomas and foreign body;  Surgeon: Whitley Peacock MD;  Location: [address] [place] [address] MAIN OR;  Service: General   • NJ EXPL PENETRATING WOUND SPX ABDOMEN/FLANK/BACK N/A 3/2/2020    Procedure: EXPLORE WOUND OF BACK LEFT OPEN WITH PACKING;  Surgeon: Whitley Peacock MD;  Location: [address] [place] [address] OR;  Service: General   • NJ EXPL PENETRATING WOUND [facility] ABDOMEN/FLANK/BACK N/A 11/18/2021    Procedure: WOUND EXPLORATION REMOVAL OF SUTURE GRANULOMA;  Surgeon: Whitley Peacock MD;  Location: [address] [place] [address] MAIN OR;  Service: General   • NJ EXPL PENETRATING WOUND SPX ABDOMEN/FLANK/BACK N/A 10/6/2022    Procedure: WOUND EXPLORATION AND REMOVAL SUTURE GRANULOMA, REMOVAL OF FOREIGN BODY OF ABDOMEN;  Surgeon: Whitley Peacock MD;  Location: [address] [place] [address] "MAIN OR;  Service: General   • MN INCISION & DRAINAGE ABSCESS COMPLICATED/MULTIPLE N/A 4/20/2023    Procedure: INCISION AND DRAINAGE ABDOMINAL WALL FLUID COLLECTION AND WOUND EXPLORATION WITH DEBRIDEMENT TO THE FASCIA (SHARP AND BLUNT)   EXCISION OF FOREIGN BODY;  Surgeon: Francoise Howell MD;  Location: [address] [place] [address] MAIN OR;  Service: General   • SMALL INTESTINE SURGERY     • VENA CAVA FILTER PLACEMENT         Meds/Allergies:    Prior to Admission medications    Medication Sig Start Date End Date Taking? Authorizing Provider   albuterol (PROVENTIL HFA,VENTOLIN HFA) 90 mcg/act inhaler Inhale 2 puffs every 6 (six) hours as needed for wheezing or shortness of breath 12/12/22   TERRA Post   budesonide-formoterol [facility]) 80-4 5 MCG/ACT inhaler Inhale 2 puffs 2 (two) times a day 3/15/23   Francoise Howell MD   hydrOXYzine HCL (ATARAX) 25 mg tablet Take 1 tablet (25 mg total) by mouth 2 (two) times a day 3/15/23   Francoise Howell MD   Ixekizumab [facility]) 80 MG/ML SOAJ Inject 80 mg under the skin every 14 (fourteen) days 9/23/22   Historical Provider, MD   loratadine (CLARITIN) 10 mg tablet Take 10 mg by mouth 2 (two) times a day    Historical Provider, MD   omeprazole (PriLOSEC) 20 mg delayed release capsule Take 20 mg by mouth every morning     Historical Provider, MD   oxyCODONE-acetaminophen (Percocet) 5-325 mg per tablet Take 1 tablet by mouth every 4 (four) hours as needed for moderate pain Max Daily Amount: 6 tablets 4/12/23   Francoise Howell MD   oxyCODONE-acetaminophen (Percocet) 5-325 mg per tablet Take 1 tablet by mouth every 4 (four) hours as needed for severe pain Max Daily Amount: 6 tablets 4/26/23   Francoise Howell MD       Allergies:    Allergies   Allergen Reactions   • Penicillins Other (See Comments) and Anaphylaxis     \"PARALYZES HER\"  Was paralized    \"PARALYZES HER\"  Was paralized  \"PARALYZES HER\"  Was paralized  \"PARALYZES HER\"  Was paralyzed   • Morphine Itching   • Prednisone Abdominal Pain     Worsening " psoriasis; Has taken it if needed/as prescribed       Social History:     Marital Status: Single   Substance Use History:   Social History     Substance and Sexual Activity   Alcohol Use Not Currently    Comment: occasionally     Social History     Tobacco Use   Smoking Status Some Days   • Types: Cigarettes   • Last attempt to quit: 2021   • Years since quittin 1   Smokeless Tobacco Never     Social History     Substance and Sexual Activity   Drug Use Never       Family History:    Pertinent family history reviewed    Physical Exam:     Vitals:   Blood Pressure: (!) 187/114 (23)  Pulse: 86 (23)  Temperature: 98 6 °F (37 °C) (23)  Temp Source: Oral (23)  Respirations: 20 (23)  Weight - Scale: (!) 144 kg (317 lb 7 4 oz) (23)  SpO2: 95 % (23)    Physical Exam  Vitals and nursing note reviewed  Constitutional:       General: She is not in acute distress  Appearance: She is well-developed  HENT:      Head: Normocephalic and atraumatic  Eyes:      Conjunctiva/sclera: Conjunctivae normal    Cardiovascular:      Rate and Rhythm: Normal rate and regular rhythm  Heart sounds: No murmur heard  Pulmonary:      Effort: Pulmonary effort is normal  No respiratory distress  Breath sounds: Normal breath sounds  Abdominal:      Palpations: Abdomen is soft  Tenderness: There is no abdominal tenderness  Musculoskeletal:         General: No swelling  Cervical back: Neck supple  Skin:     General: Skin is warm and dry  Capillary Refill: Capillary refill takes less than 2 seconds  Neurological:      Mental Status: She is alert     Psychiatric:         Mood and Affect: Mood normal           Additional Data:     Lab Results: I have reviewed pertinent results     Results from last 7 days   Lab Units 23  1602   WBC Thousand/uL 4 75   HEMOGLOBIN g/dL 13 1   HEMATOCRIT % 40 4   PLATELETS Thousands/uL 185 NEUTROS PCT % 62   LYMPHS PCT % 28   MONOS PCT % 8   EOS PCT % 1     Results from last 7 days   Lab Units 05/02/23  1602   SODIUM mmol/L 137   POTASSIUM mmol/L 4 2   CHLORIDE mmol/L 97   CO2 mmol/L 31   BUN mg/dL 12   CREATININE mg/dL 0 56*   ANION GAP mmol/L 9   CALCIUM mg/dL 8 7   ALBUMIN g/dL 3 6   TOTAL BILIRUBIN mg/dL 0 77   ALK PHOS U/L 97   ALT U/L 13   AST U/L 33   GLUCOSE RANDOM mg/dL 98                       Imaging: I have reviewed pertinent imaging     CT abdomen pelvis with contrast    (Results Pending)       EKG, Pathology, and Other Studies Reviewed on Admission:   · EKG: Reviewed     Allscripts / Epic Records Reviewed    ** Please Note: This note has been constructed using a voice recognition system   **

## 2023-05-02 NOTE — ASSESSMENT & PLAN NOTE
· Abdominal wound has developed suture granuloma which patient states is painful and draining fluid  · Presents from Dr Larissa Ochoa office  · Patient is nonseptic appearing  · Afebrile and without leukocytosis  · Initiate ciprofloxacin and Flagyl for now ; de-escalate antibiotics as appropriate ; patient has severe penicillin allergy  · Trend fever curve and WBC count

## 2023-05-02 NOTE — ED PROVIDER NOTES
History  Chief Complaint   Patient presents with   • Abdominal Pain     States she was sent to ER by Dr Miguel Briones due to pain after having surgery on April 21h     62year-old female with significant past medical history including anemia, psoriatic arthritis, clotting disorder, chronic pain disorder presents to emergency department for possible wound infection  Head surgery on 4/20/2023 for incision and drainage of abdominal wall fluid collection  Had follow-up appointment today, was sent to ED for evaluation  Vomiting last night  Fever 102 F this morning, took tylenol at noon  Generalized weakness  Worsening pain around top incision, worsened over last couple days  Green drainage from the incision without the penrose drain since surgery on the 20th  Redness around top incision  Sutures also removed today  Penrose drain from lower incision was also removed  History provided by:  Patient and medical records  Abdominal Pain  Pain location: around  wounds   Associated symptoms: fatigue, fever, nausea and vomiting    Associated symptoms: no anorexia, no chest pain, no constipation, no diarrhea, no dysuria, no hematemesis, no hematochezia, no hematuria, no melena and no shortness of breath    Risk factors: multiple surgeries        Prior to Admission Medications   Prescriptions Last Dose Informant Patient Reported? Taking?    Ixekizumab (Taltz) 80 MG/ML SOAJ   Yes No   Sig: Inject 80 mg under the skin every 14 (fourteen) days   albuterol (PROVENTIL HFA,VENTOLIN HFA) 90 mcg/act inhaler   No No   Sig: Inhale 2 puffs every 6 (six) hours as needed for wheezing or shortness of breath   budesonide-formoterol (SYMBICORT) 80-4 5 MCG/ACT inhaler   No No   Sig: Inhale 2 puffs 2 (two) times a day   hydrOXYzine HCL (ATARAX) 25 mg tablet   No No   Sig: Take 1 tablet (25 mg total) by mouth 2 (two) times a day   loratadine (CLARITIN) 10 mg tablet   Yes No   Sig: Take 10 mg by mouth 2 (two) times a day   omeprazole (PriLOSEC) 20 "mg delayed release capsule   Yes No   Sig: Take 20 mg by mouth every morning    oxyCODONE-acetaminophen (Percocet) 5-325 mg per tablet   No No   Sig: Take 1 tablet by mouth every 4 (four) hours as needed for moderate pain Max Daily Amount: 6 tablets   oxyCODONE-acetaminophen (Percocet) 5-325 mg per tablet   No No   Sig: Take 1 tablet by mouth every 4 (four) hours as needed for severe pain Max Daily Amount: 6 tablets      Facility-Administered Medications: None       Past Medical History:   Diagnosis Date   • Anemia     hx of receiving iron infusions   • Anxiety    • Arthritis    • Asthma    • Chronic pain disorder     left humerus/right shoulder/psoriatic arhtritis chronic tendonitis   • Clotting disorder ([facility] [address] )     pt unsure of this,,,\"did have to take coumadin for a blood clot/pulm emboli after a fracture surgery around 1995 or so\"   • Exercise involving cycling     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   • GERD (gastroesophageal reflux disease)    • History of bariatric surgery 2006    Roun-Y ; weight loss of 400lbs and has gained 30-50 lbs back or so   • History of pneumonia    • History of transfusion 2000   • Limb alert care status     Left arm No BP/Labs or /IV's   • Muscle weakness     left Arm   • Psoriasis    • Psoriatic arthritis ([facility] [address] ) 1995   • Pul[facility] emboli [facility] ) 1995    post humeral frac   • Radial nerve palsy, left     arm   • Suture granuloma    • Vitamin D deficiency    • Wears glasses     reading       Past Surgical History:   Procedure Laterality Date   • ABDOMINAL SURGERY     • CHOLECYSTECTOMY     • COLON SURGERY     • COLONOSCOPY     • ESSURE TUBAL LIGATION     • FACIAL/NECK BIOPSY N/A 7/17/2020    Procedure: NECK EXPLORATION;  Surgeon: Aliyah Tinsley DO;  Location: [facility];  Service: ENT   • FOREIGN BODY REMOVAL N/A 6/4/2021    Procedure: Removal of suture granuloma abdomen;  Surgeon: Indigo Mahmood MD;  Location: [facility] MAIN OR;  Service: General   • [address] [place] [address]" humeral frac repair/rods/metal plates left arm   • GASTRIC BYPASS OPEN  2006   • HERNIA REPAIR      X3 with mesh   • INCISION AND DRAINAGE ANTERIOR NECK Right 6/16/2020    Procedure: INCISION AND DRAINAGE  (I&D) NECK;  Surgeon: Elvia Kwan DO;  Location: [facility];  Service: ENT   • IVC FILTER INSERTION  1995    [place] filter   • PANNICULECTOMY     • NC EXCISION SUBMANDIBULAR SUBMAXILLARY GLAND Right 6/12/2020    Procedure: EXCISION SUBMANDIBULAR GLAND;  Surgeon: Elvia Kwan DO;  Location: [facility];  Service: ENT   • NC EXPL PENETRATING WOUND SPX ABDOMEN/FLANK/BACK N/A 1/31/2020    Procedure: exploation of abdominal wound removal suture granulomas and foreign body;  Surgeon: Nidia Nguyễn MD;  Location: [address] [place] [address] OR;  Service: General   • NC EXPL PENETRATING WOUND SPX ABDOMEN/FLANK/BACK N/A 3/2/2020    Procedure: EXPLORE WOUND OF BACK LEFT OPEN WITH PACKING;  Surgeon: Nidia Nguyễn MD;  Location: [address] [place] [address] OR;  Service: General   • NC EXPL PENETRATING WOUND [facility] ABDOMEN/FLANK/BACK N/A 11/18/2021    Procedure: WOUND EXPLORATION REMOVAL OF SUTURE GRANULOMA;  Surgeon: Nidia Nguyễn MD;  Location: [address] [place] [address] OR;  Service: General   • NC EXPL PENETRATING WOUND [facility] ABDOMEN/FLANK/BACK N/A 10/6/2022    Procedure: WOUND EXPLORATION AND REMOVAL SUTURE GRANULOMA, REMOVAL OF FOREIGN BODY OF ABDOMEN;  Surgeon: Nidia Nguyễn MD;  Location: [address] [place] [address] OR;  Service: General   • NC INCISION & DRAINAGE ABSCESS COMPLICATED/MULTIPLE N/A 4/20/2023    Procedure: INCISION AND DRAINAGE ABDOMINAL WALL FLUID COLLECTION AND WOUND EXPLORATION WITH DEBRIDEMENT TO THE FASCIA (SHARP AND BLUNT)   EXCISION OF FOREIGN BODY;  Surgeon: Nidia Nguyễn MD;  Location: [address] [place] [address] OR;  Service: General   • SMALL INTESTINE SURGERY     • VENA CAVA FILTER PLACEMENT         Family History   Problem Relation Age of Onset   • Heart block Mother    • Diabetes Mother    • No Known Problems Father      I have reviewed and agree with the history as documented  E-Cigarette/Vaping   • E-Cigarette Use Never User      E-Cigarette/Vaping Substances   • Nicotine No    • THC No    • CBD No    • Flavoring No    • Other No    • Unknown No      Social History     Tobacco Use   • Smoking status: Some Days     Types: Cigarettes     Last attempt to quit: 2021     Years since quittin 1   • Smokeless tobacco: Never   Vaping Use   • Vaping Use: Never used   Substance Use Topics   • Alcohol use: Not Currently     Comment: occasionally   • Drug use: Never       Review of Systems   Constitutional: Positive for fatigue and fever  Negative for diaphoresis  HENT: Negative for trouble swallowing  Respiratory: Negative for shortness of breath  Cardiovascular: Negative for chest pain and palpitations  Gastrointestinal: Positive for abdominal pain, nausea and vomiting  Negative for anorexia, constipation, diarrhea, hematemesis, hematochezia and melena  Genitourinary: Negative for decreased urine volume, dysuria and hematuria  Musculoskeletal: Negative for joint swelling  Skin: Positive for color change, rash (patient states chronic psoriasis that has worsnened since she discontinued her mediacation for surgery ) and wound  Neurological: Negative for dizziness and numbness  Psychiatric/Behavioral: Negative for confusion  All other systems reviewed and are negative  Physical Exam  Physical Exam  Vitals and nursing note reviewed  Constitutional:       General: She is awake  She is not in acute distress  Appearance: Normal appearance  She is obese  She is not ill-appearing, toxic-appearing or diaphoretic  HENT:      Head: Normocephalic  Mouth/Throat:      Lips: Pink  Mouth: Mucous membranes are moist    Eyes:      General: Vision grossly intact  No scleral icterus  Cardiovascular:      Rate and Rhythm: Normal rate and regular rhythm  Heart sounds: Normal heart sounds     Pulmonary:      Effort: Pulmonary effort is normal  No respiratory distress  Breath sounds: Normal breath sounds  Abdominal:      General: A surgical scar is present  There is no distension  Palpations: Abdomen is soft  Tenderness: There is abdominal tenderness  There is no guarding or rebound  Comments: Surgical scars and 2 wounds noted  No sutures  Both draining  Surrounding erythema, warmth, swelling  Skin:     General: Skin is warm and dry  Capillary Refill: Capillary refill takes less than 2 seconds  Coloration: Skin is not jaundiced or mottled  Findings: Erythema, rash and wound present  No abscess  Neurological:      Mental Status: She is alert           Vital Signs  ED Triage Vitals [05/02/23 1516]   Temperature Pulse Respirations Blood Pressure SpO2   98 6 °F (37 °C) 86 20 (!) 187/114 95 %      Temp Source Heart Rate Source Patient Position - Orthostatic VS BP Location FiO2 (%)   Oral Monitor Sitting Right arm --      Pain Score       --           Vitals:    05/02/23 1516   BP: (!) 187/114   Pulse: 86   Patient Position - Orthostatic VS: Sitting         Visual Acuity      ED Medications  Medications   oxyCODONE-acetaminophen (PERCOCET) 5-325 mg per tablet 1 tablet (has no administration in time range)   ondansetron (ZOFRAN-ODT) dispersible tablet 4 mg (has no administration in time range)   albuterol (PROVENTIL HFA,VENTOLIN HFA) inhaler 2 puff (has no administration in time range)   budesonide-formoterol (SYMBICORT) 80-4 5 MCG/ACT inhaler 2 puff (has no administration in time range)   hydrOXYzine HCL (ATARAX) tablet 25 mg (has no administration in time range)   loratadine (CLARITIN) tablet 10 mg (has no administration in time range)   pantoprazole (PROTONIX) EC tablet 40 mg (has no administration in time range)   oxyCODONE-acetaminophen (PERCOCET) 5-325 mg per tablet 1 tablet (has no administration in time range)   acetaminophen (TYLENOL) tablet 650 mg (has no administration in time range)   ondansetron (ZOFRAN) injection 4 mg (has no administration in time range)   nicotine (NICODERM CQ) 21 mg/24 hr TD 24 hr patch 1 patch (has no administration in time range)   enoxaparin (LOVENOX) subcutaneous injection 40 mg (has no administration in time range)   metroNIDAZOLE (FLAGYL) IVPB (premix) 500 mg 100 mL (has no administration in time range)   ciprofloxacin (CIPRO) IVPB (premix in 5% dextrose) 400 mg 200 mL (has no administration in time range)   iohexol (OMNIPAQUE) 350 MG/ML injection (SINGLE-DOSE) 100 mL (70 mL Intravenous Given 5/2/23 1628)       Diagnostic Studies  Results Reviewed     Procedure Component Value Units Date/Time    Platelet count [[id number]]     Lab Status: No result Specimen: Blood     Comprehensive metabolic panel [[id number]]  (Abnormal) Collected: 05/02/23 1602    Lab Status: Final result Specimen: Blood from Arm, Right Updated: 05/02/23 1646     Sodium 137 mmol/L      Potassium 4 2 mmol/L      Chloride 97 mmol/L      CO2 31 mmol/L      ANION GAP 9 mmol/L      BUN 12 mg/dL      Creatinine 0 56 mg/dL      Glucose 98 mg/dL      Calcium 8 7 mg/dL      AST 33 U/L      ALT 13 U/L      Alkaline Phosphatase 97 U/L      Total Protein 7 2 g/dL      Albumin 3 6 g/dL      Total Bilirubin 0 77 mg/dL      eGFR 103 ml/min/1 73sq m     Narrative:      [place] guidelines for Chronic Kidney Disease (CKD):   •  Stage 1 with normal or high GFR (GFR > 90 mL/min/1 73 square meters)  •  Stage 2 Mild CKD (GFR = 60-89 mL/min/1 73 square meters)  •  Stage 3A Moderate CKD (GFR = 45-59 mL/min/1 73 square meters)  •  Stage 3B Moderate CKD (GFR = 30-44 mL/min/1 73 square meters)  •  Stage 4 Severe CKD (GFR = 15-29 mL/min/1 73 square meters)  •  Stage 5 End Stage CKD (GFR <15 mL/min/1 73 square meters)  Note: GFR calculation is accurate only with a steady state creatinine    CBC and differential [[id number]]  (Abnormal) Collected: 05/02/23 1602    Lab Status: Final result Specimen: Blood from Arm, Right Updated: 05/02/23 1614     WBC 4 75 Thousand/uL      RBC 4 47 Million/uL      Hemoglobin 13 1 g/dL      Hematocrit 40 4 %      MCV 90 fL      MCH 29 3 pg      MCHC 32 4 g/dL      RDW 16 5 %      MPV 10 1 fL      Platelets 565 Thousands/uL      nRBC 0 /100 WBCs      Neutrophils Relative 62 %      Immat GRANS % 0 %      Lymphocytes Relative 28 %      Monocytes Relative 8 %      Eosinophils Relative 1 %      Basophils Relative 1 %      Neutrophils Absolute 2 90 Thousands/µL      Immature Grans Absolute 0 01 Thousand/uL      Lymphocytes Absolute 1 35 Thousands/µL      Monocytes Absolute 0 40 Thousand/µL      Eosinophils Absolute 0 06 Thousand/µL      Basophils Absolute 0 03 Thousands/µL     Blood culture #2 [[id number]] Collected: 05/02/23 1602    Lab Status: In process Specimen: Blood from Arm, Right Updated: 05/02/23 1610    Blood culture #1 [[id number]] Collected: 05/02/23 1602    Lab Status: In process Specimen: Blood from Arm, Right Updated: 05/02/23 1610    Wound culture and Gram stain [[id number]] Collected: 05/02/23 1602    Lab Status: In process Specimen: Wound from Abdominal Updated: 05/02/23 1610                 CT abdomen pelvis with contrast    (Results Pending)              Procedures  Procedures         ED Course                               SBIRT 22yo+    Flowsheet Row Most Recent Value   Initial Alcohol Screen: US AUDIT-C     1  How often do you have a drink containing alcohol? 0 Filed at: 05/02/2023 1526   2  How many drinks containing alcohol do you have on a typical day you are drinking? 0 Filed at: 05/02/2023 1526   3a  Male UNDER 65: How often do you have five or more drinks on one occasion? 0 Filed at: 05/02/2023 1526   3b  FEMALE Any Age, or MALE 65+: How often do you have 4 or more drinks on one occassion? 0 Filed at: 05/02/2023 1526   Audit-C Score 0 Filed at: 05/02/2023 1526   MELLO: How many times in the past year have you        Used an illegal drug or used a prescription medication for "non-medical reasons? Never Filed at: 05/02/2023 1526                    Medical Decision Making  Patient presenting for fever, malaise, vomiting, worsening pain surrounding surgical wounds from surgery on 4/20/2023  Was seen for follow-up in office today and sent to ED for evaluation of possible infection  Afebrile in ED  No tachycardia  No SIRS criteria  CBC, CMP, wound and blood cultures ordered  CT abdomen pelvis ordered with contrast   During imaging extravasation of contrast occurred, patient remained neurovascularly intact on evaluation after  Appropriate protocols followed  Discussed with Dr Elissa Hanna of surgery, repeat imaging not necessary at this time  No leukocytosis  Kidney function within normal limits  Hemoglobin within normal limits  Electrolytes within normal limits  Wound is tender drainage noted, surrounded erythema and tenderness  Patient to be admitted for antibiotics, evaluation by Dr Elissa Hanna  All imaging and/or lab testing discussed with patient  Patient and/or family members verbalizes understanding and agrees with plan for admission  Patient is stable for admission      Portions of the record may have been created with voice recognition software  Occasional wrong word or \"sound a like\" substitutions may have occurred due to the inherent limitations of voice recognition software  Read the chart carefully and recognize, using context, where substitutions have occurred  Amount and/or Complexity of Data Reviewed  Labs: ordered  Risk  Prescription drug management  Decision regarding hospitalization  Disposition  Final diagnoses:   Postoperative complication     Time reflects when diagnosis was documented in both MDM as applicable and the Disposition within this note     Time User Action Codes Description Comment    5/2/2023  5:21 PM Trinidad Amado Add [T81  9XXA] Postoperative complication     3/6/2669  5:23 PM Hoda Chavez Add [S31 109D] Open wound of " abdominal wall, subsequent encounter       ED Disposition     ED Disposition   Admit    Condition   Stable    Date/Time   Tue May 2, 2023  5:21 PM    Comment   Case was discussed with PAYAL and the patient's admission status was agreed to be Admission Status: observation status to the service of Dr Cheryle Coppersmith   Follow-up Information    None         Patient's Medications   Discharge Prescriptions    No medications on file       No discharge procedures on file      PDMP Review       Value Time User    PDMP Reviewed  Yes 6/3/2021 10:03 AM Nino Dick PA-C          ED Provider  Electronically Signed by           Trey Anand PA-C  05/02/23 1942

## 2023-05-03 PROBLEM — I10 PRIMARY HYPERTENSION: Status: ACTIVE | Noted: 2023-05-03

## 2023-05-03 LAB
ANION GAP SERPL CALCULATED.3IONS-SCNC: 11 MMOL/L (ref 4–13)
BASOPHILS # BLD AUTO: 0.03 THOUSANDS/ÂΜL (ref 0–0.1)
BASOPHILS NFR BLD AUTO: 1 % (ref 0–1)
BUN SERPL-MCNC: 11 MG/DL (ref 5–25)
CALCIUM SERPL-MCNC: 8.3 MG/DL (ref 8.4–10.2)
CHLORIDE SERPL-SCNC: 99 MMOL/L (ref 96–108)
CO2 SERPL-SCNC: 26 MMOL/L (ref 21–32)
CREAT SERPL-MCNC: 0.55 MG/DL (ref 0.6–1.3)
EOSINOPHIL # BLD AUTO: 0.09 THOUSAND/ÂΜL (ref 0–0.61)
EOSINOPHIL NFR BLD AUTO: 2 % (ref 0–6)
ERYTHROCYTE [DISTWIDTH] IN BLOOD BY AUTOMATED COUNT: 16.6 % (ref 11.6–15.1)
GFR SERPL CREATININE-BSD FRML MDRD: 104 ML/MIN/1.73SQ M
GLUCOSE SERPL-MCNC: 79 MG/DL (ref 65–140)
HCT VFR BLD AUTO: 37.5 % (ref 34.8–46.1)
HGB BLD-MCNC: 11.6 G/DL (ref 11.5–15.4)
IMM GRANULOCYTES # BLD AUTO: 0.01 THOUSAND/UL (ref 0–0.2)
IMM GRANULOCYTES NFR BLD AUTO: 0 % (ref 0–2)
LYMPHOCYTES # BLD AUTO: 1.28 THOUSANDS/ÂΜL (ref 0.6–4.47)
LYMPHOCYTES NFR BLD AUTO: 32 % (ref 14–44)
MAGNESIUM SERPL-MCNC: 2.2 MG/DL (ref 1.9–2.7)
MCH RBC QN AUTO: 28.9 PG (ref 26.8–34.3)
MCHC RBC AUTO-ENTMCNC: 30.9 G/DL (ref 31.4–37.4)
MCV RBC AUTO: 94 FL (ref 82–98)
MONOCYTES # BLD AUTO: 0.48 THOUSAND/ÂΜL (ref 0.17–1.22)
MONOCYTES NFR BLD AUTO: 12 % (ref 4–12)
NEUTROPHILS # BLD AUTO: 2.16 THOUSANDS/ÂΜL (ref 1.85–7.62)
NEUTS SEG NFR BLD AUTO: 53 % (ref 43–75)
NRBC BLD AUTO-RTO: 0 /100 WBCS
PHOSPHATE SERPL-MCNC: 4 MG/DL (ref 2.7–4.5)
PLATELET # BLD AUTO: 168 THOUSANDS/UL (ref 149–390)
PMV BLD AUTO: 11 FL (ref 8.9–12.7)
POTASSIUM SERPL-SCNC: 3.8 MMOL/L (ref 3.5–5.3)
RBC # BLD AUTO: 4.01 MILLION/UL (ref 3.81–5.12)
SODIUM SERPL-SCNC: 136 MMOL/L (ref 135–147)
WBC # BLD AUTO: 4.05 THOUSAND/UL (ref 4.31–10.16)

## 2023-05-03 RX ORDER — METRONIDAZOLE 500 MG/1
500 TABLET ORAL EVERY 8 HOURS SCHEDULED
Status: DISCONTINUED | OUTPATIENT
Start: 2023-05-03 | End: 2023-05-05

## 2023-05-03 RX ORDER — AMLODIPINE BESYLATE 5 MG/1
5 TABLET ORAL DAILY
Status: DISCONTINUED | OUTPATIENT
Start: 2023-05-03 | End: 2023-05-06 | Stop reason: HOSPADM

## 2023-05-03 RX ORDER — HYDROXYZINE HYDROCHLORIDE 25 MG/1
25 TABLET, FILM COATED ORAL
Status: DISCONTINUED | OUTPATIENT
Start: 2023-05-03 | End: 2023-05-06 | Stop reason: HOSPADM

## 2023-05-03 RX ORDER — CHLORTHALIDONE 25 MG/1
25 TABLET ORAL DAILY
Status: DISCONTINUED | OUTPATIENT
Start: 2023-05-03 | End: 2023-05-06 | Stop reason: HOSPADM

## 2023-05-03 RX ORDER — HYDRALAZINE HYDROCHLORIDE 20 MG/ML
5 INJECTION INTRAMUSCULAR; INTRAVENOUS EVERY 6 HOURS PRN
Status: DISCONTINUED | OUTPATIENT
Start: 2023-05-03 | End: 2023-05-06 | Stop reason: HOSPADM

## 2023-05-03 RX ORDER — CIPROFLOXACIN 250 MG/1
500 TABLET, FILM COATED ORAL EVERY 12 HOURS SCHEDULED
Status: DISCONTINUED | OUTPATIENT
Start: 2023-05-03 | End: 2023-05-05

## 2023-05-03 RX ADMIN — OXYCODONE HYDROCHLORIDE 10 MG: 5 TABLET ORAL at 12:19

## 2023-05-03 RX ADMIN — METRONIDAZOLE 500 MG: 500 TABLET ORAL at 22:04

## 2023-05-03 RX ADMIN — AMLODIPINE BESYLATE 5 MG: 5 TABLET ORAL at 09:08

## 2023-05-03 RX ADMIN — PANTOPRAZOLE SODIUM 40 MG: 40 TABLET, DELAYED RELEASE ORAL at 05:29

## 2023-05-03 RX ADMIN — METRONIDAZOLE 500 MG: 500 TABLET ORAL at 09:03

## 2023-05-03 RX ADMIN — OXYCODONE HYDROCHLORIDE 10 MG: 5 TABLET ORAL at 22:57

## 2023-05-03 RX ADMIN — LORATADINE 10 MG: 10 TABLET ORAL at 08:59

## 2023-05-03 RX ADMIN — CIPROFLOXACIN HYDROCHLORIDE 500 MG: 250 TABLET, FILM COATED ORAL at 20:16

## 2023-05-03 RX ADMIN — METRONIDAZOLE 500 MG: 500 INJECTION, SOLUTION INTRAVENOUS at 04:24

## 2023-05-03 RX ADMIN — HYDROXYZINE HYDROCHLORIDE 25 MG: 25 TABLET ORAL at 23:03

## 2023-05-03 RX ADMIN — LORATADINE 10 MG: 10 TABLET ORAL at 20:16

## 2023-05-03 RX ADMIN — OXYCODONE HYDROCHLORIDE 10 MG: 5 TABLET ORAL at 17:23

## 2023-05-03 RX ADMIN — HYDROXYZINE HYDROCHLORIDE 25 MG: 25 TABLET ORAL at 09:07

## 2023-05-03 RX ADMIN — CHLORTHALIDONE 25 MG: 25 TABLET ORAL at 09:01

## 2023-05-03 RX ADMIN — NICOTINE 1 PATCH: 21 PATCH, EXTENDED RELEASE TRANSDERMAL at 09:02

## 2023-05-03 RX ADMIN — ALBUTEROL SULFATE 2 PUFF: 90 AEROSOL, METERED RESPIRATORY (INHALATION) at 02:46

## 2023-05-03 RX ADMIN — OXYCODONE HYDROCHLORIDE 10 MG: 5 TABLET ORAL at 02:50

## 2023-05-03 RX ADMIN — BUDESONIDE AND FORMOTEROL FUMARATE DIHYDRATE 2 PUFF: 80; 4.5 AEROSOL RESPIRATORY (INHALATION) at 09:09

## 2023-05-03 RX ADMIN — ENOXAPARIN SODIUM 40 MG: 40 INJECTION SUBCUTANEOUS at 09:04

## 2023-05-03 RX ADMIN — HYDROMORPHONE HYDROCHLORIDE 0.5 MG: 1 INJECTION, SOLUTION INTRAMUSCULAR; INTRAVENOUS; SUBCUTANEOUS at 20:16

## 2023-05-03 RX ADMIN — HYDRALAZINE HYDROCHLORIDE 5 MG: 20 INJECTION INTRAMUSCULAR; INTRAVENOUS at 09:03

## 2023-05-03 RX ADMIN — HYDROMORPHONE HYDROCHLORIDE 0.5 MG: 1 INJECTION, SOLUTION INTRAMUSCULAR; INTRAVENOUS; SUBCUTANEOUS at 09:09

## 2023-05-03 RX ADMIN — BUDESONIDE AND FORMOTEROL FUMARATE DIHYDRATE 2 PUFF: 80; 4.5 AEROSOL RESPIRATORY (INHALATION) at 17:16

## 2023-05-03 NOTE — UTILIZATION REVIEW
Initial Clinical Review    Pt initially admitted as Observation on 5/2  Changed to Inpatient on 5/3  Pt requiring continued stay d/t ongoing wound management  Admission: Date/Time/Statement:   Admission Orders (From admission, onward)     Ordered        05/03/23 1516  Inpatient Admission  Once            05/02/23 1721  Place in Observation  Once                      Orders Placed This Encounter   Procedures   • Inpatient Admission     Standing Status:   Standing     Number of Occurrences:   1     Order Specific Question:   Level of Care     Answer:   Med Surg [16]     Order Specific Question:   Estimated length of stay     Answer:   More than 2 Midnights     Order Specific Question:   Certification     Answer:   I certify that inpatient services are medically necessary for this patient for a duration of greater than two midnights  See H&P and MD Progress Notes for additional information about the patient's course of treatment  ED Arrival Information     Expected   -    Arrival   5/2/2023 14:18    Acuity   Urgent            Means of arrival   Walk-In    Escorted by   Self    Service   Hospitalist    Admission type   Emergency            Arrival complaint   fever, post op issue           Chief Complaint   Patient presents with   • Abdominal Pain     States she was sent to ER by Dr Elissa Hanna due to pain after having surgery on April 20th       Initial Presentation: 62 y o  female who presented self from home to [address] [place], [place] [address] ED  Admitted in observation status for evaluation and treatment of abdominal wound s/p surgery  PMHx: anemia, anxiety, asthma, chronic pain, GERD, hx bariatric surgery, PE  Presented w/ abdominal pain  Noted to have serous drainage from abdominal wound over past few weeks  On exam, abdominal wound  Plan: IV ABX, continue PTA meds, supportive care, Trend labs, replete electrolytes as needed, CT abdomen  General Surgery consulted        5/3 Changed to Inpatient Status: Pt endorsing mild abdominal pain  On exam, obese  Plan: continue IV ABX, trend fever curve, Trend labs, replete electrolytes as needed; continue PTA meds, start amlodipine and chlorthalidone; supportive care  General Surgery: Pt w/ 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 mesh placement in the abdominal wall many years ago  Explored 2 weeks ago  Recurrent wound dehiscence  Local wound care  05/04/23  Day 2: Endorses abdominal pain  Exam unchanged  WBC 2 94  Plan: continue IV ABX, Trend labs, replete electrolytes as needed; local wound care, continue current meds, supportive care         ED Triage Vitals   Temperature Pulse Respirations Blood Pressure SpO2   05/02/23 1516 05/02/23 1516 05/02/23 1516 05/02/23 1516 05/02/23 1516   98 6 °F (37 °C) 86 20 (!) 187/114 95 %      Temp Source Heart Rate Source Patient Position - Orthostatic VS BP Location FiO2 (%)   05/02/23 1516 05/02/23 1516 05/02/23 1516 05/02/23 1516 --   Oral Monitor Sitting Right arm       Pain Score       05/02/23 1741       8          Wt Readings from Last 1 Encounters:   05/02/23 (!) 145 kg (319 lb 3 6 oz)     Additional Vital Signs:   Date/Time Temp Pulse Resp BP MAP (mmHg) SpO2 O2 Device   05/04/23 0715 96 8 °F (36 °C) Abnormal  69 17 156/99 115 92 % None (Room air)   05/03/23 2300 -- -- -- 142/91 107 -- --   05/03/23 2210 98 2 °F (36 8 °C) 63 16 184/101 Abnormal  138 95 % None (Room air)   05/03/23 1513 97 1 °F (36 2 °C) Abnormal  71 18 140/76 -- 95 % None (Room air)   05/03/23 1200 96 2 °F (35 7 °C) Abnormal  75 18 144/84 -- 95 % None (Room air)     Date/Time Temp Pulse Resp BP MAP (mmHg) SpO2 O2 Device   05/03/23 0803 -- -- -- 191/101 Abnormal  -- -- --   05/03/23 0801 96 2 °F (35 7 °C) Abnormal  74 20 176/100 Abnormal  -- 94 % None (Room air)   05/02/23 2317 97 6 °F (36 4 °C) 74 18 181/102 Abnormal  139 97 % None (Room air)   05/02/23 1853 97 9 °F (36 6 °C) 74 18 212/110 Abnormal  136 97 % None (Room air)   05/02/23 1756 -- 73 18 169/63 -- 96 % None (Room air)     Pertinent Labs/Diagnostic Test Results:   CT abdomen pelvis with contrast   Final Result by Emerson Aguilera MD (05/02 1740)      Anterior abdominal wall postsurgical changes without an apparent discrete collection on these noncontrast images  Workstation performed: [id number]               Results from last 7 days   Lab Units 05/04/23  0511 05/03/23  0456 05/02/23  1602   WBC Thousand/uL 2 94* 4 05* 4 75   HEMOGLOBIN g/dL 12 4 11 6 13 1   HEMATOCRIT % 39 2 37 5 40 4   PLATELETS Thousands/uL 167 168 185   NEUTROS ABS Thousands/µL 1 37* 2 16 2 90         Results from last 7 days   Lab Units 05/04/23  0511 05/03/23  0456 05/02/23  1602   SODIUM mmol/L 136 136 137   POTASSIUM mmol/L 3 9 3 8 4 2   CHLORIDE mmol/L 97 99 97   CO2 mmol/L 32 26 31   ANION GAP mmol/L 7 11 9   BUN mg/dL 9 11 12   CREATININE mg/dL 0 57* 0 55* 0 56*   EGFR ml/min/1 73sq m 103 104 103   CALCIUM mg/dL 8 8 8 3* 8 7   MAGNESIUM mg/dL 2 1 2 2  --    PHOSPHORUS mg/dL 4 3 4 0  --      Results from last 7 days   Lab Units 05/02/23  1602   AST U/L 33   ALT U/L 13   ALK PHOS U/L 97   TOTAL PROTEIN g/dL 7 2   ALBUMIN g/dL 3 6   TOTAL BILIRUBIN mg/dL 0 77         Results from last 7 days   Lab Units 05/04/23  0511 05/03/23  0456 05/02/23  1602   GLUCOSE RANDOM mg/dL 93 79 98     Results from last 7 days   Lab Units 05/02/23  1602   BLOOD CULTURE  Received in Microbiology Lab  Culture in Progress  Received in Microbiology Lab  Culture in Progress     GRAM STAIN RESULT  1+ Polys  No bacteria seen   WOUND CULTURE  1+ Growth of Staphylococcus aureus*         ED Treatment:   Medication Administration from 05/02/2023 1418 to 05/02/2023 1834       Date/Time Order Dose Route Action     05/02/2023 1628 EDT iohexol (OMNIPAQUE) 350 MG/ML injection (SINGLE-DOSE) 100 mL 70 mL Intravenous Given     05/02/2023 1741 EDT oxyCODONE-acetaminophen (PERCOCET) 5-325 mg per tablet 1 tablet 1 tablet Oral Given     05/02/2023 1741 EDT ondansetron "(ZOFRAN-ODT) dispersible tablet 4 mg 4 mg Oral Given        Past Medical History:   Diagnosis Date   • Anemia     hx of receiving iron infusions   • Anxiety    • Arthritis    • Asthma    • Chronic pain disorder     left humerus/right shoulder/psoriatic arhtritis chronic tendonitis   • Clotting disorder ([facility] )     pt unsure of this,,,\"did have to take coumadin for a blood clot/pulm emboli after a fracture surgery around 1995 or so\"   • Exercise involving cycling     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   • GERD (gastroesophageal reflux disease)    • History of bariatric surgery 2006    Roun-Y ; weight loss of 400lbs and has gained 30-50 lbs back or so   • History of pneumonia    • History of transfusion 2000   • Limb alert care status     Left arm No BP/Labs or /IV's   • Muscle weakness     left Arm   • Psoriasis    • Psoriatic arthritis ([facility] ) 1995   • Pulmonary emboli ([facility] ) 1995    post humeral frac   • Radial nerve palsy, left     arm   • Suture granuloma    • Vitamin D deficiency    • Wears glasses     reading     Present on Admission:  • Postoperative complication of skin involving drainage from surgical wound  • Gastroesophageal reflux disease without esophagitis  • Psoriatic arthritis ([facility] )  • Pulmonary emphysema ([facility] [address] )  • Morbid (severe) obesity due to excess calories ([facility] )  • Tobacco dependence  • Primary hypertension      Admitting Diagnosis: Postoperative complication [C50  9XXA]  Abdominal pain [R10 9]  Open wound of abdominal wall, subsequent encounter [S31 109D]  Age/Sex: 62 y o  female  Admission Orders:  Consistent Carbohydrate Diet  Accu-checks ACHS  SCDs      Scheduled Medications:  amLODIPine, 5 mg, Oral, Daily  budesonide-formoterol, 2 puff, Inhalation, BID  chlorthalidone, 25 mg, Oral, Daily  ciprofloxacin, 400 mg, Intravenous, Q12H ZEYAD  enoxaparin, 40 mg, Subcutaneous, Daily  hydrOXYzine HCL, 25 mg, Oral, HS  loratadine, 10 mg, Oral, BID  metroNIDAZOLE, 500 mg, Intravenous, Q8H " [address]  nicotine, 1 patch, Transdermal, Daily  pantoprazole, 40 mg, Oral, Early Morning      Continuous IV Infusions: none    PRN Meds:  acetaminophen, 650 mg, Oral, Q4H PRN  albuterol, 2 puff, Inhalation, Q6H PRN; 5/3 x1  hydrALAZINE, 5 mg, Intravenous, Q6H PRN; 5/3 x1  HYDROmorphone, 0 5 mg, Intravenous, Q6H PRN; 5/2 x1, 5/3 x2  ondansetron, 4 mg, Intravenous, Q6H PRN  oxyCODONE, 10 mg, Oral, Q4H PRN; 5/2 x1, 5/3 x4  oxyCODONE, 5 mg, Oral, Q4H PRN        IP CONSULT TO CASE MANAGEMENT  IP CONSULT TO ACUTE CARE SURGERY    Network Utilization Review Department  ATTENTION: Please call with any questions or concerns to [phone] and carefully listen to the prompts so that you are directed to the right person  All voicemails are confidential   Cheyenne Michelle all requests for admission clinical reviews, approved or denied determinations and any other requests to dedicated fax number below belonging to the campus where the patient is receiving treatment   List of dedicated fax numbers for the Facilities:  [address] DENIALS (Administrative/Medical Necessity) [phone]   [address] (Maternity/NICU/Pediatrics) [phone]   910 Areli Carver [phone]   [facility] [address] [phone]   [address] [place] [address] Antione Mariscal 28 [phone]   [facility] 134 815 [facility] [phone]

## 2023-05-03 NOTE — CASE MANAGEMENT
Case Management Discharge Planning Note    Patient name Dominic Messer  Location 7T U 710/7T [facility] 710-01 MRN [id number]  : 1965 Date 5/3/2023       Current Admission Date: 2023  Current Admission Diagnosis:Open abdominal wall wound   Patient Active Problem List    Diagnosis Date Noted   • Primary hypertension 2023   • Psoriatic arthritis ([address] ) 2023   • Right flank pain 2022   • Hematuria 2022   • Neck pain 2022   • Tear of right infraspinatus tendon 2022   • Tear of right supraspinatus tendon 2022   • Internal derangement of shoulder, right 2022   • Morbid (severe) obesity due to excess calories ([address] ) 2021   • Deep vein thrombosis (DVT) of lower extremity ([address] ) 2021   • H/O bariatric surgery 2021   • Gastroesophageal reflux disease without esophagitis 2021   • Immunocompromised ([address] ) 2021   • Coagulation disorder ([address] ) 2021   • Arthritis 2021   • Other chronic pain 2021   • Pulmonary emphysema ([address] ) 2021   • Smoking 2021   • Neck abscess 2020   • Wound dehiscence 2020   • Submandibular sialoadenitis 2020   • Iron deficiency anemia 2020   • Elevated TSH 2020   • Insomnia 2020   • Tobacco dependence 2020   • Back wound 2020   • Open abdominal wall wound 2020      LOS (days): 0  Geometric Mean LOS (GMLOS) (days):   Days to GMLOS:     OBJECTIVE:            Current admission status: Observation   Preferred Pharmacy:   [facility]pharmacy [id number], [address] [facility]  Phone: [phone] Fax: [phone]    Primary Care Provider: Kim Garcia MD    Primary Insurance: MEDICARE  Secondary Insurance: [address] [place] [address]    DISCHARGE DETAILS:    Discharge planning discussed with[de-identified] Patient  Freedom of Choice: Yes  Comments - Freedom of Choice: Pt agreeable to [facility] for [facility] AT [facility]     Were Treatment Team discharge recommendations reviewed with patient/caregiver?: Yes  Did patient/caregiver verbalize understanding of patient care needs?: Yes  Were patient/caregiver advised of the risks associated with not following Treatment Team discharge recommendations?: Yes         [address]         Is the patient interested in [facility] at discharge?: Yes  [address] requested[de-identified] [address] Name[de-identified] Other ([address])  [address] Provider[de-identified] PCP  [address] Needed[de-identified] Wound/Ostomy Care  Homebound Criteria Met[de-identified] Immunosuppressed  Supporting Clincal Findings[de-identified] Limited Endurance, Fatigues Easliy in [employer]    DME Referral Provided  Referral made for DME?: No    Other Referral/Resources/Interventions Provided:  Interventions: [facility]         Treatment Team Recommendation: Home with [address] (for wound care)  Discharge Destination Plan[de-identified] Home with [address] [employer]

## 2023-05-03 NOTE — PROGRESS NOTES
[facility]  Progress Note  Name: Coni Arana  MRN: [id number]  Unit/Bed#: 7T [facility] 710-01 I Date of Admission: 5/2/2023   Date of Service: 5/3/2023 I Hospital Day: 0    Assessment/Plan   * Open abdominal wall wound  Assessment & Plan  · Abdominal wound has developed suture granuloma which patient states is painful and draining fluid  · Presents from Dr Oleksandr Benítez office  · Patient is nonseptic appearing  · Afebrile and without leukocytosis  · Initiate ciprofloxacin and Flagyl for now ; de-escalate antibiotics as appropriate ; patient has severe penicillin allergy  · Trend fever curve and WBC count  · General Surgery consultation ; appreciate recommendations    Gastroesophageal reflux disease without esophagitis  Assessment & Plan  · Continue home PPI    Primary hypertension  Assessment & Plan  · Patient significantly hypertensive during hospital course  · We will initiate amlodipine 5 mg daily and chlorthalidone 25 mg daily  · Hydralazine 5 mg IV every 6 hours as needed for SBP greater than 160 mmHg  · Monitor blood pressures    Psoriatic arthritis ([facility] )  Assessment & Plan  · Outpatient follow-up with Rheumatology    Pulmonary emphysema ([facility] )  Assessment & Plan  · Not in acute exacerbation  · Continue home albuterol and Symbicort  · Monitor respiratory status    Morbid (severe) obesity due to excess calories ([facility] )  Assessment & Plan  · Present on admission as evidenced by BMI of 49  · Counseled on weight loss and diet/lifestyle modifications    Tobacco dependence  Assessment & Plan  · Nicotine patch while inpatient       VTE Pharmacologic Prophylaxis: Lovenox    Patient Centered Rounds:  Patient care rounds were performed with nursing    Discussions with Specialists or Other Care Team Provider: General Surgery    Education and Discussions with Family / Patient: Spoke with patient at bedside    Time Spent for Care: 30  More than 50% of total time spent on counseling and coordination of care as described above  Current Length of Stay: 0 day(s)    Current Patient Status: Observation     Certification Statement: The patient will continue to require additional inpatient hospital stay due to abdominal pain    Discharge Plan: Home with family support once medically stable    Code Status: Level 1 - Full Code      Subjective:   Patient seen and examined at bedside  No acute events overnight  Endorses mild abdominal pain  Very nonseptic appearing  Objective:     Vitals:   Temp (24hrs), Av 7 °F (36 5 °C), Min:96 2 °F (35 7 °C), Max:98 6 °F (37 °C)    Temp:  [96 2 °F (35 7 °C)-98 6 °F (37 °C)] 96 2 °F (35 7 °C)  HR:  [73-90] 74  Resp:  [18-20] 20  BP: (169-212)/() 191/101  SpO2:  [94 %-97 %] 94 %  Body mass index is 50 kg/m²  Input and Output Summary (last 24 hours): Intake/Output Summary (Last 24 hours) at 5/3/2023 0850  Last data filed at 2023 2100  Gross per 24 hour   Intake 480 ml   Output --   Net 480 ml       Physical Exam:     Physical Exam  Vitals and nursing note reviewed  Constitutional:       General: She is not in acute distress  Appearance: She is well-developed  She is obese  HENT:      Head: Normocephalic and atraumatic  Eyes:      Conjunctiva/sclera: Conjunctivae normal    Cardiovascular:      Rate and Rhythm: Normal rate and regular rhythm  Heart sounds: No murmur heard  Pulmonary:      Effort: Pulmonary effort is normal  No respiratory distress  Breath sounds: Normal breath sounds  Abdominal:      Palpations: Abdomen is soft  Tenderness: There is no abdominal tenderness  Musculoskeletal:         General: No swelling  Cervical back: Neck supple  Skin:     General: Skin is warm and dry  Capillary Refill: Capillary refill takes less than 2 seconds  Neurological:      Mental Status: She is alert  Psychiatric:         Mood and Affect: Mood normal          Additional Data:     Labs:  I have reviewed pertinent results     Results from last 7 days   Lab Units 05/03/23  0456   WBC Thousand/uL 4 05*   HEMOGLOBIN g/dL 11 6   HEMATOCRIT % 37 5   PLATELETS Thousands/uL 168   NEUTROS PCT % 53   LYMPHS PCT % 32   MONOS PCT % 12   EOS PCT % 2     Results from last 7 days   Lab Units 05/03/23  0456 05/02/23  1602   SODIUM mmol/L 136 137   POTASSIUM mmol/L 3 8 4 2   CHLORIDE mmol/L 99 97   CO2 mmol/L 26 31   BUN mg/dL 11 12   CREATININE mg/dL 0 55* 0 56*   ANION GAP mmol/L 11 9   CALCIUM mg/dL 8 3* 8 7   ALBUMIN g/dL  --  3 6   TOTAL BILIRUBIN mg/dL  --  0 77   ALK PHOS U/L  --  97   ALT U/L  --  13   AST U/L  --  33   GLUCOSE RANDOM mg/dL 79 98                         Imaging: I have reviewed pertinent imaging       Recent Cultures (last 7 days):           Last 24 Hours Medication List:   Current Facility-Administered Medications   Medication Dose Route Frequency Provider Last Rate   • acetaminophen  650 mg Oral Q4H PRN [facility],      • albuterol  2 puff Inhalation Q6H PRN [facility],      • amLODIPine  5 mg Oral Daily [facility],      • budesonide-formoterol  2 puff Inhalation BID [facility],      • chlorthalidone  25 mg Oral Daily [facility]     • ciprofloxacin  500 mg Oral Q12H [address] [facility]     • enoxaparin  40 mg Subcutaneous Daily [facility]     • hydrALAZINE  5 mg Intravenous Q6H PRN [facility],      • HYDROmorphone  0 5 mg Intravenous Q6H PRN [facility]     • hydrOXYzine HCL  25 mg Oral BID [facility]     • loratadine  10 mg Oral BID [facility]     • metroNIDAZOLE  500 mg Oral Q8H [address] [facility]     • nicotine  1 patch Transdermal Daily [facility],      • ondansetron  4 mg Intravenous Q6H PRN [facility],      • oxyCODONE  10 mg Oral Q4H PRN [facility],      • oxyCODONE  5 mg Oral Q4H PRN Federico León DO     • pantoprazole  40 mg Oral Early Morning Federico León DO          Today, Patient Was Seen By: Milagros Petersen DO    ** Please Note: Dictation voice to text software may have been used in the creation of this document   **

## 2023-05-03 NOTE — NURSING NOTE
Shaquille text sent to pts RN to follow up after extravasation of contrast into R arm after CT Scan on 5/2/2023  Per pts RN Rex Mcpherson, area is a little hard and still remains a little painful  No redness, blistering, swelling, changes in sensation or increase/decrease in temperature of skin  RN encouraged elevation of extremity and application of ice to the site for 20-60 minutes three times a day as needed until symptoms resolve    Continued management per inpatient care team

## 2023-05-03 NOTE — CONSULTS
Patient well-known to me  Long complicated history  Recurrent suture granulomas from previous mesh placement in the abdominal wall many years ago  Explored 2 weeks ago  Recurrent wound dehiscence  Wound care consulted for recommendations in regards to care of this recurring problem  Wound culture Staph aureus most likely contaminant    We will follow with you  Appreciate internal medicine help  Await wound care recommendations in this difficult situation

## 2023-05-03 NOTE — ASSESSMENT & PLAN NOTE
· Patient significantly hypertensive during hospital course  · We will initiate amlodipine 5 mg daily and chlorthalidone 25 mg daily  · Hydralazine 5 mg IV every 6 hours as needed for SBP greater than 160 mmHg  · Monitor blood pressures

## 2023-05-03 NOTE — PLAN OF CARE
Problem: Prexisting or High Potential for Compromised Skin Integrity  Goal: Skin integrity is maintained or improved  Description: INTERVENTIONS:  - Identify patients at risk for skin breakdown  - Assess and monitor skin integrity  - Assess and monitor nutrition and hydration status  - Monitor labs   - Assess for incontinence   - Turn and reposition patient  - Assist with mobility/ambulation  - Relieve pressure over bony prominences  - Avoid friction and shearing  - Provide appropriate hygiene as needed including keeping skin clean and dry  - Evaluate need for skin moisturizer/barrier cream  - Collaborate with interdisciplinary team   - Patient/family teaching  - Consider wound care consult   Outcome: Progressing     Problem: PAIN - ADULT  Goal: Verbalizes/displays adequate comfort level or baseline comfort level  Description: Interventions:  - Encourage patient to monitor pain and request assistance  - Assess pain using appropriate pain scale  - Administer analgesics based on type and severity of pain and evaluate response  - Implement non-pharmacological measures as appropriate and evaluate response  - Consider cultural and social influences on pain and pain management  - Notify physician/advanced practitioner if interventions unsuccessful or patient reports new pain  Outcome: Progressing     Problem: INFECTION - ADULT  Goal: Absence or prevention of progression during hospitalization  Description: INTERVENTIONS:  - Assess and monitor for signs and symptoms of infection  - Monitor lab/diagnostic results  - Monitor all insertion sites, i e  indwelling lines, tubes, and drains  - Monitor endotracheal if appropriate and nasal secretions for changes in amount and color  - [place] appropriate cooling/warming therapies per order  - Administer medications as ordered  - Instruct and encourage patient and family to use good hand hygiene technique  - Identify and instruct in appropriate isolation precautions for identified infection/condition  Outcome: Progressing  Goal: Absence of fever/infection during neutropenic period  Description: INTERVENTIONS:  - Monitor WBC    Outcome: Progressing     Problem: Knowledge Deficit  Goal: Patient/family/caregiver demonstrates understanding of disease process, treatment plan, medications, and discharge instructions  Description: Complete learning assessment and assess knowledge base    Interventions:  - Provide teaching at level of understanding  - Provide teaching via preferred learning methods  Outcome: Progressing

## 2023-05-03 NOTE — ACP (ADVANCE CARE PLANNING)
Advanced Care Planning Progress Note    Serious Illness Conversation    1  What is your understanding now of where you are with your illness? Prognostic Understanding: appropriate understanding of prognosis     2  How much information about what is likely to be ahead with your illness would you like to have? Information: patient wants to be fully informed     3  What did you (clinician) communicate to the patient? Prognostic Communication: Uncertain - It can be difficult to predict what will happen with your illness  I hope you will continue to live well for a long time but I’m worried that you could get sick quickly, and I think it is important to prepare for that possibility  4  If your health situation worsens, what are your most important goals? Goals: live as long as possible, no matter what, be independent, not be a burden, be at home     5  What are the biggest fears and worries about the future and your health? Fears/Worries: preparing for death, being a financial burden     6  What abilities are so critical to your life that you cannot imagine living without them? Unacceptable Function: being unable to communicate effectively, not being able to care for myself, including toileting and feeding, being unable to interact with others     7  What gives you strength as you think about the future with your illness? 8  If you become sicker, how much are you willing to go through for the possibility of gaining more time? Be in the hospital: Yes Have a feeding tube: Yes   Be in the ICU: Yes Live in a nursing home: No   Be on a ventilator: Yes Be uncomfortable: No   Be on dialysis: No Undergo aggressive test and/or procedures: Yes   9  How much does your proxy and family know about your priorities and wishes? Discussion Discussion: extensive discussion with family about goals and wishes     Veronica Bush heard you say that being at home and comfortable is really important to you   Keeping that in mind, and what we know about your illness, I recommend that we continue medical treatment for now  This will help us make sure that your treatment plans reflect what’s important to you  How does this plan sound to you? I will do everything I can to help you through this    Patient verbalized understanding of the plan     I have spent 45 minutes speaking with my patient on advanced care planning today or during this visit     Advanced directives  Five Wishes: Patient does not have Five Wishes- would not like information         Eliel Cruz, DO

## 2023-05-03 NOTE — ASSESSMENT & PLAN NOTE
· Abdominal wound has developed suture granuloma which patient states is painful and draining fluid  · Presents from Dr Enio Rizzo office  · Patient is nonseptic appearing  · Afebrile and without leukocytosis  · Initiate ciprofloxacin and Flagyl for now ; de-escalate antibiotics as appropriate ; patient has severe penicillin allergy  · Trend fever curve and WBC count  · General Surgery consultation ; appreciate recommendations

## 2023-05-03 NOTE — PROGRESS NOTES
Yasmany Lovelace was seen in the office for postop visit status post wound exploration and drainage of an abdominal wall fluid collection  Today in the office she feels ill  She was febrile last night to 101  She also vomited  At this point we will refer her to the emergency room at [address] [facility] for evaluation and work-up  We discussed the findings with internal medicine and they said they would be happy to see the patient and evaluate her after she was seen in the emergency room  I will see her in consultation

## 2023-05-03 NOTE — PLAN OF CARE
Problem: Potential for Falls  Goal: Patient will remain free of falls  Description: INTERVENTIONS:  - Educate patient/family on patient safety including physical limitations  - Instruct patient to call for assistance with activity   - Consult OT/PT to assist with strengthening/mobility   - Keep Call bell within reach  - Keep bed low and locked with side rails adjusted as appropriate  - Keep care items and personal belongings within reach  - Initiate and maintain comfort rounds  - Make Fall Risk Sign visible to staff    - Apply yellow socks and bracelet for high fall risk patients  - Consider moving patient to room near nurses station  Outcome: Progressing     Problem: Prexisting or High Potential for Compromised Skin Integrity  Goal: Skin integrity is maintained or improved  Description: INTERVENTIONS:  - Identify patients at risk for skin breakdown  - Assess and monitor skin integrity  - Assess and monitor nutrition and hydration status  - Monitor labs   - Assess for incontinence   - Turn and reposition patient  - Assist with mobility/ambulation  - Relieve pressure over bony prominences  - Avoid friction and shearing  - Provide appropriate hygiene as needed including keeping skin clean and dry  - Evaluate need for skin moisturizer/barrier cream  - Collaborate with interdisciplinary team   - Patient/family teaching  - Consider wound care consult   Outcome: Progressing     Problem: PAIN - ADULT  Goal: Verbalizes/displays adequate comfort level or baseline comfort level  Description: Interventions:  - Encourage patient to monitor pain and request assistance  - Assess pain using appropriate pain scale  - Administer analgesics based on type and severity of pain and evaluate response  - Implement non-pharmacological measures as appropriate and evaluate response  - Consider cultural and social influences on pain and pain management  - Notify physician/advanced practitioner if interventions unsuccessful or patient reports new pain  Outcome: Progressing     Problem: INFECTION - ADULT  Goal: Absence or prevention of progression during hospitalization  Description: INTERVENTIONS:  - Assess and monitor for signs and symptoms of infection  - Monitor lab/diagnostic results  - Monitor all insertion sites, i e  indwelling lines, tubes, and drains  - Monitor endotracheal if appropriate and nasal secretions for changes in amount and color  - [place] appropriate cooling/warming therapies per order  - Administer medications as ordered  - Instruct and encourage patient and family to use good hand hygiene technique  - Identify and instruct in appropriate isolation precautions for identified infection/condition  Outcome: Progressing  Goal: Absence of fever/infection during neutropenic period  Description: INTERVENTIONS:  - Monitor WBC    Outcome: Progressing     Problem: SAFETY ADULT  Goal: Patient will remain free of falls  Description: INTERVENTIONS:  - Educate patient/family on patient safety including physical limitations  - Instruct patient to call for assistance with activity   - Consult OT/PT to assist with strengthening/mobility   - Keep Call bell within reach  - Keep bed low and locked with side rails adjusted as appropriate  - Keep care items and personal belongings within reach  - Initiate and maintain comfort rounds  - Make Fall Risk Sign visible to staff    - Apply yellow socks and bracelet for high fall risk patients  - Consider moving patient to room near nurses station  Outcome: Progressing  Goal: Maintain or return to baseline ADL function  Description: INTERVENTIONS:  -  Assess patient's ability to carry out ADLs; assess patient's baseline for ADL function and identify physical deficits which impact ability to perform ADLs (bathing, care of mouth/teeth, toileting, grooming, dressing, etc )  - Assess/evaluate cause of self-care deficits   - Assess range of motion  - Assess patient's mobility; develop plan if impaired  - Assess patient's need for assistive devices and provide as appropriate  - Encourage maximum independence but intervene and supervise when necessary  - Involve family in performance of ADLs  - Assess for home care needs following discharge   - Consider OT consult to assist with ADL evaluation and planning for discharge  - Provide patient education as appropriate  Outcome: Progressing  Goal: Maintains/Returns to pre admission functional level  Description: INTERVENTIONS:  - Perform BMAT or MOVE assessment daily    - Set and communicate daily mobility goal to care team and patient/family/caregiver  - Collaborate with rehabilitation services on mobility goals if consulted    - Record patient progress and toleration of activity level   Outcome: Progressing     Problem: DISCHARGE PLANNING  Goal: Discharge to home or other facility with appropriate resources  Description: INTERVENTIONS:  - Identify barriers to discharge w/patient and caregiver  - Arrange for needed discharge resources and transportation as appropriate  - Identify discharge learning needs (meds, wound care, etc )  - Arrange for interpretive services to assist at discharge as needed  - Refer to Case Management Department for coordinating discharge planning if the patient needs post-hospital services based on physician/advanced practitioner order or complex needs related to functional status, cognitive ability, or social support system  Outcome: Progressing     Problem: Knowledge Deficit  Goal: Patient/family/caregiver demonstrates understanding of disease process, treatment plan, medications, and discharge instructions  Description: Complete learning assessment and assess knowledge base    Interventions:  - Provide teaching at level of understanding  - Provide teaching via preferred learning methods  Outcome: Progressing

## 2023-05-03 NOTE — CASE MANAGEMENT
Case Management Discharge Planning Note    Patient name Venkatesh Man  Location [facility] 710-01 MRN [id number]  : 1965 Date 5/3/2023       Current Admission Date: 2023  Current Admission Diagnosis:Open abdominal wall wound   Patient Active Problem List    Diagnosis Date Noted   • Primary hypertension 2023   • Psoriatic arthritis ([address] ) 2023   • Right flank pain 2022   • Hematuria 2022   • Neck pain 2022   • Tear of right infraspinatus tendon 2022   • Tear of right supraspinatus tendon 2022   • Internal derangement of shoulder, right 2022   • Morbid (severe) obesity due to excess calories ([address] ) 2021   • Deep vein thrombosis (DVT) of lower extremity ([address] ) 2021   • H/O bariatric surgery 2021   • Gastroesophageal reflux disease without esophagitis 2021   • Immunocompromised ([address] ) 2021   • Coagulation disorder ([address] ) 2021   • Arthritis 2021   • Other chronic pain 2021   • Pulmonary emphysema ([address] ) 2021   • Smoking 2021   • Neck abscess 2020   • Wound dehiscence 2020   • Submandibular sialoadenitis 2020   • Iron deficiency anemia 2020   • Elevated TSH 2020   • Insomnia 2020   • Tobacco dependence 2020   • Back wound 2020   • Open abdominal wall wound 2020      LOS (days): 0  Geometric Mean LOS (GMLOS) (days):   Days to GMLOS:     OBJECTIVE:            Current admission status: Observation   Preferred Pharmacy:   [id number]- [place], [address]  [facility]  Phone: [phone] Fax: [phone]    Primary Care Provider: Fareed Geiger MD    Primary Insurance: MEDICARE  Secondary Insurance: [facility]    DISCHARGE DETAILS:       Freedom of Choice: Yes  Comments - Freedom of Choice: Agreeable to [address]   Referrals placed  CM contacted How Severe Is Your Skin Lesion?: mild Has Your Skin Lesion Been Treated?: not been treated family/caregiver?: No- see comments (declined need)  Were Treatment Team discharge recommendations reviewed with patient/caregiver?: Yes  Did patient/caregiver verbalize understanding of patient care needs?: Yes  Were patient/caregiver advised of the risks associated with not following Treatment Team discharge recommendations?: Yes         [address]         Is the patient interested in [facility] at discharge?: Yes  [address] requested[de-identified] [address] Name[de-identified] Other  [address] [place] [address] Provider[de-identified] PCP  [address] Needed[de-identified] Wound/Ostomy Care  Homebound Criteria Met[de-identified] Immunosuppressed  Supporting Clincal Findings[de-identified] Limited Endurance    DME Referral Provided  Referral made for DME?: No    Other Referral/Resources/Interventions Provided:  Interventions: C  Referral Comments: referral placed via aidin         Treatment Team Recommendation: Home with [address] (for wound care)  Discharge Destination Plan[de-identified] Home with [address] Is This A New Presentation, Or A Follow-Up?: Skin Lesions Has Your Skin Lesion Been Treated?: been treated Is This A New Presentation, Or A Follow-Up?: Skin Lesion

## 2023-05-04 PROBLEM — L76.82 POSTOPERATIVE COMPLICATION OF SKIN INVOLVING DRAINAGE FROM SURGICAL WOUND: Status: ACTIVE | Noted: 2020-01-29

## 2023-05-04 LAB
ANION GAP SERPL CALCULATED.3IONS-SCNC: 7 MMOL/L (ref 4–13)
BACTERIA WND AEROBE CULT: ABNORMAL
BASOPHILS # BLD AUTO: 0.03 THOUSANDS/ÂΜL (ref 0–0.1)
BASOPHILS NFR BLD AUTO: 1 % (ref 0–1)
BUN SERPL-MCNC: 9 MG/DL (ref 5–25)
CALCIUM SERPL-MCNC: 8.8 MG/DL (ref 8.4–10.2)
CHLORIDE SERPL-SCNC: 97 MMOL/L (ref 96–108)
CO2 SERPL-SCNC: 32 MMOL/L (ref 21–32)
CREAT SERPL-MCNC: 0.57 MG/DL (ref 0.6–1.3)
EOSINOPHIL # BLD AUTO: 0.08 THOUSAND/ÂΜL (ref 0–0.61)
EOSINOPHIL NFR BLD AUTO: 3 % (ref 0–6)
ERYTHROCYTE [DISTWIDTH] IN BLOOD BY AUTOMATED COUNT: 17 % (ref 11.6–15.1)
GFR SERPL CREATININE-BSD FRML MDRD: 103 ML/MIN/1.73SQ M
GLUCOSE SERPL-MCNC: 93 MG/DL (ref 65–140)
GRAM STN SPEC: ABNORMAL
GRAM STN SPEC: ABNORMAL
HCT VFR BLD AUTO: 39.2 % (ref 34.8–46.1)
HGB BLD-MCNC: 12.4 G/DL (ref 11.5–15.4)
IMM GRANULOCYTES # BLD AUTO: 0 THOUSAND/UL (ref 0–0.2)
IMM GRANULOCYTES NFR BLD AUTO: 0 % (ref 0–2)
LYMPHOCYTES # BLD AUTO: 1.06 THOUSANDS/ÂΜL (ref 0.6–4.47)
LYMPHOCYTES NFR BLD AUTO: 36 % (ref 14–44)
MAGNESIUM SERPL-MCNC: 2.1 MG/DL (ref 1.9–2.7)
MCH RBC QN AUTO: 29.7 PG (ref 26.8–34.3)
MCHC RBC AUTO-ENTMCNC: 31.6 G/DL (ref 31.4–37.4)
MCV RBC AUTO: 94 FL (ref 82–98)
MONOCYTES # BLD AUTO: 0.4 THOUSAND/ÂΜL (ref 0.17–1.22)
MONOCYTES NFR BLD AUTO: 14 % (ref 4–12)
NEUTROPHILS # BLD AUTO: 1.37 THOUSANDS/ÂΜL (ref 1.85–7.62)
NEUTS SEG NFR BLD AUTO: 46 % (ref 43–75)
NRBC BLD AUTO-RTO: 0 /100 WBCS
PHOSPHATE SERPL-MCNC: 4.3 MG/DL (ref 2.7–4.5)
PLATELET # BLD AUTO: 167 THOUSANDS/UL (ref 149–390)
PMV BLD AUTO: 10.9 FL (ref 8.9–12.7)
POTASSIUM SERPL-SCNC: 3.9 MMOL/L (ref 3.5–5.3)
RBC # BLD AUTO: 4.17 MILLION/UL (ref 3.81–5.12)
SODIUM SERPL-SCNC: 136 MMOL/L (ref 135–147)
WBC # BLD AUTO: 2.94 THOUSAND/UL (ref 4.31–10.16)

## 2023-05-04 RX ORDER — LEVALBUTEROL INHALATION SOLUTION 1.25 MG/3ML
1.25 SOLUTION RESPIRATORY (INHALATION) EVERY 8 HOURS PRN
Status: DISCONTINUED | OUTPATIENT
Start: 2023-05-04 | End: 2023-05-04

## 2023-05-04 RX ORDER — GUAIFENESIN 600 MG/1
600 TABLET, EXTENDED RELEASE ORAL EVERY 12 HOURS SCHEDULED
Status: DISCONTINUED | OUTPATIENT
Start: 2023-05-04 | End: 2023-05-06 | Stop reason: HOSPADM

## 2023-05-04 RX ORDER — ALBUTEROL SULFATE 2.5 MG/3ML
2.5 SOLUTION RESPIRATORY (INHALATION) EVERY 6 HOURS PRN
Status: DISCONTINUED | OUTPATIENT
Start: 2023-05-04 | End: 2023-05-06 | Stop reason: HOSPADM

## 2023-05-04 RX ADMIN — AMLODIPINE BESYLATE 5 MG: 5 TABLET ORAL at 08:41

## 2023-05-04 RX ADMIN — BUDESONIDE AND FORMOTEROL FUMARATE DIHYDRATE 2 PUFF: 80; 4.5 AEROSOL RESPIRATORY (INHALATION) at 08:42

## 2023-05-04 RX ADMIN — PANTOPRAZOLE SODIUM 40 MG: 40 TABLET, DELAYED RELEASE ORAL at 05:40

## 2023-05-04 RX ADMIN — ALBUTEROL SULFATE 2 PUFF: 90 AEROSOL, METERED RESPIRATORY (INHALATION) at 11:34

## 2023-05-04 RX ADMIN — GUAIFENESIN 600 MG: 600 TABLET, EXTENDED RELEASE ORAL at 22:16

## 2023-05-04 RX ADMIN — METRONIDAZOLE 500 MG: 500 TABLET ORAL at 22:16

## 2023-05-04 RX ADMIN — CHLORTHALIDONE 25 MG: 25 TABLET ORAL at 08:42

## 2023-05-04 RX ADMIN — GUAIFENESIN 600 MG: 600 TABLET, EXTENDED RELEASE ORAL at 14:46

## 2023-05-04 RX ADMIN — NICOTINE 1 PATCH: 21 PATCH, EXTENDED RELEASE TRANSDERMAL at 08:42

## 2023-05-04 RX ADMIN — HYDROMORPHONE HYDROCHLORIDE 0.5 MG: 1 INJECTION, SOLUTION INTRAMUSCULAR; INTRAVENOUS; SUBCUTANEOUS at 11:24

## 2023-05-04 RX ADMIN — METRONIDAZOLE 500 MG: 500 TABLET ORAL at 05:40

## 2023-05-04 RX ADMIN — BUDESONIDE AND FORMOTEROL FUMARATE DIHYDRATE 2 PUFF: 80; 4.5 AEROSOL RESPIRATORY (INHALATION) at 17:24

## 2023-05-04 RX ADMIN — LORATADINE 10 MG: 10 TABLET ORAL at 08:42

## 2023-05-04 RX ADMIN — METRONIDAZOLE 500 MG: 500 TABLET ORAL at 14:46

## 2023-05-04 RX ADMIN — CIPROFLOXACIN HYDROCHLORIDE 500 MG: 250 TABLET, FILM COATED ORAL at 22:17

## 2023-05-04 RX ADMIN — LEVALBUTEROL HYDROCHLORIDE 1.25 MG: 1.25 SOLUTION RESPIRATORY (INHALATION) at 13:40

## 2023-05-04 RX ADMIN — CIPROFLOXACIN HYDROCHLORIDE 500 MG: 250 TABLET, FILM COATED ORAL at 08:42

## 2023-05-04 RX ADMIN — LORATADINE 10 MG: 10 TABLET ORAL at 22:17

## 2023-05-04 RX ADMIN — OXYCODONE HYDROCHLORIDE 10 MG: 5 TABLET ORAL at 08:42

## 2023-05-04 RX ADMIN — HYDROMORPHONE HYDROCHLORIDE 0.5 MG: 1 INJECTION, SOLUTION INTRAMUSCULAR; INTRAVENOUS; SUBCUTANEOUS at 20:56

## 2023-05-04 RX ADMIN — OXYCODONE HYDROCHLORIDE 10 MG: 5 TABLET ORAL at 22:49

## 2023-05-04 RX ADMIN — IPRATROPIUM BROMIDE 0.5 MG: 0.5 SOLUTION RESPIRATORY (INHALATION) at 13:40

## 2023-05-04 RX ADMIN — OXYCODONE HYDROCHLORIDE 10 MG: 5 TABLET ORAL at 14:48

## 2023-05-04 RX ADMIN — ENOXAPARIN SODIUM 40 MG: 40 INJECTION SUBCUTANEOUS at 08:41

## 2023-05-04 NOTE — PLAN OF CARE
Problem: Prexisting or High Potential for Compromised Skin Integrity  Goal: Skin integrity is maintained or improved  Description: INTERVENTIONS:  - Identify patients at risk for skin breakdown  - Assess and monitor skin integrity  - Assess and monitor nutrition and hydration status  - Monitor labs   - Assess for incontinence   - Turn and reposition patient  - Assist with mobility/ambulation  - Relieve pressure over bony prominences  - Avoid friction and shearing  - Provide appropriate hygiene as needed including keeping skin clean and dry  - Evaluate need for skin moisturizer/barrier cream  - Collaborate with interdisciplinary team   - Patient/family teaching  - Consider wound care consult   Outcome: Progressing     Problem: SKIN/TISSUE INTEGRITY - ADULT  Goal: Incision(s), wounds(s) or drain site(s) healing without S/S of infection  Description: INTERVENTIONS  - Assess and document dressing, incision, wound bed, drain sites and surrounding tissue  - Provide patient and family education  - Perform skin care/dressing changes every  Outcome: Progressing     Problem: PAIN - ADULT  Goal: Verbalizes/displays adequate comfort level or baseline comfort level  Description: Interventions:  - Encourage patient to monitor pain and request assistance  - Assess pain using appropriate pain scale  - Administer analgesics based on type and severity of pain and evaluate response  - Implement non-pharmacological measures as appropriate and evaluate response  - Consider cultural and social influences on pain and pain management  - Notify physician/advanced practitioner if interventions unsuccessful or patient reports new pain  Outcome: Progressing     Problem: INFECTION - ADULT  Goal: Absence or prevention of progression during hospitalization  Description: INTERVENTIONS:  - Assess and monitor for signs and symptoms of infection  - Monitor lab/diagnostic results  - Monitor all insertion sites, i e  indwelling lines, tubes, and drains  - Monitor endotracheal if appropriate and nasal secretions for changes in amount and color  - [place] appropriate cooling/warming therapies per order  - Administer medications as ordered  - Instruct and encourage patient and family to use good hand hygiene technique  - Identify and instruct in appropriate isolation precautions for identified infection/condition  Outcome: Progressing

## 2023-05-04 NOTE — PROGRESS NOTES
Internal medicine and wound care help profoundly appreciated  Wounds examined  Covered with foam per wound care  Tolerating her diet  Bowels moving  Continue care as per internal medicine and wound care  We will follow with you

## 2023-05-04 NOTE — ASSESSMENT & PLAN NOTE
· Improving  · Patient was significantly hypertensive during hospital course  · We will initiate amlodipine 5 mg daily and chlorthalidone 25 mg daily  · Hydralazine 5 mg IV every 6 hours as needed for SBP greater than 160 mmHg  · Monitor blood pressures

## 2023-05-04 NOTE — WOUND OSTOMY CARE
Consult Note - Wound   Giovanny Blake 62 y o  female MRN: [id number]  Unit/Bed#: 7T [facility] 710-01 Encounter: [id number]        History and Present Illness:  Patient is 63 yo female admitted to [facility] with postoperative complication of skin involving drainage from surgical wound  Patient has history of GERD, psoriatic arthritis, HTN, and smoking  Patient is continent of bowel and bladder  Patient is mod assist with turning from side to side for assessment  Patient is independent with meals  Assessment Findings:     1  Nonhealing abdominal wound- two full thickness wounds on midline abdomen  Proximal wound tunnels at 12 o clock by 4 cm with pale pink and yellow tissue  Distal wound is pale pink and probes 3 4 cm  Wounds packed with mesalt and placed silver alginate over top to control thick, yellow moderate serous drainage  Periwound skin is excoriated from drainage, applied skin prep today  Covered with Allevyn foam      Sacral/buttocks and B/L heels intact and blanching     No induration, fluctuance, odor, warmth/temperature differences, redness, or purulence noted to the above noted wounds and skin areas assessed  Patient tolerated well- no s/s of non-verbal pain or discomfort observed during the encounter  Bedside nurse aware of plan of care  See flow sheets for more detailed assessment findings  Wound care will continue to follow  Skin care plans:  1-Hydraguard to bilateral sacrum, buttock and heels BID and PRN  2-Elevate heels to offload pressure  3-Ehob cushion in chair when out of bed  4-Moisturize skin daily with skin nourishing cream   5-Turn/reposition q2h or when medically stable for pressure re-distribution on skin  6-Cleanse abdominal wounds with normal saline, gently pack mesalt packing and secure tail with tape, place silver alginate over wound bed, cover with Allevyn foam  Change every other day and PRN soilage/displacement        Wounds:  Wound 04/20/23 Abdomen N/A (Active)   Wound Image 05/04/23 1029   Wound Description Fragile;Pink;Yellow 05/04/23 1029   Yelena-wound Assessment Pink;Scar Tissue 05/04/23 1029   Wound Length (cm) 2 cm 05/04/23 1029   Wound Width (cm) 19 cm 05/04/23 1029   Wound Depth (cm) 3 4 cm 05/04/23 1029   Wound Surface Area (cm^2) 38 cm^2 05/04/23 1029   Wound Volume (cm^3) 129 2 cm^3 05/04/23 1029   Calculated Wound Volume (cm^3) 129 2 cm^3 05/04/23 1029   Tunneling 4 cm 05/04/23 1029   Tunneling in depth located at 12 05/04/23 1029   Undermining 0 05/04/23 1029   Undermining is depth extending from 0 05/04/23 1029   Wound Site Closure EMERITA 05/04/23 1029   Drainage Amount Moderate 05/04/23 1029   Drainage Description Yellow;Serous 05/04/23 1029   Non-staged Wound Description Full thickness 05/04/23 1029   Treatments Cleansed 05/04/23 1029   Dressing Calcium Alginate with Silver; Foam, Silicon (eg  Allevyn, etc); Mesalt 05/04/23 1029   Wound packed? No 05/04/23 1029   Packing- # removed 0 05/04/23 1029   Packing- # inserted 0 05/04/23 1029   Dressing Changed New 05/04/23 1029   Patient Tolerance Tolerated well 05/04/23 1029   Dressing Status Clean;Dry; Intact 05/04/23 1029                             Patricia PATELN, RN, [facility]

## 2023-05-04 NOTE — RESPIRATORY THERAPY NOTE
Resp care   05/04/23 1340   Respiratory Protocol   Protocol Initiated? Yes   Protocol Selection Respiratory   Language Barrier? No   Medical & Social History Reviewed? Yes   Diagnostic Studies Reviewed? Yes   Physical Assessment Performed? Yes   Respiratory Plan Home Bronchodilator Patient pathway   Respiratory Assessment   Assessment Type Assess only   General Appearance Alert; Awake   Respiratory Pattern Normal   Chest Assessment Chest expansion symmetrical   Bilateral Breath Sounds Expiratory wheezes   Cough Non-productive;Strong;Moist   Resp Comments Pt assesed per respiratory protocol  Pt admitted for wound drainge post operative  pt with hxm of copd and asthma  pt c/o cough that is causing wheezes  Cough is moist non productive at this time  RN reports cough was dry this am  B/S scattered exp wheezes with good airflow  Xopenex/Atrovent nebulizer given with no change  Pt instructed on Acapella  Pt with good technique and strong cough noted in which pt states her breathing improved  Will keep Nebs prn for now and D/C Atrovent due to secretions  Will also reccommend Mucinex be ordered  Pt aware of plan

## 2023-05-04 NOTE — PROGRESS NOTES
[facility]  Progress Note  Name: Sukhdev Pedro  MRN: [id number]  Unit/Bed#: 7T [facility] 710-01 I Date of Admission: 5/2/2023   Date of Service: 5/4/2023 I Hospital Day: 1    Assessment/Plan   * Postoperative complication of skin involving drainage from surgical wound  Assessment & Plan  · Likely secondary to prior open gastric bypass and multiple other abdominal surgeries  · As evidenced by recurrent suture granulomas with persistent drainage and erythema  · Abdominal wound has developed suture granuloma which patient states is painful and draining fluid  · Presents from Dr Baltazar Schuler office  · Patient is nonseptic appearing  · Afebrile and without leukocytosis  · Initiate ciprofloxacin and Flagyl for now ; de-escalate antibiotics as appropriate ; patient has severe penicillin allergy  · Trend fever curve and WBC count  · General Surgery consultation ; appreciate recommendations  · Inpatient consult to wound care nurse ; will likely need wound care nurse at home    Gastroesophageal reflux disease without esophagitis  Assessment & Plan  · Continue home PPI    Primary hypertension  Assessment & Plan  · Improving  · Patient was significantly hypertensive during hospital course  · We will initiate amlodipine 5 mg daily and chlorthalidone 25 mg daily  · Hydralazine 5 mg IV every 6 hours as needed for SBP greater than 160 mmHg  · Monitor blood pressures    Psoriatic arthritis ([facility] [address] )  Assessment & Plan  · Outpatient follow-up with Rheumatology    P[facility] emph[facility]sema ([facility] )  Assessment & Plan  · Not in acute exacerbation  · Continue home albuterol and Symbicort  · Monitor respiratory status    Morbid (severe) obesity due to excess calories ([facility] [address] )  Assessment & Plan  · Present on admission as evidenced by BMI of 49  · Counseled on weight loss and diet/lifestyle modifications    Tobacco dependence  Assessment & Plan  · Nicotine patch while inpatient       VTE Pharmacologic Prophylaxis: Lovenox    Patient Centered Rounds:  Patient care rounds were performed with nursing    Discussions with Specialists or Other Care Team Provider: General Surgery    Education and Discussions with Family / Patient: Spoke with patient at bedside    Time Spent for Care: 30  More than 50% of total time spent on counseling and coordination of care as described above  Current Length of Stay: 1 day(s)    Current Patient Status: Inpatient     Certification Statement: The patient will continue to require additional inpatient hospital stay due to postsurgical complication with open wounds    Discharge Plan: Home with home health care    Code Status: Level 1 - Full Code      Subjective:   Patient seen and examined at bedside  No acute events overnight  Endorses mild abdominal pain  Very nonseptic appearing  Objective:     Vitals:   Temp (24hrs), Av 1 °F (36 2 °C), Min:96 2 °F (35 7 °C), Max:98 2 °F (36 8 °C)    Temp:  [96 2 °F (35 7 °C)-98 2 °F (36 8 °C)] 96 8 °F (36 °C)  HR:  [63-75] 69  Resp:  [16-18] 17  BP: (140-184)/() 156/99  SpO2:  [92 %-95 %] 92 %  Body mass index is 50 kg/m²  Input and Output Summary (last 24 hours): Intake/Output Summary (Last 24 hours) at 2023 0818  Last data filed at 5/3/2023 1723  Gross per 24 hour   Intake 840 ml   Output --   Net 840 ml       Physical Exam:     Physical Exam  Vitals and nursing note reviewed  Constitutional:       General: She is not in acute distress  Appearance: She is well-developed  She is obese  HENT:      Head: Normocephalic and atraumatic  Eyes:      Conjunctiva/sclera: Conjunctivae normal    Cardiovascular:      Rate and Rhythm: Normal rate and regular rhythm  Heart sounds: No murmur heard  Pulmonary:      Effort: Pulmonary effort is normal  No respiratory distress  Breath sounds: Normal breath sounds  Abdominal:      Palpations: Abdomen is soft  Tenderness: There is no abdominal tenderness     Musculoskeletal: General: No swelling  Cervical back: Neck supple  Skin:     General: Skin is warm and dry  Capillary Refill: Capillary refill takes less than 2 seconds  Neurological:      Mental Status: She is alert  Psychiatric:         Mood and Affect: Mood normal          Additional Data:     Labs: I have reviewed pertinent results     Results from last 7 days   Lab Units 05/04/23  0511   WBC Thousand/uL 2 94*   HEMOGLOBIN g/dL 12 4   HEMATOCRIT % 39 2   PLATELETS Thousands/uL 167   NEUTROS PCT % 46   LYMPHS PCT % 36   MONOS PCT % 14*   EOS PCT % 3     Results from last 7 days   Lab Units 05/04/23  0511 05/03/23  0456 05/02/23  1602   SODIUM mmol/L 136   < > 137   POTASSIUM mmol/L 3 9   < > 4 2   CHLORIDE mmol/L 97   < > 97   CO2 mmol/L 32   < > 31   BUN mg/dL 9   < > 12   CREATININE mg/dL 0 57*   < > 0 56*   ANION GAP mmol/L 7   < > 9   CALCIUM mg/dL 8 8   < > 8 7   ALBUMIN g/dL  --   --  3 6   TOTAL BILIRUBIN mg/dL  --   --  0 77   ALK PHOS U/L  --   --  97   ALT U/L  --   --  13   AST U/L  --   --  33   GLUCOSE RANDOM mg/dL 93   < > 98    < > = values in this interval not displayed  Imaging: I have reviewed pertinent imaging       Recent Cultures (last 7 days):     Results from last 7 days   Lab Units 05/02/23  1602   BLOOD CULTURE  Received in Microbiology Lab  Culture in Progress  Received in Microbiology Lab  Culture in Progress     GRAM STAIN RESULT  1+ Polys  No bacteria seen   WOUND CULTURE  1+ Growth of Staphylococcus aureus*       Last 24 Hours Medication List:   Current Facility-Administered Medications   Medication Dose Route Frequency Provider Last Rate   • acetaminophen  650 mg Oral Q4H PRN Rosalind Andrade, DO     • albuterol  2 puff Inhalation Q6H PRN Rosalind Andrade, DO     • amLODIPine  5 mg Oral Daily Rosalind Andrade, DO     • budesonide-formoterol  2 puff Inhalation BID Rosalind Andrade, DO     • chlorthalidone  25 mg Oral Daily Rosalind Andrade, DO     • ciprofloxacin  500 mg Oral Q12H [address] Mari Jacobo, DO     • enoxaparin  40 mg Subcutaneous Daily Mari Jacobo, DO     • hydrALAZINE  5 mg Intravenous Q6H PRN Mari Jacobo, DO     • HYDROmorphone  0 5 mg Intravenous Q6H PRN Mari Jacobo, DO     • hydrOXYzine HCL  25 mg Oral HS Mari Jacobo, DO     • loratadine  10 mg Oral BID Mari Jacobo, DO     • metroNIDAZOLE  500 mg Oral Q8H [address] Mari Jacobo, DO     • nicotine  1 patch Transdermal Daily Mari Jacobo, DO     • ondansetron  4 mg Intravenous Q6H PRN Mari Jacobo, DO     • oxyCODONE  10 mg Oral Q4H PRN Mari Jacobo, DO     • oxyCODONE  5 mg Oral Q4H PRN Mari Jacobo, DO     • pantoprazole  40 mg Oral Early Morning Mari Jacobo, DO          Today, Patient Was Seen By: Mari Centeno DO    ** Please Note: Dictation voice to text software may have been used in the creation of this document   **

## 2023-05-04 NOTE — DISCHARGE INSTR - OTHER ORDERS
Skin care plans:  1-Hydraguard to bilateral sacrum, buttock and heels BID and PRN  2-Elevate heels to offload pressure  3-Ehob cushion in chair when out of bed  4-Moisturize skin daily with skin nourishing cream   5-Turn/reposition q2h or when medically stable for pressure re-distribution on skin  6-Cleanse abdominal wounds with normal saline, gently pack mesalt packing and secure tail with tape, place silver alginate over wound bed, cover with Allevyn foam  Change every other day and PRN soilage/displacement

## 2023-05-04 NOTE — ASSESSMENT & PLAN NOTE
· Likely secondary to prior open gastric bypass and multiple other abdominal surgeries  · As evidenced by recurrent suture granulomas with persistent drainage and erythema  · Abdominal wound has developed suture granuloma which patient states is painful and draining fluid  · Presents from Dr Sherry Coles office  · Patient is nonseptic appearing  · Afebrile and without leukocytosis  · Discontinue antibiotics  · Trend fever curve and WBC count  · General Surgery consultation ; appreciate recommendations  · Inpatient consult to wound care nurse ; will likely need wound care nurse at home

## 2023-05-04 NOTE — PLAN OF CARE
Problem: Prexisting or High Potential for Compromised Skin Integrity  Goal: Skin integrity is maintained or improved  Description: INTERVENTIONS:  - Identify patients at risk for skin breakdown  - Assess and monitor skin integrity  - Assess and monitor nutrition and hydration status  - Monitor labs   - Assess for incontinence   - Turn and reposition patient  - Assist with mobility/ambulation  - Relieve pressure over bony prominences  - Avoid friction and shearing  - Provide appropriate hygiene as needed including keeping skin clean and dry  - Evaluate need for skin moisturizer/barrier cream  - Collaborate with interdisciplinary team   - Patient/family teaching  - Consider wound care consult   Outcome: Progressing     Problem: SKIN/TISSUE INTEGRITY - ADULT  Goal: Incision(s), wounds(s) or drain site(s) healing without S/S of infection  Description: INTERVENTIONS  - Assess and document dressing, incision, wound bed, drain sites and surrounding tissue  - Provide patient and family education  Outcome: Progressing

## 2023-05-05 LAB
ANION GAP SERPL CALCULATED.3IONS-SCNC: 9 MMOL/L (ref 4–13)
BASOPHILS # BLD AUTO: 0.02 THOUSANDS/ÂΜL (ref 0–0.1)
BASOPHILS NFR BLD AUTO: 1 % (ref 0–1)
BUN SERPL-MCNC: 11 MG/DL (ref 5–25)
CALCIUM SERPL-MCNC: 8.7 MG/DL (ref 8.4–10.2)
CHLORIDE SERPL-SCNC: 96 MMOL/L (ref 96–108)
CO2 SERPL-SCNC: 30 MMOL/L (ref 21–32)
CREAT SERPL-MCNC: 0.51 MG/DL (ref 0.6–1.3)
EOSINOPHIL # BLD AUTO: 0.13 THOUSAND/ÂΜL (ref 0–0.61)
EOSINOPHIL NFR BLD AUTO: 4 % (ref 0–6)
ERYTHROCYTE [DISTWIDTH] IN BLOOD BY AUTOMATED COUNT: 17.2 % (ref 11.6–15.1)
GFR SERPL CREATININE-BSD FRML MDRD: 107 ML/MIN/1.73SQ M
GLUCOSE SERPL-MCNC: 90 MG/DL (ref 65–140)
HCT VFR BLD AUTO: 38.8 % (ref 34.8–46.1)
HGB BLD-MCNC: 11.7 G/DL (ref 11.5–15.4)
IMM GRANULOCYTES # BLD AUTO: 0 THOUSAND/UL (ref 0–0.2)
IMM GRANULOCYTES NFR BLD AUTO: 0 % (ref 0–2)
LYMPHOCYTES # BLD AUTO: 1.08 THOUSANDS/ÂΜL (ref 0.6–4.47)
LYMPHOCYTES NFR BLD AUTO: 30 % (ref 14–44)
MAGNESIUM SERPL-MCNC: 2.3 MG/DL (ref 1.9–2.7)
MCH RBC QN AUTO: 29.1 PG (ref 26.8–34.3)
MCHC RBC AUTO-ENTMCNC: 30.2 G/DL (ref 31.4–37.4)
MCV RBC AUTO: 97 FL (ref 82–98)
MONOCYTES # BLD AUTO: 0.5 THOUSAND/ÂΜL (ref 0.17–1.22)
MONOCYTES NFR BLD AUTO: 14 % (ref 4–12)
NEUTROPHILS # BLD AUTO: 1.85 THOUSANDS/ÂΜL (ref 1.85–7.62)
NEUTS SEG NFR BLD AUTO: 51 % (ref 43–75)
NRBC BLD AUTO-RTO: 0 /100 WBCS
PHOSPHATE SERPL-MCNC: 4.3 MG/DL (ref 2.7–4.5)
PLATELET # BLD AUTO: 154 THOUSANDS/UL (ref 149–390)
PMV BLD AUTO: 11.3 FL (ref 8.9–12.7)
POTASSIUM SERPL-SCNC: 4.6 MMOL/L (ref 3.5–5.3)
RBC # BLD AUTO: 4.02 MILLION/UL (ref 3.81–5.12)
SODIUM SERPL-SCNC: 135 MMOL/L (ref 135–147)
WBC # BLD AUTO: 3.58 THOUSAND/UL (ref 4.31–10.16)

## 2023-05-05 RX ORDER — NYSTATIN 100000 [USP'U]/G
POWDER TOPICAL 2 TIMES DAILY
Status: DISCONTINUED | OUTPATIENT
Start: 2023-05-05 | End: 2023-05-06 | Stop reason: HOSPADM

## 2023-05-05 RX ADMIN — OXYCODONE HYDROCHLORIDE 10 MG: 5 TABLET ORAL at 13:56

## 2023-05-05 RX ADMIN — BUDESONIDE AND FORMOTEROL FUMARATE DIHYDRATE 2 PUFF: 80; 4.5 AEROSOL RESPIRATORY (INHALATION) at 17:44

## 2023-05-05 RX ADMIN — HYDROMORPHONE HYDROCHLORIDE 0.5 MG: 1 INJECTION, SOLUTION INTRAMUSCULAR; INTRAVENOUS; SUBCUTANEOUS at 08:43

## 2023-05-05 RX ADMIN — AMLODIPINE BESYLATE 5 MG: 5 TABLET ORAL at 08:38

## 2023-05-05 RX ADMIN — LORATADINE 10 MG: 10 TABLET ORAL at 21:13

## 2023-05-05 RX ADMIN — ONDANSETRON 4 MG: 2 INJECTION INTRAMUSCULAR; INTRAVENOUS at 08:43

## 2023-05-05 RX ADMIN — OXYCODONE HYDROCHLORIDE 10 MG: 5 TABLET ORAL at 05:02

## 2023-05-05 RX ADMIN — NYSTATIN: 100000 POWDER TOPICAL at 10:45

## 2023-05-05 RX ADMIN — GUAIFENESIN 600 MG: 600 TABLET, EXTENDED RELEASE ORAL at 21:14

## 2023-05-05 RX ADMIN — CIPROFLOXACIN HYDROCHLORIDE 500 MG: 250 TABLET, FILM COATED ORAL at 08:38

## 2023-05-05 RX ADMIN — ENOXAPARIN SODIUM 40 MG: 40 INJECTION SUBCUTANEOUS at 08:38

## 2023-05-05 RX ADMIN — LORATADINE 10 MG: 10 TABLET ORAL at 08:38

## 2023-05-05 RX ADMIN — HYDROXYZINE HYDROCHLORIDE 25 MG: 25 TABLET ORAL at 23:19

## 2023-05-05 RX ADMIN — GUAIFENESIN 600 MG: 600 TABLET, EXTENDED RELEASE ORAL at 08:38

## 2023-05-05 RX ADMIN — PANTOPRAZOLE SODIUM 40 MG: 40 TABLET, DELAYED RELEASE ORAL at 05:02

## 2023-05-05 RX ADMIN — NYSTATIN: 100000 POWDER TOPICAL at 17:44

## 2023-05-05 RX ADMIN — METRONIDAZOLE 500 MG: 500 TABLET ORAL at 05:02

## 2023-05-05 RX ADMIN — BUDESONIDE AND FORMOTEROL FUMARATE DIHYDRATE 2 PUFF: 80; 4.5 AEROSOL RESPIRATORY (INHALATION) at 08:42

## 2023-05-05 RX ADMIN — OXYCODONE HYDROCHLORIDE 10 MG: 5 TABLET ORAL at 23:30

## 2023-05-05 RX ADMIN — OXYCODONE HYDROCHLORIDE 10 MG: 5 TABLET ORAL at 19:04

## 2023-05-05 RX ADMIN — CHLORTHALIDONE 25 MG: 25 TABLET ORAL at 08:38

## 2023-05-05 NOTE — PLAN OF CARE
Problem: Prexisting or High Potential for Compromised Skin Integrity  Goal: Skin integrity is maintained or improved  Description: INTERVENTIONS:  - Identify patients at risk for skin breakdown  - Assess and monitor skin integrity  - Assess and monitor nutrition and hydration status  - Monitor labs   - Assess for incontinence   - Turn and reposition patient  - Assist with mobility/ambulation  - Relieve pressure over bony prominences  - Avoid friction and shearing  - Provide appropriate hygiene as needed including keeping skin clean and dry  - Evaluate need for skin moisturizer/barrier cream  - Collaborate with interdisciplinary team   - Patient/family teaching  - Consider wound care consult   Outcome: Progressing     Problem: PAIN - ADULT  Goal: Verbalizes/displays adequate comfort level or baseline comfort level  Description: Interventions:  - Encourage patient to monitor pain and request assistance  - Assess pain using appropriate pain scale  - Administer analgesics based on type and severity of pain and evaluate response  - Implement non-pharmacological measures as appropriate and evaluate response  - Consider cultural and social influences on pain and pain management  - Notify physician/advanced practitioner if interventions unsuccessful or patient reports new pain  Outcome: Progressing     Problem: INFECTION - ADULT  Goal: Absence or prevention of progression during hospitalization  Description: INTERVENTIONS:  - Assess and monitor for signs and symptoms of infection  - Monitor lab/diagnostic results  - Monitor all insertion sites, i e  indwelling lines, tubes, and drains  - Monitor endotracheal if appropriate and nasal secretions for changes in amount and color  - [place] appropriate cooling/warming therapies per order  - Administer medications as ordered  - Instruct and encourage patient and family to use good hand hygiene technique  - Identify and instruct in appropriate isolation precautions for identified infection/condition  Outcome: Progressing     Problem: SKIN/TISSUE INTEGRITY - ADULT  Goal: Incision(s), wounds(s) or drain site(s) healing without S/S of infection  Description: INTERVENTIONS  - Assess and document dressing, incision, wound bed, drain sites and surrounding tissue  - Provide patient and family education  - Perform skin care/dressing changes every   Outcome: Progressing

## 2023-05-05 NOTE — PROGRESS NOTES
[facility]  Progress Note  Name: Enio More  MRN: [id number]  Unit/Bed#: 7T [facility] 710-01 I Date of Admission: 5/2/2023   Date of Service: 5/5/2023 I Hospital Day: 2    Assessment/Plan   * Postoperative complication of skin involving drainage from surgical wound  Assessment & Plan  · Likely secondary to prior open gastric bypass and multiple other abdominal surgeries  · As evidenced by recurrent suture granulomas with persistent drainage and erythema  · Abdominal wound has developed suture granuloma which patient states is painful and draining fluid  · Presents from Dr Morton [place] office  · Patient is nonseptic appearing  · Afebrile and without leukocytosis  · Discontinue antibiotics  · Trend fever curve and WBC count  · General Surgery consultation ; appreciate recommendations  · Inpatient consult to wound care nurse ; will likely need wound care nurse at home    Gastroesophageal reflux disease without esophagitis  Assessment & Plan  · Continue home PPI    Primary hypertension  Assessment & Plan  · Improving  · Patient was significantly hypertensive during hospital course  · We will initiate amlodipine 5 mg daily and chlorthalidone 25 mg daily  · Hydralazine 5 mg IV every 6 hours as needed for SBP greater than 160 mmHg  · Monitor blood pressures    Psoriatic arthritis ([facility] [address] )  Assessment & Plan  · Outpatient follow-up with Rheumatology    Pulmonary emphysema ([facility] [address] )  Assessment & Plan  · Not in acute exacerbation  · Continue home albuterol and Symbicort  · Monitor respiratory status    Morbid (severe) obesity due to excess calories ([address] )  Assessment & Plan  · Present on admission as evidenced by BMI of 49  · Counseled on weight loss and diet/lifestyle modifications    Tobacco dependence  Assessment & Plan  · Nicotine patch while inpatient       VTE Pharmacologic Prophylaxis: Lovenox    Patient Centered Rounds:  Patient care rounds were performed with nursing    Discussions with Specialists or Other Care Team Provider: General Surgery    Education and Discussions with Family / Patient: Spoke with patient at bedside    Time Spent for Care: 30  More than 50% of total time spent on counseling and coordination of care as described above  Current Length of Stay: 2 day(s)    Current Patient Status: Inpatient     Certification Statement: The patient will continue to require additional inpatient hospital stay due to postsurgical complication with open wounds    Discharge Plan: Home with home health care    Code Status: Level 1 - Full Code      Subjective:   Patient seen and examined at bedside  No acute events overnight  Endorses mild abdominal pain  Very nonseptic appearing  Objective:     Vitals:   Temp (24hrs), Av 9 °F (36 1 °C), Min:96 8 °F (36 °C), Max:97 1 °F (36 2 °C)    Temp:  [96 8 °F (36 °C)-97 1 °F (36 2 °C)] 97 1 °F (36 2 °C)  HR:  [60-73] 60  Resp:  [16-18] 18  BP: (152-168)/(79-96) 168/96  SpO2:  [94 %-98 %] 95 %  Body mass index is 50 kg/m²  Input and Output Summary (last 24 hours): Intake/Output Summary (Last 24 hours) at 2023 0918  Last data filed at 2023 1700  Gross per 24 hour   Intake 360 ml   Output --   Net 360 ml       Physical Exam:     Physical Exam  Vitals and nursing note reviewed  Constitutional:       General: She is not in acute distress  Appearance: She is well-developed  She is obese  HENT:      Head: Normocephalic and atraumatic  Eyes:      Conjunctiva/sclera: Conjunctivae normal    Cardiovascular:      Rate and Rhythm: Normal rate and regular rhythm  Heart sounds: No murmur heard  Pulmonary:      Effort: Pulmonary effort is normal  No respiratory distress  Breath sounds: Normal breath sounds  Abdominal:      Palpations: Abdomen is soft  Tenderness: There is no abdominal tenderness  Musculoskeletal:         General: No swelling  Cervical back: Neck supple  Skin:     General: Skin is warm and dry  Capillary Refill: Capillary refill takes less than 2 seconds  Neurological:      Mental Status: She is alert  Psychiatric:         Mood and Affect: Mood normal          Additional Data:     Labs: I have reviewed pertinent results     Results from last 7 days   Lab Units 05/05/23  0459   WBC Thousand/uL 3 58*   HEMOGLOBIN g/dL 11 7   HEMATOCRIT % 38 8   PLATELETS Thousands/uL 154   NEUTROS PCT % 51   LYMPHS PCT % 30   MONOS PCT % 14*   EOS PCT % 4     Results from last 7 days   Lab Units 05/05/23  0459 05/03/23  0456 05/02/23  1602   SODIUM mmol/L 135   < > 137   POTASSIUM mmol/L 4 6   < > 4 2   CHLORIDE mmol/L 96   < > 97   CO2 mmol/L 30   < > 31   BUN mg/dL 11   < > 12   CREATININE mg/dL 0 51*   < > 0 56*   ANION GAP mmol/L 9   < > 9   CALCIUM mg/dL 8 7   < > 8 7   ALBUMIN g/dL  --   --  3 6   TOTAL BILIRUBIN mg/dL  --   --  0 77   ALK PHOS U/L  --   --  97   ALT U/L  --   --  13   AST U/L  --   --  33   GLUCOSE RANDOM mg/dL 90   < > 98    < > = values in this interval not displayed  Imaging: I have reviewed pertinent imaging       Recent Cultures (last 7 days):     Results from last 7 days   Lab Units 05/02/23  1602   BLOOD CULTURE  No Growth at 24 hrs  No Growth at 24 hrs     GRAM STAIN RESULT  1+ Polys  No bacteria seen   WOUND CULTURE  1+ Growth of Staphylococcus aureus*       Last 24 Hours Medication List:   Current Facility-Administered Medications   Medication Dose Route Frequency Provider Last Rate   • acetaminophen  650 mg Oral Q4H PRN Federico León, DO     • albuterol  2 puff Inhalation Q6H PRN [place] Mau, DO     • albuterol  2 5 mg Nebulization Q6H PRN [place] Mau, DO     • amLODIPine  5 mg Oral Daily [place] Mau, DO     • budesonide-formoterol  2 puff Inhalation BID Federico Mau, DO     • chlorthalidone  25 mg Oral Daily Federico Mau, DO     • enoxaparin  40 mg Subcutaneous Daily [place] Mau, DO     • guaiFENesin  600 mg Oral Q12H [address] Yancy Kramer Lorna,      • hydrALAZINE  5 mg Intravenous Q6H PRN Yuan Rocha DO     • HYDROmorphone  0 5 mg Intravenous Q6H PRN Yuan Rocha DO     • hydrOXYzine HCL  25 mg Oral HS Yuan Rocha DO     • loratadine  10 mg Oral BID Yuan Rocha DO     • nicotine  1 patch Transdermal Daily Yuan Rocha, DO     • ondansetron  4 mg Intravenous Q6H PRN Yuan Rocha DO     • oxyCODONE  10 mg Oral Q4H PRN Yuan Rocha DO     • oxyCODONE  5 mg Oral Q4H PRN Yuan Rocha DO     • pantoprazole  40 mg Oral Early Morning Yuan Rocha DO          Today, Patient Was Seen By: uYan Rocha DO    ** Please Note: Dictation voice to text software may have been used in the creation of this document   **

## 2023-05-05 NOTE — PLAN OF CARE
Problem: Potential for Falls  Goal: Patient will remain free of falls  Description: INTERVENTIONS:  - Educate patient/family on patient safety including physical limitations  - Instruct patient to call for assistance with activity   - Consult OT/PT to assist with strengthening/mobility   - Keep Call bell within reach  - Keep bed low and locked with side rails adjusted as appropriate  - Keep care items and personal belongings within reach  - Initiate and maintain comfort rounds  - Make Fall Risk Sign visible to staff  - Offer Toileting every 2 Hours, in advance of need  - Apply yellow socks and bracelet for high fall risk patients  - Consider moving patient to room near nurses station  Outcome: Progressing     Problem: Prexisting or High Potential for Compromised Skin Integrity  Goal: Skin integrity is maintained or improved  Description: INTERVENTIONS:  - Identify patients at risk for skin breakdown  - Assess and monitor skin integrity  - Assess and monitor nutrition and hydration status  - Monitor labs   - Assess for incontinence   - Turn and reposition patient  - Assist with mobility/ambulation  - Relieve pressure over bony prominences  - Avoid friction and shearing  - Provide appropriate hygiene as needed including keeping skin clean and dry  - Evaluate need for skin moisturizer/barrier cream  - Collaborate with interdisciplinary team   - Patient/family teaching  - Consider wound care consult   Outcome: Progressing     Problem: PAIN - ADULT  Goal: Verbalizes/displays adequate comfort level or baseline comfort level  Description: Interventions:  - Encourage patient to monitor pain and request assistance  - Assess pain using appropriate pain scale  - Administer analgesics based on type and severity of pain and evaluate response  - Implement non-pharmacological measures as appropriate and evaluate response  - Consider cultural and social influences on pain and pain management  - Notify physician/advanced practitioner if interventions unsuccessful or patient reports new pain  Outcome: Progressing     Problem: INFECTION - ADULT  Goal: Absence or prevention of progression during hospitalization  Description: INTERVENTIONS:  - Assess and monitor for signs and symptoms of infection  - Monitor lab/diagnostic results  - Monitor all insertion sites, i e  indwelling lines, tubes, and drains  - Monitor endotracheal if appropriate and nasal secretions for changes in amount and color  - [place] appropriate cooling/warming therapies per order  - Administer medications as ordered  - Instruct and encourage patient and family to use good hand hygiene technique  - Identify and instruct in appropriate isolation precautions for identified infection/condition  Outcome: Progressing  Goal: Absence of fever/infection during neutropenic period  Description: INTERVENTIONS:  - Monitor WBC    Outcome: Progressing     Problem: SAFETY ADULT  Goal: Patient will remain free of falls  Description: INTERVENTIONS:  - Educate patient/family on patient safety including physical limitations  - Instruct patient to call for assistance with activity   - Consult OT/PT to assist with strengthening/mobility   - Keep Call bell within reach  - Keep bed low and locked with side rails adjusted as appropriate  - Keep care items and personal belongings within reach  - Initiate and maintain comfort rounds  - Make Fall Risk Sign visible to staff  - Offer Toileting every 2 Hours, in advance of need  - Apply yellow socks and bracelet for high fall risk patients  - Consider moving patient to room near nurses station  Outcome: Progressing  Goal: Maintain or return to baseline ADL function  Description: INTERVENTIONS:  -  Assess patient's ability to carry out ADLs; assess patient's baseline for ADL function and identify physical deficits which impact ability to perform ADLs (bathing, care of mouth/teeth, toileting, grooming, dressing, etc )  - Assess/evaluate cause of self-care deficits   - Assess range of motion  - Assess patient's mobility; develop plan if impaired  - Assess patient's need for assistive devices and provide as appropriate  - Encourage maximum independence but intervene and supervise when necessary  - Involve family in performance of ADLs  - Assess for home care needs following discharge   - Consider OT consult to assist with ADL evaluation and planning for discharge  - Provide patient education as appropriate  Outcome: Progressing  Goal: Maintains/Returns to pre admission functional level  Description: INTERVENTIONS:  - Perform BMAT or MOVE assessment daily    - Set and communicate daily mobility goal to care team and patient/family/caregiver  - Collaborate with rehabilitation services on mobility goals if consulted  - Perform Range of Motion 2 times a day  - Reposition patient every 2 hours    - Dangle patient 2 times a day  - Stand patient 2 times a day  - Ambulate patient 2 times a day  - Out of bed to chair 2 times a day   - Out of bed for meals 2 times a day  - Out of bed for toileting  - Record patient progress and toleration of activity level   Outcome: Progressing     Problem: DISCHARGE PLANNING  Goal: Discharge to home or other facility with appropriate resources  Description: INTERVENTIONS:  - Identify barriers to discharge w/patient and caregiver  - Arrange for needed discharge resources and transportation as appropriate  - Identify discharge learning needs (meds, wound care, etc )  - Arrange for interpretive services to assist at discharge as needed  - Refer to Case Management Department for coordinating discharge planning if the patient needs post-hospital services based on physician/advanced practitioner order or complex needs related to functional status, cognitive ability, or social support system  Outcome: Progressing     Problem: Knowledge Deficit  Goal: Patient/family/caregiver demonstrates understanding of disease process, treatment plan, medications, and discharge instructions  Description: Complete learning assessment and assess knowledge base    Interventions:  - Provide teaching at level of understanding  - Provide teaching via preferred learning methods  Outcome: Progressing     Problem: SKIN/TISSUE INTEGRITY - ADULT  Goal: Incision(s), wounds(s) or drain site(s) healing without S/S of infection  Description: INTERVENTIONS  - Assess and document dressing, incision, wound bed, drain sites and surrounding tissue  - Provide patient and family education  - Perform skin care/dressing changes everyshift  Outcome: Progressing

## 2023-05-06 VITALS
OXYGEN SATURATION: 99 % | HEART RATE: 55 BPM | HEIGHT: 67 IN | TEMPERATURE: 96.9 F | BODY MASS INDEX: 45.99 KG/M2 | RESPIRATION RATE: 20 BRPM | WEIGHT: 293 LBS | SYSTOLIC BLOOD PRESSURE: 116 MMHG | DIASTOLIC BLOOD PRESSURE: 71 MMHG

## 2023-05-06 LAB
ANION GAP SERPL CALCULATED.3IONS-SCNC: 7 MMOL/L (ref 4–13)
BASOPHILS # BLD AUTO: 0.02 THOUSANDS/ÂΜL (ref 0–0.1)
BASOPHILS NFR BLD AUTO: 1 % (ref 0–1)
BUN SERPL-MCNC: 10 MG/DL (ref 5–25)
CALCIUM SERPL-MCNC: 9 MG/DL (ref 8.4–10.2)
CHLORIDE SERPL-SCNC: 96 MMOL/L (ref 96–108)
CO2 SERPL-SCNC: 32 MMOL/L (ref 21–32)
CREAT SERPL-MCNC: 0.6 MG/DL (ref 0.6–1.3)
EOSINOPHIL # BLD AUTO: 0.11 THOUSAND/ÂΜL (ref 0–0.61)
EOSINOPHIL NFR BLD AUTO: 3 % (ref 0–6)
ERYTHROCYTE [DISTWIDTH] IN BLOOD BY AUTOMATED COUNT: 16.9 % (ref 11.6–15.1)
GFR SERPL CREATININE-BSD FRML MDRD: 101 ML/MIN/1.73SQ M
GLUCOSE SERPL-MCNC: 110 MG/DL (ref 65–140)
HCT VFR BLD AUTO: 37.8 % (ref 34.8–46.1)
HGB BLD-MCNC: 11.8 G/DL (ref 11.5–15.4)
IMM GRANULOCYTES # BLD AUTO: 0.01 THOUSAND/UL (ref 0–0.2)
IMM GRANULOCYTES NFR BLD AUTO: 0 % (ref 0–2)
LYMPHOCYTES # BLD AUTO: 1.13 THOUSANDS/ÂΜL (ref 0.6–4.47)
LYMPHOCYTES NFR BLD AUTO: 31 % (ref 14–44)
MCH RBC QN AUTO: 28.7 PG (ref 26.8–34.3)
MCHC RBC AUTO-ENTMCNC: 31.2 G/DL (ref 31.4–37.4)
MCV RBC AUTO: 92 FL (ref 82–98)
MONOCYTES # BLD AUTO: 0.5 THOUSAND/ÂΜL (ref 0.17–1.22)
MONOCYTES NFR BLD AUTO: 14 % (ref 4–12)
NEUTROPHILS # BLD AUTO: 1.88 THOUSANDS/ÂΜL (ref 1.85–7.62)
NEUTS SEG NFR BLD AUTO: 51 % (ref 43–75)
NRBC BLD AUTO-RTO: 0 /100 WBCS
PLATELET # BLD AUTO: 170 THOUSANDS/UL (ref 149–390)
PMV BLD AUTO: 10.3 FL (ref 8.9–12.7)
POTASSIUM SERPL-SCNC: 3.8 MMOL/L (ref 3.5–5.3)
PROCALCITONIN SERPL-MCNC: 0.09 NG/ML
RBC # BLD AUTO: 4.11 MILLION/UL (ref 3.81–5.12)
SODIUM SERPL-SCNC: 135 MMOL/L (ref 135–147)
WBC # BLD AUTO: 3.65 THOUSAND/UL (ref 4.31–10.16)

## 2023-05-06 RX ORDER — AMLODIPINE BESYLATE 5 MG/1
5 TABLET ORAL DAILY
Qty: 30 TABLET | Refills: 2 | Status: SHIPPED | OUTPATIENT
Start: 2023-05-07

## 2023-05-06 RX ORDER — CHLORTHALIDONE 25 MG/1
25 TABLET ORAL DAILY
Qty: 30 TABLET | Refills: 2 | Status: SHIPPED | OUTPATIENT
Start: 2023-05-07

## 2023-05-06 RX ADMIN — BUDESONIDE AND FORMOTEROL FUMARATE DIHYDRATE 2 PUFF: 80; 4.5 AEROSOL RESPIRATORY (INHALATION) at 08:13

## 2023-05-06 RX ADMIN — LORATADINE 10 MG: 10 TABLET ORAL at 08:12

## 2023-05-06 RX ADMIN — NYSTATIN: 100000 POWDER TOPICAL at 08:13

## 2023-05-06 RX ADMIN — AMLODIPINE BESYLATE 5 MG: 5 TABLET ORAL at 08:12

## 2023-05-06 RX ADMIN — GUAIFENESIN 600 MG: 600 TABLET, EXTENDED RELEASE ORAL at 08:12

## 2023-05-06 RX ADMIN — PANTOPRAZOLE SODIUM 40 MG: 40 TABLET, DELAYED RELEASE ORAL at 05:30

## 2023-05-06 RX ADMIN — OXYCODONE HYDROCHLORIDE 10 MG: 5 TABLET ORAL at 08:12

## 2023-05-06 RX ADMIN — ENOXAPARIN SODIUM 40 MG: 40 INJECTION SUBCUTANEOUS at 08:13

## 2023-05-06 RX ADMIN — CHLORTHALIDONE 25 MG: 25 TABLET ORAL at 08:12

## 2023-05-06 NOTE — NURSING NOTE
PT was DC to home D/C instruction was given to PT and PT verbalized understanding of D/C instructions  All personal belonging was given to PT  IV was D/C   [facility]

## 2023-05-06 NOTE — DISCHARGE SUMMARY
[facility]  Discharge- [facility] 1965, 62 y o  female MRN: [id number]  Unit/Bed#: 7T [facility] 710-01 Encounter: [id number]  Primary Care Provider: Zeynep Love MD   Date and time admitted to hospital: 5/2/2023  3:03 PM    * Postoperative complication of skin involving drainage from surgical wound  Assessment & Plan  · Likely secondary to prior open gastric bypass and multiple other abdominal surgeries  · As evidenced by recurrent suture granulomas with persistent drainage and erythema  · Abdominal wound has developed suture granuloma which patient states is painful and draining fluid  · Presents from Dr Rudolph Kim office  · Patient is nonseptic appearing  · Afebrile and without leukocytosis  · Discontinue antibiotics  · Trend fever curve and WBC count  · General Surgery consultation ; appreciate recommendations  · Inpatient consult to wound care nurse ; will likely need wound care nurse at home    Gastroesophageal reflux disease without esophagitis  Assessment & Plan  · Continue home PPI    Primary hypertension  Assessment & Plan  · Improving  · Patient was significantly hypertensive during hospital course  · We will initiate amlodipine 5 mg daily and chlorthalidone 25 mg daily  · Hydralazine 5 mg IV every 6 hours as needed for SBP greater than 160 mmHg  · Monitor blood pressures    Psoriatic arthritis ([facility] [address] )  Assessment & Plan  · Outpatient follow-up with Rheumatology    Pulmonary emphysema ([facility] )  Assessment & Plan  · Not in acute exacerbation  · Continue home albuterol and Symbicort  · Monitor respiratory status    Morbid (severe) obesity due to excess calories ([facility] [address] )  Assessment & Plan  · Present on admission as evidenced by BMI of 49  · Counseled on weight loss and diet/lifestyle modifications    Tobacco dependence  Assessment & Plan  · Nicotine patch while inpatient      Discharging Physician / Practitioner: Eduardo Molina DO  PCP: Zeynep Love MD  Admission Date: Admission Orders (From admission, onward)     Ordered        05/03/23 1516  Inpatient Admission  Once            05/02/23 1721  Place in Observation  Once                      Discharge Date: 05/06/23    Medical Problems     Resolved Problems  Date Reviewed: 5/6/2023   None         Consultations During Hospital Stay: General surgery, wound care    Procedures Performed: Not applicable    Significant Findings / Test Results:     CT abdomen pelvis with contrast    Result Date: 5/2/2023  Impression: Anterior abdominal wall postsurgical changes without an apparent discrete collection on these noncontrast images  Workstation performed: [id number]       Incidental Findings: Not applicable    Test Results Pending at Discharge (will require follow up): Not applicable     Outpatient Tests Requested: Not applicable    Reason for Admission: Abdominal wounds    Hospital Course:     Venkatesh Man is a 62 y o  female with a past medical history significant for morbid obesity status post Vic[place]-en-Y, multiple abdominal surgeries, emphysema, psoriatic arthritis who presents from her general surgeon's office with complaints of abdominal pain  The patient was instructed to present to the ED for further evaluation as she states that serous drainage has been coming out of her abdominal wound over the course of the past few weeks  She is nonseptic appearing  CT abdomen pending  General surgery was consulted for management of open abdominal wound with serous drainage  The patient remained hemodynamically stable and saturating well on room air throughout her hospital course  Wound care saw the patient and case management arranged for wound care at home  General surgery saw the patient and no operative intervention was necessary  No signs of active infection thus antibiotics were discontinued    She was initiated on amlodipine and chlorthalidone in the setting of hypertension and these medications were sent to the patient's "pharmacy  The patient states she will obtain a primary care physician in her new area  Condition at Discharge: stable     Discharge Day Visit / Exam:     Subjective:  Patient seen and examined at bedside  No acute events overnight  Denies chest pain, SOB, diaphoresis, nausea/vomiting/diarrhea, fevers/chills  Vitals: Blood Pressure: (!) 175/99 (05/06/23 0800)  Pulse: 74 (05/06/23 0800)  Temperature: (!) 96 9 °F (36 1 °C) (05/06/23 0800)  Temp Source: Temporal (05/06/23 0800)  Respirations: 20 (05/06/23 0800)  Height: 5' 7\" (170 2 cm) (05/02/23 1853)  Weight - Scale: (!) 145 kg (319 lb 3 6 oz) (05/02/23 1853)  SpO2: 99 % (05/06/23 0800)     Exam:   Physical Exam  Vitals and nursing note reviewed  Constitutional:       General: She is not in acute distress  Appearance: She is well-developed  HENT:      Head: Normocephalic and atraumatic  Eyes:      Conjunctiva/sclera: Conjunctivae normal    Cardiovascular:      Rate and Rhythm: Normal rate and regular rhythm  Heart sounds: No murmur heard  Pulmonary:      Effort: Pulmonary effort is normal  No respiratory distress  Breath sounds: Normal breath sounds  Abdominal:      Palpations: Abdomen is soft  Tenderness: There is no abdominal tenderness  Musculoskeletal:         General: No swelling  Cervical back: Neck supple  Skin:     General: Skin is warm and dry  Capillary Refill: Capillary refill takes less than 2 seconds  Neurological:      Mental Status: She is alert  Psychiatric:         Mood and Affect: Mood normal            Discharge instructions/Information to patient and family:   See after visit summary for information provided to patient and family  Provisions for Follow-Up Care:  See after visit summary for information related to follow-up care and any pertinent home health orders        Disposition:     Home with home health care  Initiate amlodipine and chlorthalidone  Obtain primary care " physician  Follow-up with Dr Alvarez New     Discharge Statement:  I spent 60 minutes discharging the patient  This time was spent on the day of discharge  I had direct contact with the patient on the day of discharge  Greater than 50% of the total time was spent examining patient, answering all patient questions, arranging and discussing plan of care with patient as well as directly providing post-discharge instructions  Additional time then spent on discharge activities  Discharge Medications:  See after visit summary for reconciled discharge medications provided to patient and family        ** Please Note: This note has been constructed using a voice recognition system **

## 2023-05-06 NOTE — PLAN OF CARE
Problem: Potential for Falls  Goal: Patient will remain free of falls  Description: INTERVENTIONS:  - Educate patient/family on patient safety including physical limitations  - Instruct patient to call for assistance with activity   - Consult OT/PT to assist with strengthening/mobility   - Keep Call bell within reach  - Keep bed low and locked with side rails adjusted as appropriate  - Keep care items and personal belongings within reach  - Initiate and maintain comfort rounds  - Make Fall Risk Sign visible to staff  - Offer Toileting every 2 Hours, in advance of need  - Apply yellow socks and bracelet for high fall risk patients  - Consider moving patient to room near nurses station  Outcome: Progressing     Problem: Prexisting or High Potential for Compromised Skin Integrity  Goal: Skin integrity is maintained or improved  Description: INTERVENTIONS:  - Identify patients at risk for skin breakdown  - Assess and monitor skin integrity  - Assess and monitor nutrition and hydration status  - Monitor labs   - Assess for incontinence   - Turn and reposition patient  - Assist with mobility/ambulation  - Relieve pressure over bony prominences  - Avoid friction and shearing  - Provide appropriate hygiene as needed including keeping skin clean and dry  - Evaluate need for skin moisturizer/barrier cream  - Collaborate with interdisciplinary team   - Patient/family teaching  - Consider wound care consult   Outcome: Progressing     Problem: PAIN - ADULT  Goal: Verbalizes/displays adequate comfort level or baseline comfort level  Description: Interventions:  - Encourage patient to monitor pain and request assistance  - Assess pain using appropriate pain scale  - Administer analgesics based on type and severity of pain and evaluate response  - Implement non-pharmacological measures as appropriate and evaluate response  - Consider cultural and social influences on pain and pain management  - Notify physician/advanced practitioner if interventions unsuccessful or patient reports new pain  Outcome: Progressing     Problem: INFECTION - ADULT  Goal: Absence or prevention of progression during hospitalization  Description: INTERVENTIONS:  - Assess and monitor for signs and symptoms of infection  - Monitor lab/diagnostic results  - Monitor all insertion sites, i e  indwelling lines, tubes, and drains  - Monitor endotracheal if appropriate and nasal secretions for changes in amount and color  - [place] appropriate cooling/warming therapies per order  - Administer medications as ordered  - Instruct and encourage patient and family to use good hand hygiene technique  - Identify and instruct in appropriate isolation precautions for identified infection/condition  Outcome: Progressing  Goal: Absence of fever/infection during neutropenic period  Description: INTERVENTIONS:  - Monitor WBC    Outcome: Progressing     Problem: SAFETY ADULT  Goal: Patient will remain free of falls  Description: INTERVENTIONS:  - Educate patient/family on patient safety including physical limitations  - Instruct patient to call for assistance with activity   - Consult OT/PT to assist with strengthening/mobility   - Keep Call bell within reach  - Keep bed low and locked with side rails adjusted as appropriate  - Keep care items and personal belongings within reach  - Initiate and maintain comfort rounds  - Make Fall Risk Sign visible to staff  - Offer Toileting every 2 Hours, in advance of need  - Apply yellow socks and bracelet for high fall risk patients  - Consider moving patient to room near nurses station  Outcome: Progressing  Goal: Maintain or return to baseline ADL function  Description: INTERVENTIONS:  -  Assess patient's ability to carry out ADLs; assess patient's baseline for ADL function and identify physical deficits which impact ability to perform ADLs (bathing, care of mouth/teeth, toileting, grooming, dressing, etc )  - Assess/evaluate cause of self-care deficits   - Assess range of motion  - Assess patient's mobility; develop plan if impaired  - Assess patient's need for assistive devices and provide as appropriate  - Encourage maximum independence but intervene and supervise when necessary  - Involve family in performance of ADLs  - Assess for home care needs following discharge   - Consider OT consult to assist with ADL evaluation and planning for discharge  - Provide patient education as appropriate  Outcome: Progressing  Goal: Maintains/Returns to pre admission functional level  Description: INTERVENTIONS:  - Perform BMAT or MOVE assessment daily    - Set and communicate daily mobility goal to care team and patient/family/caregiver  - Collaborate with rehabilitation services on mobility goals if consulted  - Perform Range of Motion 2 times a day  - Reposition patient every 2 hours    - Dangle patient 2 times a day  - Stand patient 2 times a day  - Ambulate patient 2 times a day  - Out of bed to chair 2 times a day   - Out of bed for meals 2 times a day  - Out of bed for toileting  - Record patient progress and toleration of activity level   Outcome: Progressing     Problem: DISCHARGE PLANNING  Goal: Discharge to home or other facility with appropriate resources  Description: INTERVENTIONS:  - Identify barriers to discharge w/patient and caregiver  - Arrange for needed discharge resources and transportation as appropriate  - Identify discharge learning needs (meds, wound care, etc )  - Arrange for interpretive services to assist at discharge as needed  - Refer to Case Management Department for coordinating discharge planning if the patient needs post-hospital services based on physician/advanced practitioner order or complex needs related to functional status, cognitive ability, or social support system  Outcome: Progressing     Problem: Knowledge Deficit  Goal: Patient/family/caregiver demonstrates understanding of disease process, treatment plan, medications, and discharge instructions  Description: Complete learning assessment and assess knowledge base    Interventions:  - Provide teaching at level of understanding  - Provide teaching via preferred learning methods  Outcome: Progressing     Problem: SKIN/TISSUE INTEGRITY - ADULT  Goal: Incision(s), wounds(s) or drain site(s) healing without S/S of infection  Description: INTERVENTIONS  - Assess and document dressing, incision, wound bed, drain sites and surrounding tissue  - Provide patient and family education  - Perform skin care/dressing changes every shift  Outcome: Progressing

## 2023-05-06 NOTE — PLAN OF CARE
Problem: Prexisting or High Potential for Compromised Skin Integrity  Goal: Skin integrity is maintained or improved  Description: INTERVENTIONS:  - Identify patients at risk for skin breakdown  - Assess and monitor skin integrity  - Assess and monitor nutrition and hydration status  - Monitor labs   - Assess for incontinence   - Turn and reposition patient  - Assist with mobility/ambulation  - Relieve pressure over bony prominences  - Avoid friction and shearing  - Provide appropriate hygiene as needed including keeping skin clean and dry  - Evaluate need for skin moisturizer/barrier cream  - Collaborate with interdisciplinary team   - Patient/family teaching  - Consider wound care consult   Outcome: Progressing     Problem: PAIN - ADULT  Goal: Verbalizes/displays adequate comfort level or baseline comfort level  Description: Interventions:  - Encourage patient to monitor pain and request assistance  - Assess pain using appropriate pain scale  - Administer analgesics based on type and severity of pain and evaluate response  - Implement non-pharmacological measures as appropriate and evaluate response  - Consider cultural and social influences on pain and pain management  - Notify physician/advanced practitioner if interventions unsuccessful or patient reports new pain  Outcome: Progressing     Problem: SKIN/TISSUE INTEGRITY - ADULT  Goal: Incision(s), wounds(s) or drain site(s) healing without S/S of infection  Description: INTERVENTIONS  - Assess and document dressing, incision, wound bed, drain sites and surrounding tissue  - Provide patient and family education  - Perform skin care/dressing changes every   Outcome: Progressing

## 2023-05-09 LAB
BACTERIA BLD CULT: NORMAL
BACTERIA BLD CULT: NORMAL

## 2023-05-16 DIAGNOSIS — S31.109D OPEN WOUND OF ABDOMINAL WALL, SUBSEQUENT ENCOUNTER: Primary | ICD-10-CM

## 2023-05-16 RX ORDER — OXYCODONE HYDROCHLORIDE AND ACETAMINOPHEN 5; 325 MG/1; MG/1
1 TABLET ORAL EVERY 4 HOURS PRN
Qty: 20 TABLET | Refills: 0 | Status: SHIPPED | OUTPATIENT
Start: 2023-05-16 | End: 2023-05-24 | Stop reason: SDUPTHER

## 2023-05-24 ENCOUNTER — OFFICE VISIT (OUTPATIENT)
Dept: SURGERY | Facility: CLINIC | Age: 58
End: 2023-05-24

## 2023-05-24 VITALS
TEMPERATURE: 97 F | OXYGEN SATURATION: 98 % | BODY MASS INDEX: 45.99 KG/M2 | HEIGHT: 67 IN | HEART RATE: 84 BPM | WEIGHT: 293 LBS

## 2023-05-24 DIAGNOSIS — S31.109D OPEN WOUND OF ABDOMINAL WALL, SUBSEQUENT ENCOUNTER: Primary | ICD-10-CM

## 2023-05-24 RX ORDER — OXYCODONE HYDROCHLORIDE AND ACETAMINOPHEN 5; 325 MG/1; MG/1
1 TABLET ORAL EVERY 4 HOURS PRN
Qty: 20 TABLET | Refills: 0 | Status: SHIPPED | OUTPATIENT
Start: 2023-05-24

## 2023-05-24 NOTE — PROGRESS NOTES
Ac Huitron presents today for follow-up visit in regards to 2 abdominal wall wounds  She underwent I&D of a fluid collection and also recurrent suture granuloma  She was seen in the office postoperatively and was admitted to the hospital   At that point she was evaluated by internal medicine  Her blood pressure was controlled  We had wound care see the wounds and they recommended dressing changes etc   She was discharged with home care and she is here today for follow-up visit  Bekah Silva she says she feels fine  She can pack the wound herself  Home care comes twice a week  Physical exam: Middle-aged white female obese awake alert no distress    Abdomen 2 wounds are present  1 subxiphoid and 1 mid abdomen  Both of these wounds look quite well  The subxiphoid wound is almost healed  The mid abdomen wound is also quite narrow  Both wounds are cleaned with Q-tips and peroxide  The aforementioned wound care products were used to redress the wounds  She tolerated well  Impression: Persistent wounds that appear to be healing with wound care's recommendations  We are very appreciative of this  Plan: Return in 6 weeks

## 2023-06-06 DIAGNOSIS — S31.109D OPEN WOUND OF ABDOMINAL WALL, SUBSEQUENT ENCOUNTER: Primary | ICD-10-CM

## 2023-06-06 RX ORDER — OXYCODONE HYDROCHLORIDE AND ACETAMINOPHEN 5; 325 MG/1; MG/1
1 TABLET ORAL EVERY 6 HOURS PRN
Qty: 20 TABLET | Refills: 0 | Status: SHIPPED | OUTPATIENT
Start: 2023-06-06

## 2023-06-13 ENCOUNTER — TELEPHONE (OUTPATIENT)
Dept: SURGERY | Facility: CLINIC | Age: 58
End: 2023-06-13

## 2023-06-13 NOTE — TELEPHONE ENCOUNTER
I spoke with  Rheumatology and told them Dr Siobhan Loredo confirmed it is ok for patient to restart Taltz  and patient is aware

## 2023-07-11 ENCOUNTER — OFFICE VISIT (OUTPATIENT)
Dept: SURGERY | Facility: CLINIC | Age: 58
End: 2023-07-11
Payer: MEDICARE

## 2023-07-11 VITALS
TEMPERATURE: 97 F | BODY MASS INDEX: 44.57 KG/M2 | WEIGHT: 284 LBS | HEART RATE: 82 BPM | OXYGEN SATURATION: 98 % | HEIGHT: 67 IN

## 2023-07-11 DIAGNOSIS — S31.109D OPEN WOUND OF ABDOMINAL WALL, SUBSEQUENT ENCOUNTER: Primary | ICD-10-CM

## 2023-07-11 PROCEDURE — 99212 OFFICE O/P EST SF 10 MIN: CPT | Performed by: SPECIALIST

## 2023-07-11 RX ORDER — FLUOXETINE HYDROCHLORIDE 20 MG/1
CAPSULE ORAL
COMMUNITY
Start: 2023-06-19

## 2023-07-11 RX ORDER — ERGOCALCIFEROL 1.25 MG/1
CAPSULE ORAL
COMMUNITY
Start: 2023-06-23

## 2023-07-11 RX ORDER — TRIAMCINOLONE ACETONIDE 1 MG/G
CREAM TOPICAL
COMMUNITY
Start: 2023-06-07

## 2023-07-11 RX ORDER — DOXYCYCLINE HYCLATE 100 MG/1
CAPSULE ORAL
COMMUNITY
Start: 2023-06-30

## 2023-07-11 NOTE — PROGRESS NOTES
It is always great to see Mehreen Button. She is like the energizer mariony. She recently moved to [facility] but still comes here to see me. Today in the office she does look great. She has lost 40 pounds. She changed her eating habits. I.e. no more booze and no more processed food. She is eating healthy. Like post bariatric diet. Her abdominal wounds are almost 100% healed. The lower 1 is healed. The upper 1 is minimally open with a pinhole. Physical exam: Middle-aged obese white female awake alert no distress. Abdomen: Upper midline incision is a pinhole no erythema no drainage no cellulitis. Wound care products that she brought with her are used to redress it. Impression: Wound finally healing. Hooray!!!    Plan: If she was still in town we would make a follow-up appointment with her. Since she is so far away and has to spend the night in a hotel in order to see us we will leave her on her own recognizance. The wound looks like it is healing. If she has any problems whatsoever she may call us and we would see her post haste    She is like family. We love her.

## 2024-04-02 ENCOUNTER — OFFICE VISIT (OUTPATIENT)
Dept: SURGERY | Facility: CLINIC | Age: 59
End: 2024-04-02
Payer: MEDICARE

## 2024-04-02 VITALS
SYSTOLIC BLOOD PRESSURE: 118 MMHG | DIASTOLIC BLOOD PRESSURE: 74 MMHG | HEART RATE: 86 BPM | OXYGEN SATURATION: 100 % | HEIGHT: 67 IN | TEMPERATURE: 97.9 F | WEIGHT: 273 LBS | BODY MASS INDEX: 42.85 KG/M2

## 2024-04-02 DIAGNOSIS — R10.9 RIGHT FLANK PAIN: Primary | ICD-10-CM

## 2024-04-02 DIAGNOSIS — K43.9 ABDOMINAL WALL HERNIA: ICD-10-CM

## 2024-04-02 DIAGNOSIS — K43.9 VENTRAL HERNIA WITHOUT OBSTRUCTION OR GANGRENE: Primary | ICD-10-CM

## 2024-04-02 DIAGNOSIS — K43.9 ABDOMINAL WALL HERNIA: Primary | ICD-10-CM

## 2024-04-02 PROCEDURE — 99214 OFFICE O/P EST MOD 30 MIN: CPT | Performed by: SPECIALIST

## 2024-04-02 RX ORDER — DEXTROAMPHETAMINE SACCHARATE, AMPHETAMINE ASPARTATE, DEXTROAMPHETAMINE SULFATE AND AMPHETAMINE SULFATE 2.5; 2.5; 2.5; 2.5 MG/1; MG/1; MG/1; MG/1
TABLET ORAL
COMMUNITY
Start: 2024-03-22

## 2024-04-02 RX ORDER — DEXTROAMPHETAMINE SACCHARATE, AMPHETAMINE ASPARTATE, DEXTROAMPHETAMINE SULFATE AND AMPHETAMINE SULFATE 5; 5; 5; 5 MG/1; MG/1; MG/1; MG/1
TABLET ORAL
COMMUNITY
Start: 2024-03-22

## 2024-04-02 NOTE — PROGRESS NOTES
"Chief Complaint: Recurrent incisional hernia      History of Present Illness: Anita is a 58-year-old white female who is an old friend of ours who we have been taking care of for 20 years or so.  I will not go into her history at this time but she resents the office today with a \"hernia between her boobs \".  She has developed a bulge in the area of her cleavage.  She has had multiple abdominal surgeries and also repair of incisional hernias with mesh.  She has a chronic draining wound at the epigastric area that apparently healed with the help of wound care.  Unfortunately it started draining recently and she developed a bulge in this area.  The bulge appears to be anterior to her sternum extending between her breasts.  She says she hears a gurgling over the area she developed a bulge and she can reduce this in the supine position.  She admits to intermittent pain in the area but says that it is happening more often and is becoming more painful.      Past Medical History:   Past Medical History:   Diagnosis Date    Anemia     hx of receiving iron infusions    Anxiety     Arthritis     Asthma     Chronic pain disorder     left humerus/right shoulder/psoriatic arhtritis chronic tendonitis    Clotting disorder (HCC)     pt unsure of this,,,\"did have to take coumadin for a blood clot/pulm emboli after a fracture surgery around 1995 or so\"    Exercise involving cycling     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    GERD (gastroesophageal reflux disease)     History of bariatric surgery 2006    Roun-Y ; weight loss of 400lbs and has gained 30-50 lbs back or so    History of pneumonia     History of transfusion 2000    Limb alert care status     Left arm No BP/Labs or /IV's    Muscle weakness     left Arm    Psoriasis     Psoriatic arthritis (HCC) 1995    Pulmonary emboli (HCC) 1995    post humeral frac    Radial nerve palsy, left     arm    Suture granuloma     Vitamin D deficiency     Wears glasses     reading "         Past Surgical History:    Past Surgical History:   Procedure Laterality Date    ABDOMINAL SURGERY      CHOLECYSTECTOMY      COLON SURGERY      COLONOSCOPY      ESSURE TUBAL LIGATION      FACIAL/NECK BIOPSY N/A 7/17/2020    Procedure: NECK EXPLORATION;  Surgeon: Jacobo Oliveros DO;  Location: [facility];  Service: ENT    FOREIGN BODY REMOVAL N/A 6/4/2021    Procedure: Removal of suture granuloma abdomen;  Surgeon: Gato Humphrey MD;  Location:  [facility];  Service: General    FRACTURE SURGERY  1995    humeral frac repair/rods/metal plates left arm    GASTRIC BYPASS OPEN  2006    HERNIA REPAIR      X3 with mesh    INCISION AND DRAINAGE ANTERIOR NECK Right 6/16/2020    Procedure: INCISION AND DRAINAGE  (I&D) NECK;  Surgeon: Jacobo Oliveros DO;  Location: [facility];  Service: ENT    IVC FILTER INSERTION  1995    radha filter    PANNICULECTOMY      LA EXCISION SUBMANDIBULAR SUBMAXILLARY GLAND Right 6/12/2020    Procedure: EXCISION SUBMANDIBULAR GLAND;  Surgeon: Jacobo Oliveros DO;  Location: [facility];  Service: ENT    LA EXPL PENETRATING WOUND SPX ABDOMEN/FLANK/BACK N/A 1/31/2020    Procedure: exploation of abdominal wound removal suture granulomas and foreign body;  Surgeon: Gato Humphrey MD;  Location:  [facility];  Service: General    LA EXPL PENETRATING WOUND SPX ABDOMEN/FLANK/BACK N/A 3/2/2020    Procedure: EXPLORE WOUND OF BACK LEFT OPEN WITH PACKING;  Surgeon: Gato Humphrey MD;  Location:  [facility];  Service: General    LA EXPL PENETRATING WOUND SPX ABDOMEN/FLANK/BACK N/A 11/18/2021    Procedure: WOUND EXPLORATION REMOVAL OF SUTURE GRANULOMA;  Surgeon: Gato Humphrey MD;  Location:  [facility];  Service: General    LA EXPL PENETRATING WOUND SPX ABDOMEN/FLANK/BACK N/A 10/6/2022    Procedure: WOUND EXPLORATION AND REMOVAL SUTURE GRANULOMA, REMOVAL OF FOREIGN BODY OF ABDOMEN;  Surgeon: Gato Humphrey MD;  Location:  [facility];  Service: General    LA INCISION & DRAINAGE ABSCESS COMPLICATED/MULTIPLE N/A  "4/20/2023    Procedure: INCISION AND DRAINAGE ABDOMINAL WALL FLUID COLLECTION AND WOUND EXPLORATION WITH DEBRIDEMENT TO THE FASCIA (SHARP AND BLUNT) .EXCISION OF FOREIGN BODY;  Surgeon: Gato Humphrey MD;  Location:  [facility] OR;  Service: General    SMALL INTESTINE SURGERY      VENA CAVA FILTER PLACEMENT           Allergies:    Allergies   Allergen Reactions    Morphine Hives and Itching    Penicillins Other (See Comments) and Anaphylaxis     \"PARALYZES HER\"  Was paralized    \"PARALYZES HER\"  Was paralized  \"PARALYZES HER\"  Was paralized  \"PARALYZES HER\"  Was paralyzed  Other Reaction(s): Other (See Comments)    \"PARALYZES HER\" Was paralized \"PARALYZES HER\" Was paralized    \"PARALYZES HER\" Was paralized  \"PARALYZES HER\" Was paralized \"PARALYZES HER\" Was paralized \"PARALYZES HER\" Was paralyzed    \"PARALYZES HER\" Was paralyzed  Other Reaction(s): \"paralyzes\" her      Prednisone Abdominal Pain, Other (See Comments) and GI Intolerance     Worsening psoriasis;    Has taken it if needed/as prescribed    Other reaction(s): Indigestion/GI Upset    Worsening psoriasis;  Has taken it if needed/as prescribed    Abdominal pain, worsening psoriasis    Other reaction(s): Abdominal Pain, Worsening psoriasis   Has taken it if needed/as prescribed.    Cefazolin Dermatitis         Medications:    Current Outpatient Medications:     albuterol (PROVENTIL HFA,VENTOLIN HFA) 90 mcg/act inhaler, Inhale 2 puffs every 6 (six) hours as needed for wheezing or shortness of breath, Disp: 6.7 g, Rfl: 0    amLODIPine (NORVASC) 5 mg tablet, Take 1 tablet (5 mg total) by mouth daily Do not start before May 7, 2023., Disp: 30 tablet, Rfl: 2    amphetamine-dextroamphetamine (ADDERALL) 10 mg tablet, TAKE ONE TABLET A DAY IN THE AFTERNOON WITH LUNCH., Disp: , Rfl:     amphetamine-dextroamphetamine (ADDERALL) 20 mg tablet, TAKE 1 TABLET BY MOUTH EVERY MORNING. MAX DAILY AMOUNT: 20 MG., Disp: , Rfl:     budesonide-formoterol (SYMBICORT) 80-4.5 MCG/ACT " inhaler, Inhale 2 puffs 2 (two) times a day, Disp: 10.2 g, Rfl: 0    chlorthalidone 25 mg tablet, Take 1 tablet (25 mg total) by mouth daily Do not start before May 7, 2023., Disp: 30 tablet, Rfl: 2    FLUoxetine (PROzac) 20 mg capsule, 40 mg, Disp: , Rfl:     loratadine (CLARITIN) 10 mg tablet, Take 10 mg by mouth 2 (two) times a day, Disp: , Rfl:     doxycycline hyclate (VIBRAMYCIN) 100 mg capsule, TAKE 1 CAPSULE BY MOUTH 2 TIMES A DAY FOR 10 DAYS, Disp: , Rfl:     ergocalciferol (VITAMIN D2) 50,000 units, TAKE 1 CAPSULE BY MOUTH ONE TIME PER WEEK, Disp: , Rfl:     hydrOXYzine HCL (ATARAX) 25 mg tablet, Take 1 tablet (25 mg total) by mouth 2 (two) times a day (Patient not taking: Reported on 4/2/2024), Disp: 30 tablet, Rfl: 2    Ixekizumab (Taltz) 80 MG/ML SOAJ, Inject 80 mg under the skin every 14 (fourteen) days (Patient not taking: Reported on 4/2/2024), Disp: , Rfl:     methotrexate 2.5 mg tablet, , Disp: , Rfl:     omeprazole (PriLOSEC) 20 mg delayed release capsule, Take 20 mg by mouth every morning , Disp: , Rfl:     oxyCODONE-acetaminophen (Percocet) 5-325 mg per tablet, Take 1 tablet by mouth every 4 (four) hours as needed for moderate pain Max Daily Amount: 6 tablets (Patient not taking: Reported on 5/24/2023), Disp: 20 tablet, Rfl: 0    oxyCODONE-acetaminophen (Percocet) 5-325 mg per tablet, Take 1 tablet by mouth every 4 (four) hours as needed for severe pain Max Daily Amount: 6 tablets (Patient not taking: Reported on 5/24/2023), Disp: 30 tablet, Rfl: 0    oxyCODONE-acetaminophen (Percocet) 5-325 mg per tablet, Take 1 tablet by mouth every 4 (four) hours as needed for moderate pain Max Daily Amount: 6 tablets (Patient not taking: Reported on 7/11/2023), Disp: 20 tablet, Rfl: 0    oxyCODONE-acetaminophen (Percocet) 5-325 mg per tablet, Take 1 tablet by mouth every 6 (six) hours as needed for moderate pain Max Daily Amount: 4 tablets (Patient not taking: Reported on 7/11/2023), Disp: 20 tablet, Rfl: 0     triamcinolone (KENALOG) 0.1 % cream, PLEASE SEE ATTACHED FOR DETAILED DIRECTIONS (Patient not taking: Reported on 4/2/2024), Disp: , Rfl:       Social History:  Social History     Social History     Substance and Sexual Activity   Alcohol Use Not Currently    Comment: occasionally     Social History     Substance and Sexual Activity   Drug Use Never     Social History     Tobacco Use   Smoking Status Some Days    Current packs/day: 0.00    Types: Cigarettes    Last attempt to quit: 2/22/2021    Years since quitting: 3.1   Smokeless Tobacco Never         Family History:    Family History   Problem Relation Age of Onset    Heart block Mother     Diabetes Mother     No Known Problems Father          Review of Systems:    Weight loss recently planned.  Negative otherwise    Vitals:  Vitals:    04/02/24 1314   BP: 118/74   Pulse: 86   Temp: 97.9 °F (36.6 °C)   SpO2: 100%       Physical Exam:  Middle-aged obese white female 5 foot 7 inches 273 pounds.    Vital signs as above    Abdomen obese multiple scars are noted.  In the epigastric area there is a small punctate sinus draining some serosanguineous fluid.  There is a bulge above this extending between her breasts.  This is reducible and the defect appears to be in the epigastric area in the general vicinity of the draining sinus.      Lab Results: I have personally reviewed pertinent reports. See below.  Imaging: I have personally reviewed pertinent reports.   EKG, Pathology, and Other Studies: I have personally reviewed pertinent reports.     No visits with results within 1 Day(s) from this visit.   Latest known visit with results is:   Admission on 05/02/2023, Discharged on 05/06/2023   Component Date Value    WBC 05/02/2023 4.75     RBC 05/02/2023 4.47     Hemoglobin 05/02/2023 13.1     Hematocrit 05/02/2023 40.4     MCV 05/02/2023 90     MCH 05/02/2023 29.3     MCHC 05/02/2023 32.4     RDW 05/02/2023 16.5 (H)     MPV 05/02/2023 10.1     Platelets 05/02/2023 185      nRBC 05/02/2023 0     Neutrophils Relative 05/02/2023 62     Immature Grans % 05/02/2023 0     Lymphocytes Relative 05/02/2023 28     Monocytes Relative 05/02/2023 8     Eosinophils Relative 05/02/2023 1     Basophils Relative 05/02/2023 1     Neutrophils Absolute 05/02/2023 2.90     Absolute Immature Grans 05/02/2023 0.01     Absolute Lymphocytes 05/02/2023 1.35     Absolute Monocytes 05/02/2023 0.40     Eosinophils Absolute 05/02/2023 0.06     Basophils Absolute 05/02/2023 0.03     Sodium 05/02/2023 137     Potassium 05/02/2023 4.2     Chloride 05/02/2023 97     CO2 05/02/2023 31     ANION GAP 05/02/2023 9     BUN 05/02/2023 12     Creatinine 05/02/2023 0.56 (L)     Glucose 05/02/2023 98     Calcium 05/02/2023 8.7     AST 05/02/2023 33     ALT 05/02/2023 13     Alkaline Phosphatase 05/02/2023 97     Total Protein 05/02/2023 7.2     Albumin 05/02/2023 3.6     Total Bilirubin 05/02/2023 0.77     eGFR 05/02/2023 103     Blood Culture 05/02/2023 No Growth After 5 Days.     Blood Culture 05/02/2023 No Growth After 5 Days.     Wound Culture 05/02/2023 1+ Growth of Staphylococcus aureus (A)     Gram Stain Result 05/02/2023 1+ Polys     Gram Stain Result 05/02/2023 No bacteria seen     Sodium 05/03/2023 136     Potassium 05/03/2023 3.8     Chloride 05/03/2023 99     CO2 05/03/2023 26     ANION GAP 05/03/2023 11     BUN 05/03/2023 11     Creatinine 05/03/2023 0.55 (L)     Glucose 05/03/2023 79     Calcium 05/03/2023 8.3 (L)     eGFR 05/03/2023 104     WBC 05/03/2023 4.05 (L)     RBC 05/03/2023 4.01     Hemoglobin 05/03/2023 11.6     Hematocrit 05/03/2023 37.5     MCV 05/03/2023 94     MCH 05/03/2023 28.9     MCHC 05/03/2023 30.9 (L)     RDW 05/03/2023 16.6 (H)     MPV 05/03/2023 11.0     Platelets 05/03/2023 168     nRBC 05/03/2023 0     Neutrophils Relative 05/03/2023 53     Immature Grans % 05/03/2023 0     Lymphocytes Relative 05/03/2023 32     Monocytes Relative 05/03/2023 12     Eosinophils Relative 05/03/2023 2      Basophils Relative 05/03/2023 1     Neutrophils Absolute 05/03/2023 2.16     Absolute Immature Grans 05/03/2023 0.01     Absolute Lymphocytes 05/03/2023 1.28     Absolute Monocytes 05/03/2023 0.48     Eosinophils Absolute 05/03/2023 0.09     Basophils Absolute 05/03/2023 0.03     Magnesium 05/03/2023 2.2     Phosphorus 05/03/2023 4.0     WBC 05/04/2023 2.94 (L)     RBC 05/04/2023 4.17     Hemoglobin 05/04/2023 12.4     Hematocrit 05/04/2023 39.2     MCV 05/04/2023 94     MCH 05/04/2023 29.7     MCHC 05/04/2023 31.6     RDW 05/04/2023 17.0 (H)     MPV 05/04/2023 10.9     Platelets 05/04/2023 167     nRBC 05/04/2023 0     Neutrophils Relative 05/04/2023 46     Immature Grans % 05/04/2023 0     Lymphocytes Relative 05/04/2023 36     Monocytes Relative 05/04/2023 14 (H)     Eosinophils Relative 05/04/2023 3     Basophils Relative 05/04/2023 1     Neutrophils Absolute 05/04/2023 1.37 (L)     Absolute Immature Grans 05/04/2023 0.00     Absolute Lymphocytes 05/04/2023 1.06     Absolute Monocytes 05/04/2023 0.40     Eosinophils Absolute 05/04/2023 0.08     Basophils Absolute 05/04/2023 0.03     Sodium 05/04/2023 136     Potassium 05/04/2023 3.9     Chloride 05/04/2023 97     CO2 05/04/2023 32     ANION GAP 05/04/2023 7     BUN 05/04/2023 9     Creatinine 05/04/2023 0.57 (L)     Glucose 05/04/2023 93     Calcium 05/04/2023 8.8     eGFR 05/04/2023 103     Magnesium 05/04/2023 2.1     Phosphorus 05/04/2023 4.3     WBC 05/05/2023 3.58 (L)     RBC 05/05/2023 4.02     Hemoglobin 05/05/2023 11.7     Hematocrit 05/05/2023 38.8     MCV 05/05/2023 97     MCH 05/05/2023 29.1     MCHC 05/05/2023 30.2 (L)     RDW 05/05/2023 17.2 (H)     MPV 05/05/2023 11.3     Platelets 05/05/2023 154     nRBC 05/05/2023 0     Neutrophils Relative 05/05/2023 51     Immature Grans % 05/05/2023 0     Lymphocytes Relative 05/05/2023 30     Monocytes Relative 05/05/2023 14 (H)     Eosinophils Relative 05/05/2023 4     Basophils Relative 05/05/2023 1      Neutrophils Absolute 05/05/2023 1.85     Absolute Immature Grans 05/05/2023 0.00     Absolute Lymphocytes 05/05/2023 1.08     Absolute Monocytes 05/05/2023 0.50     Eosinophils Absolute 05/05/2023 0.13     Basophils Absolute 05/05/2023 0.02     Sodium 05/05/2023 135     Potassium 05/05/2023 4.6     Chloride 05/05/2023 96     CO2 05/05/2023 30     ANION GAP 05/05/2023 9     BUN 05/05/2023 11     Creatinine 05/05/2023 0.51 (L)     Glucose 05/05/2023 90     Calcium 05/05/2023 8.7     eGFR 05/05/2023 107     Magnesium 05/05/2023 2.3     Phosphorus 05/05/2023 4.3     WBC 05/06/2023 3.65 (L)     RBC 05/06/2023 4.11     Hemoglobin 05/06/2023 11.8     Hematocrit 05/06/2023 37.8     MCV 05/06/2023 92     MCH 05/06/2023 28.7     MCHC 05/06/2023 31.2 (L)     RDW 05/06/2023 16.9 (H)     MPV 05/06/2023 10.3     Platelets 05/06/2023 170     nRBC 05/06/2023 0     Neutrophils Relative 05/06/2023 51     Immature Grans % 05/06/2023 0     Lymphocytes Relative 05/06/2023 31     Monocytes Relative 05/06/2023 14 (H)     Eosinophils Relative 05/06/2023 3     Basophils Relative 05/06/2023 1     Neutrophils Absolute 05/06/2023 1.88     Absolute Immature Grans 05/06/2023 0.01     Absolute Lymphocytes 05/06/2023 1.13     Absolute Monocytes 05/06/2023 0.50     Eosinophils Absolute 05/06/2023 0.11     Basophils Absolute 05/06/2023 0.02     Sodium 05/06/2023 135     Potassium 05/06/2023 3.8     Chloride 05/06/2023 96     CO2 05/06/2023 32     ANION GAP 05/06/2023 7     BUN 05/06/2023 10     Creatinine 05/06/2023 0.60     Glucose 05/06/2023 110     Calcium 05/06/2023 9.0     eGFR 05/06/2023 101     Procalcitonin 05/06/2023 0.09          Impression:  Incisional hernia, recurrent, in an area of a draining sinus.  This precludes mesh placement.  At least at this point in time.    Plan:  CT scan abdomen to help with the anatomy in the area and help plan and approach to herniorrhaphy.

## 2024-12-10 DIAGNOSIS — K43.9 ABDOMINAL WALL HERNIA: Primary | ICD-10-CM

## 2024-12-10 DIAGNOSIS — R10.9 ABDOMINAL PAIN: ICD-10-CM

## 2024-12-11 ENCOUNTER — TELEPHONE (OUTPATIENT)
Age: 59
End: 2024-12-11

## 2024-12-11 DIAGNOSIS — K43.9 ABDOMINAL WALL HERNIA: Primary | ICD-10-CM

## (undated) DEVICE — NEEDLE BLUNT 18 G X 1 1/2IN

## (undated) DEVICE — SPONGE LAP 18 X 18 IN

## (undated) DEVICE — LIGHT GLOVE GREEN

## (undated) DEVICE — SPONGE STICK WITH PVP-I: Brand: KENDALL

## (undated) DEVICE — NEEDLE 18 G X 1 1/2

## (undated) DEVICE — GLOVE SRG BIOGEL ORTHOPEDIC 7

## (undated) DEVICE — BASIC PACK: Brand: CONVERTORS

## (undated) DEVICE — SCD SEQUENTIAL COMPRESSION COMFORT SLEEVE LARGE KNEE LENGTH: Brand: KENDALL SCD

## (undated) DEVICE — GAUZE SPONGES,16 PLY: Brand: CURITY

## (undated) DEVICE — SCD SEQUENTIAL COMPRESSION COMFORT SLEEVE MEDIUM KNEE LENGTH: Brand: KENDALL SCD

## (undated) DEVICE — PENCIL ELECTROSURG E-Z CLEAN -0035H

## (undated) DEVICE — SYRINGE 10ML LL CONTROL TOP

## (undated) DEVICE — DISPOSABLE OR TOWEL: Brand: CARDINAL HEALTH

## (undated) DEVICE — SYRINGE 30ML LL

## (undated) DEVICE — STRL PENROSE DRAIN 18" X 1/4": Brand: CARDINAL HEALTH

## (undated) DEVICE — ASTOUND STANDARD SURGICAL GOWN, XXL: Brand: CONVERTORS

## (undated) DEVICE — MINOR PROCEDURE DRAPE: Brand: CONVERTORS

## (undated) DEVICE — SYRINGE 10ML LL

## (undated) DEVICE — STERILE POLYISOPRENE POWDER-FREE SURGICAL GLOVES: Brand: PROTEXIS

## (undated) DEVICE — INTENDED FOR TISSUE SEPARATION, AND OTHER PROCEDURES THAT REQUIRE A SHARP SURGICAL BLADE TO PUNCTURE OR CUT.: Brand: BARD-PARKER SAFETY BLADES SIZE 15, STERILE

## (undated) DEVICE — HARMONIC FOCUS SHEARS 9CM LENGTH + ADAPTIVE TISSUE TECHNOLOGY FOR USE WITH BLUE HAND PIECE ONLY: Brand: HARMONIC FOCUS

## (undated) DEVICE — DRAPE EQUIPMENT RF WAND

## (undated) DEVICE — SPONGE 4 X 4 XRAY 16 PLY STRL LF RFD

## (undated) DEVICE — SUT ETHILON 2-0 FS 18 IN 664H

## (undated) DEVICE — VIOLET BRAIDED (POLYGLACTIN 910), SYNTHETIC ABSORBABLE SUTURE: Brand: COATED VICRYL

## (undated) DEVICE — BULB SYRINGE,IRRIGATION WITH PROTECTIVE CAP: Brand: DOVER

## (undated) DEVICE — SPONGE LAP 18 X 4 IN STRL RFD

## (undated) DEVICE — NEEDLE 25G X 1 1/2

## (undated) DEVICE — PLUMEPEN PRO 10FT

## (undated) DEVICE — 3M™ STERI-STRIP™ REINFORCED ADHESIVE SKIN CLOSURES, R1547, 1/2 IN X 4 IN (12 MM X 100 MM), 6 STRIPS/ENVELOPE: Brand: 3M™ STERI-STRIP™

## (undated) DEVICE — SWABSTCK, BENZOIN TINCTURE, 1/PK, STRL: Brand: APLICARE

## (undated) DEVICE — GLOVE INDICATOR PI UNDERGLOVE SZ 7 BLUE

## (undated) DEVICE — SUT SILK 2-0 30 IN A305H

## (undated) DEVICE — GLOVE SRG BIOGEL ECLIPSE 7.5

## (undated) DEVICE — SPONGE CHERRY 1/2IN

## (undated) DEVICE — STERILE POLYISOPRENE POWDER-FREE SURGICAL GLOVES WITH EMOLLIENT COATING: Brand: PROTEXIS

## (undated) DEVICE — SUPER SPONGES,MEDIUM: Brand: KERLIX

## (undated) DEVICE — NDL CNTR 20CT FM MAG: Brand: MEDLINE INDUSTRIES, INC.

## (undated) DEVICE — SMARTGOWN SURGICAL GOWN, XL, LONG: Brand: CONVERTORS

## (undated) DEVICE — SUT VICRYL 3-0 SH 27 IN J416H

## (undated) DEVICE — SKIN MARKER DUAL TIP WITH RULER CAP, FLEXIBLE RULER AND LABELS: Brand: DEVON

## (undated) DEVICE — GLOVE INDICATOR PI UNDERGLOVE SZ 8.5 BLUE

## (undated) DEVICE — THE SIMPULSE SOLO SYSTEM WITH ULTREX RETRACTABLE SPLASH SHIELD TIP: Brand: SIMPULSE SOLO

## (undated) DEVICE — TELFA NON-ADHERENT ABSORBENT DRESSING: Brand: TELFA

## (undated) DEVICE — BETHLEHEM UNIVERSAL MINOR GEN: Brand: CARDINAL HEALTH

## (undated) DEVICE — ASTOUND STANDARD SURGICAL GOWN, XL: Brand: CONVERTORS

## (undated) DEVICE — PROXIMATE SKIN STAPLERS (35 WIDE) CONTAINS 35 STAINLESS STEEL STAPLES (FIXED HEAD): Brand: PROXIMATE

## (undated) DEVICE — SUT ETHILON 3-0 PS-1 18 IN 1663G

## (undated) DEVICE — OCCLUSIVE GAUZE STRIP,3% BISMUTH TRIBROMOPHENATE IN PETROLATUM BLEND: Brand: XEROFORM

## (undated) DEVICE — ELECTRODE BLADE MOD E-Z CLEAN  2.75IN 7CM -0012AM

## (undated) DEVICE — TUBING SUCTION 5MM X 12 FT

## (undated) DEVICE — SUT VICRYL 4-0 SH 27 IN J415H

## (undated) DEVICE — STANDARD SURGICAL GOWN, L: Brand: CONVERTORS

## (undated) DEVICE — ANTIBACTERIAL UNDYED BRAIDED (POLYGLACTIN 910), SYNTHETIC ABSORBABLE SUTURE: Brand: COATED VICRYL

## (undated) DEVICE — 10FR FRAZIER SUCTION HANDLE: Brand: CARDINAL HEALTH

## (undated) DEVICE — ADHESIVE SKIN HIGH VISCOSITY EXOFIN 1ML

## (undated) DEVICE — SUT ETHILON 4-0 PS-2 18 IN 1667H

## (undated) DEVICE — 2000CC GUARDIAN II: Brand: GUARDIAN

## (undated) DEVICE — SUT MONOCRYL 4-0 PS-2 27 IN Y426H

## (undated) DEVICE — 1820 FOAM BLOCK NEEDLE COUNTER: Brand: DEVON

## (undated) DEVICE — TIBURON SPLIT SHEET: Brand: CONVERTORS

## (undated) DEVICE — SUT MONOCRYL 4-0 SH 27 IN Y415H

## (undated) DEVICE — SUT ETHILON 3-0 FS-1 18 IN 663G

## (undated) DEVICE — ABDOMINAL PAD: Brand: DERMACEA

## (undated) DEVICE — SUT ETHILON 3-0 PS-1 18 IN 1663H

## (undated) DEVICE — GAUZE SPONGES,USP TYPE VII GAUZE, 12 PLY: Brand: CURITY

## (undated) DEVICE — SURGICEL 2 X 14

## (undated) DEVICE — KENDALL SCD EXPRESS SLEEVES, KNEE LENGTH, X-LARGE: Brand: KENDALL SCD

## (undated) DEVICE — GLOVE SRG BIOGEL ECLIPSE 7

## (undated) DEVICE — STERILE BETHLEHEM T AND A PACK: Brand: CARDINAL HEALTH

## (undated) DEVICE — GLOVE INDICATOR PI UNDERGLOVE SZ 7.5 BLUE